# Patient Record
Sex: MALE | Race: WHITE | NOT HISPANIC OR LATINO | URBAN - METROPOLITAN AREA
[De-identification: names, ages, dates, MRNs, and addresses within clinical notes are randomized per-mention and may not be internally consistent; named-entity substitution may affect disease eponyms.]

---

## 2017-08-06 ENCOUNTER — MYC REFILL (OUTPATIENT)
Dept: FAMILY MEDICINE | Facility: CLINIC | Age: 62
End: 2017-08-06

## 2017-08-06 DIAGNOSIS — J45.40 MODERATE PERSISTENT ASTHMA WITHOUT COMPLICATION: ICD-10-CM

## 2017-08-07 NOTE — TELEPHONE ENCOUNTER
Routing refill request to provider for review/approval because:  Patient needs to be seen because it has been more than 1 year since last office visit.  ACT > 6 months old  Kaye Schaefer RN - Triage  [facility]

## 2017-08-07 NOTE — TELEPHONE ENCOUNTER
Flovent diskus       Last Written Prescription Date: 3/13/17  Last Fill Quantity: 1, # refills: 0    Last Office Visit with G, P or [facility] prescribing provider:  3/9/16   Future Office Visit:       Date of Last Asthma Action Plan Letter:   Asthma Action Plan Q1 Year    Topic Date Due     Asthma Action Plan - yearly  03/09/2017      Asthma Control Test:   ACT Total Scores 3/9/2016   ACT TOTAL SCORE -   ASTHMA ER VISITS -   ASTHMA HOSPITALIZATIONS -   ACT TOTAL SCORE (Goal Greater than or Equal to 20) 25   In the past 12 months, how many times did you visit the emergency room for your asthma without being admitted to the hospital? 0   In the past 12 months, how many times were you hospitalized overnight because of your asthma? 0       Date of Last Spirometry Test:   No results found for this or any previous visit.          Elsa Mcgee CMA

## 2017-08-07 NOTE — TELEPHONE ENCOUNTER
Message from [employer]:  Original authorizing provider: MD Rommel Valdez would like a refill of the following medications:  fluticasone (FLOVENT DISKUS) 250 MCG/BLIST AEPB Inhaler [Yuly Sethi MD]    Preferred pharmacy: [facility] [address]    Comment:

## 2017-08-08 ENCOUNTER — MYC REFILL (OUTPATIENT)
Dept: FAMILY MEDICINE | Facility: CLINIC | Age: 62
End: 2017-08-08

## 2017-08-08 DIAGNOSIS — J45.40 MODERATE PERSISTENT ASTHMA WITHOUT COMPLICATION: ICD-10-CM

## 2017-08-08 NOTE — TELEPHONE ENCOUNTER
left voicemail message for patient to contact Main Clinic Number to schedule.  NTBS: physical/med check  Angy GAMINO

## 2017-08-08 NOTE — TELEPHONE ENCOUNTER
Message from [employer]:  Original authorizing provider: MD Rommel Valdez would like a refill of the following medications:  fluticasone (FLOVENT DISKUS) 250 MCG/BLIST AEPB Inhaler [Yuly Sethi MD]    Preferred pharmacy: [facility] [address], [address]    Comment:

## 2018-01-11 ENCOUNTER — OFFICE VISIT (OUTPATIENT)
Dept: FAMILY MEDICINE | Facility: CLINIC | Age: 63
End: 2018-01-11
Payer: COMMERCIAL

## 2018-01-11 VITALS
SYSTOLIC BLOOD PRESSURE: 128 MMHG | DIASTOLIC BLOOD PRESSURE: 79 MMHG | TEMPERATURE: 97.3 F | WEIGHT: 264 LBS | BODY MASS INDEX: 39.1 KG/M2 | HEIGHT: 69 IN | HEART RATE: 92 BPM | OXYGEN SATURATION: 94 %

## 2018-01-11 DIAGNOSIS — Z23 NEED FOR PROPHYLACTIC VACCINATION WITH TETANUS-DIPHTHERIA (TD): ICD-10-CM

## 2018-01-11 DIAGNOSIS — E66.01 OBESITY, CLASS III, BMI 40-49.9 (MORBID OBESITY) (H): ICD-10-CM

## 2018-01-11 DIAGNOSIS — Z23 NEED FOR PROPHYLACTIC VACCINATION AND INOCULATION AGAINST INFLUENZA: ICD-10-CM

## 2018-01-11 DIAGNOSIS — G47.33 OSA (OBSTRUCTIVE SLEEP APNEA): Primary | ICD-10-CM

## 2018-01-11 DIAGNOSIS — R74.01 TRANSAMINASEMIA: ICD-10-CM

## 2018-01-11 DIAGNOSIS — R73.9 HYPERGLYCEMIA: ICD-10-CM

## 2018-01-11 DIAGNOSIS — J45.40 MODERATE PERSISTENT ASTHMA WITHOUT COMPLICATION: ICD-10-CM

## 2018-01-11 DIAGNOSIS — Z12.5 SCREENING FOR PROSTATE CANCER: ICD-10-CM

## 2018-01-11 DIAGNOSIS — Z11.59 NEED FOR HEPATITIS C SCREENING TEST: ICD-10-CM

## 2018-01-11 DIAGNOSIS — E78.5 HYPERLIPIDEMIA LDL GOAL <130: ICD-10-CM

## 2018-01-11 LAB
CREAT UR-MCNC: 236 MG/DL
ERYTHROCYTE [DISTWIDTH] IN BLOOD BY AUTOMATED COUNT: 13.1 % (ref 10–15)
FEF 25/75: NORMAL
FEV-1: NORMAL
FEV1/FVC: NORMAL
FVC: NORMAL
HBA1C MFR BLD: 10.6 % (ref 4.3–6)
HCT VFR BLD AUTO: 44.3 % (ref 40–53)
HCV AB SERPL QL IA: NONREACTIVE
HGB BLD-MCNC: 15.2 G/DL (ref 13.3–17.7)
MCH RBC QN AUTO: 28.8 PG (ref 26.5–33)
MCHC RBC AUTO-ENTMCNC: 34.3 G/DL (ref 31.5–36.5)
MCV RBC AUTO: 84 FL (ref 78–100)
MICROALBUMIN UR-MCNC: 50 MG/L
MICROALBUMIN/CREAT UR: 20.97 MG/G CR (ref 0–17)
PLATELET # BLD AUTO: 217 10E9/L (ref 150–450)
PSA SERPL-ACNC: 0.22 UG/L (ref 0–4)
RBC # BLD AUTO: 5.28 10E12/L (ref 4.4–5.9)
WBC # BLD AUTO: 7.5 10E9/L (ref 4–11)

## 2018-01-11 PROCEDURE — 80061 LIPID PANEL: CPT | Performed by: INTERNAL MEDICINE

## 2018-01-11 PROCEDURE — 83036 HEMOGLOBIN GLYCOSYLATED A1C: CPT | Performed by: INTERNAL MEDICINE

## 2018-01-11 PROCEDURE — 84443 ASSAY THYROID STIM HORMONE: CPT | Performed by: INTERNAL MEDICINE

## 2018-01-11 PROCEDURE — 86803 HEPATITIS C AB TEST: CPT | Performed by: INTERNAL MEDICINE

## 2018-01-11 PROCEDURE — G0103 PSA SCREENING: HCPCS | Performed by: INTERNAL MEDICINE

## 2018-01-11 PROCEDURE — 36415 COLL VENOUS BLD VENIPUNCTURE: CPT | Performed by: INTERNAL MEDICINE

## 2018-01-11 PROCEDURE — 90715 TDAP VACCINE 7 YRS/> IM: CPT | Performed by: INTERNAL MEDICINE

## 2018-01-11 PROCEDURE — 82043 UR ALBUMIN QUANTITATIVE: CPT | Performed by: INTERNAL MEDICINE

## 2018-01-11 PROCEDURE — 85027 COMPLETE CBC AUTOMATED: CPT | Performed by: INTERNAL MEDICINE

## 2018-01-11 PROCEDURE — 99386 PREV VISIT NEW AGE 40-64: CPT | Mod: 25 | Performed by: INTERNAL MEDICINE

## 2018-01-11 PROCEDURE — 90471 IMMUNIZATION ADMIN: CPT | Performed by: INTERNAL MEDICINE

## 2018-01-11 PROCEDURE — 94010 BREATHING CAPACITY TEST: CPT | Performed by: INTERNAL MEDICINE

## 2018-01-11 PROCEDURE — 80053 COMPREHEN METABOLIC PANEL: CPT | Performed by: INTERNAL MEDICINE

## 2018-01-11 NOTE — PATIENT INSTRUCTIONS
Preventive Health Recommendations  Male Ages 50   64    Yearly exam:             See your health care provider every year in order to  o   Review health changes.   o   Discuss preventive care.    o   Review your medicines if your doctor has prescribed any.     Have a cholesterol test every 5 years, or more frequently if you are at risk for high cholesterol/heart disease.     Have a diabetes test (fasting glucose) every three years. If you are at risk for diabetes, you should have this test more often.     Have a colonoscopy at age 50, or have a yearly FIT test (stool test). These exams will check for colon cancer.      Talk with your health care provider about whether or not a prostate cancer screening test (PSA) is right for you.    You should be tested each year for STDs (sexually transmitted diseases), if you re at risk.     Shots: Get a flu shot each year. Get a tetanus shot every 10 years.     Nutrition:    Eat at least 5 servings of fruits and vegetables daily.     Eat whole-grain bread, whole-wheat pasta and brown rice instead of white grains and rice.     Talk to your provider about Calcium and Vitamin D.     Lifestyle    Exercise for at least 150 minutes a week (30 minutes a day, 5 days a week). This will help you control your weight and prevent disease.     Limit alcohol to one drink per day.     No smoking.     Wear sunscreen to prevent skin cancer.     See your dentist every six months for an exam and cleaning.     See your eye doctor every 1 to 2 years.  Assessment/Plan Recommendations:  -Advised patient to use Lubriderm lotion over his whole body after bathing. Recommend he use AmLactin cream on his feet on a daily basis.  -Recommend patient return to follow up on his lab reports, and reevaluate his breathing, and consider possible referral to the dietician.

## 2018-01-11 NOTE — NURSING NOTE
Screening Questionnaire for Adult Immunization    Are you sick today?   No   Do you have allergies to medications, food, a vaccine component or latex?   No   Have you ever had a serious reaction after receiving a vaccination?   No   Do you have a long-term health problem with heart disease, lung disease, asthma, kidney disease, metabolic disease (e.g. diabetes), anemia, or other blood disorder?   No   Do you have cancer, leukemia, HIV/AIDS, or any other immune system problem?   No   In the past 3 months, have you taken medications that affect  your immune system, such as prednisone, other steroids, or anticancer drugs; drugs for the treatment of rheumatoid arthritis, Crohn s disease, or psoriasis; or have you had radiation treatments?   No   Have you had a seizure, or a brain or other nervous system problem?   No   During the past year, have you received a transfusion of blood or blood     products, or been given immune (gamma) globulin or antiviral drug?   No   For women: Are you pregnant or is there a chance you could become        pregnant during the next month?   No   Have you received any vaccinations in the past 4 weeks?   No     Immunization questionnaire answers were all negative.        Per orders of Dr. Tomlinson, injection of Tdap given by Sherley Mariano. Patient instructed to remain in clinic for 15 minutes afterwards, and to report any adverse reaction to me immediately.       Screening performed by Sherley Mariano on 1/11/2018 at 8:23 AM.

## 2018-01-11 NOTE — NURSING NOTE
"Chief Complaint   Patient presents with     Physical       Initial /79 (Cuff Size: Adult Large)  Pulse 92  Temp 97.3  F (36.3  C) (Oral)  Ht 5' 8.5\" (1.74 m)  Wt 264 lb (119.7 kg)  SpO2 94%  BMI 39.56 kg/m2 Estimated body mass index is 39.56 kg/(m^2) as calculated from the following:    Height as of this encounter: 5' 8.5\" (1.74 m).    Weight as of this encounter: 264 lb (119.7 kg).  Medication Reconciliation: complete    "

## 2018-01-11 NOTE — MR AVS SNAPSHOT
After Visit Summary   1/11/2018    Rommel Bryan    MRN: [id number]           Patient Information     Date Of Birth          1955        Visit Information        Provider Department      1/11/2018 7:30 AM Kieran Tomlinson MD [facility]        Today's Diagnoses     LYRIC (obstructive sleep apnea)    -  1    Hyperlipidemia LDL goal <130        Obesity, Class III, BMI 40-49.9 (morbid obesity) (H)        Transaminasemia        Moderate persistent asthma without complication        Hyperglycemia        Screening for prostate cancer        Need for hepatitis C screening test        Need for prophylactic vaccination and inoculation against influenza        Need for prophylactic vaccination with tetanus-diphtheria (TD)          Care Instructions      Preventive Health Recommendations  Male Ages 50 - 64    Yearly exam:             See your health care provider every year in order to  o   Review health changes.   o   Discuss preventive care.    o   Review your medicines if your doctor has prescribed any.     Have a cholesterol test every 5 years, or more frequently if you are at risk for high cholesterol/heart disease.     Have a diabetes test (fasting glucose) every three years. If you are at risk for diabetes, you should have this test more often.     Have a colonoscopy at age 50, or have a yearly FIT test (stool test). These exams will check for colon cancer.      Talk with your health care provider about whether or not a prostate cancer screening test (PSA) is right for you.    You should be tested each year for STDs (sexually transmitted diseases), if you re at risk.     Shots: Get a flu shot each year. Get a tetanus shot every 10 years.     Nutrition:    Eat at least 5 servings of fruits and vegetables daily.     Eat whole-grain bread, whole-wheat pasta and brown rice instead of white grains and rice.     Talk to your provider about Calcium and Vitamin D.     Lifestyle    Exercise  for at least 150 minutes a week (30 minutes a day, 5 days a week). This will help you control your weight and prevent disease.     Limit alcohol to one drink per day.     No smoking.     Wear sunscreen to prevent skin cancer.     See your dentist every six months for an exam and cleaning.     See your eye doctor every 1 to 2 years.  Assessment/Plan Recommendations:  -Advised patient to use Lubriderm lotion over his whole body after bathing. Recommend he use AmLactin cream on his feet on a daily basis.  -Recommend patient return to follow up on his lab reports, and reevaluate his breathing, and consider possible referral to the dietician.          Follow-ups after your visit        Who to contact     If you have questions or need follow up information about today's clinic visit or your schedule please contact [facility] directly at [phone].  Normal or non-critical lab and imaging results will be communicated to you by EnGeneIChart, letter or phone within 4 business days after the clinic has received the results. If you do not hear from us within 7 days, please contact the clinic through EnGeneIChart or phone. If you have a critical or abnormal lab result, we will notify you by phone as soon as possible.  Submit refill requests through [employer] or call your pharmacy and they will forward the refill request to us. Please allow 3 business days for your refill to be completed.          Additional Information About Your Visit        [employer] Information     [employer] gives you secure access to your electronic health record. If you see a primary care provider, you can also send messages to your care team and make appointments. If you have questions, please call your primary care clinic.  If you do not have a primary care provider, please call [phone] and they will assist you.        Care EveryWhere ID     This is your Care EveryWhere ID. This could be used by other organizations to access your [facility]  "records  [id number]        Your Vitals Were     Pulse Temperature Height Pulse Oximetry BMI (Body Mass Index)       92 97.3  F (36.3  C) (Oral) 1.74 m (5' 8.5\") 94% 39.56 kg/m2        Blood Pressure from Last 3 Encounters:   01/11/18 128/79   03/09/16 138/83   02/06/15 165/90    Weight from Last 3 Encounters:   01/11/18 119.7 kg (264 lb)   03/09/16 130.2 kg (287 lb)   02/06/15 127.6 kg (281 lb 6.4 oz)              We Performed the Following     Albumin Random Urine Quantitative with Creat Ratio     C FOOT EXAM  NO CHARGE     CBC with platelets     Comprehensive metabolic panel     Hemoglobin A1c     Hepatitis C Screen Reflex to HCV RNA Quant and Genotype     Lipid panel reflex to direct LDL Fasting     Prostate spec antigen screen     Spirometry, Breathing Capacity: Normal Order, Clinic Performed     TDAP VACCINE (ADACEL)     TSH with free T4 reflex        Primary Care Provider Office Phone # Fax #    Kieran Tomlinson [phone] [address] [address]        Equal Access to Services     [facility]: Hadii aad ku hadasho Soloraine, waaxda luqadaha, qaybta kaalmada nael, haley young . So [facility] [phone].    ATENCIÓN: Si habla español, tiene a conroy disposición servicios gratuitos de asistencia lingüística. Llame al [phone].    We comply with applicable federal civil rights laws and [place] laws. We do not discriminate on the basis of race, color, national origin, age, disability, sex, sexual orientation, or gender identity.            Thank you!     Thank you for choosing [facility]  for your care. Our goal is always to provide you with excellent care. Hearing back from our patients is one way we can continue to improve our services. Please take a few minutes to complete the written survey that you may receive in the mail after your visit with us. Thank you!             Your Updated Medication List - Protect others " around you: Learn how to safely use, store and throw away your medicines at www.disposemymeds.org.          This list is accurate as of: 1/11/18  8:32 AM.  Always use your most recent med list.                   Brand Name Dispense Instructions for use Diagnosis    albuterol 108 (90 BASE) MCG/ACT Inhaler    PROAIR HFA/PROVENTIL HFA/VENTOLIN HFA    1 Inhaler    Inhale 2 puffs into the lungs every 6 hours as needed for shortness of breath / dyspnea    Moderate persistent asthma without complication       aspirin 81 MG tablet      Take 1 tablet by mouth daily.        fluticasone 250 MCG/BLIST Aepb Inhaler    FLOVENT DISKUS    1 Inhaler    Inhale 1 puff into the lungs 2 times daily    Moderate persistent asthma without complication       fluticasone-salmeterol 250-50 MCG/DOSE diskus inhaler    ADVAIR    1 Inhaler    Inhale 1 puff into the lungs 2 times daily    Moderate persistent asthma without complication       montelukast 10 MG tablet    SINGULAIR    30 tablet    Take 1 tablet (10 mg) by mouth At Bedtime    Moderate persistent asthma with acute exacerbation       order for DME     1 each    Equipment being ordered: CPAP mask only    LYRIC (obstructive sleep apnea)       simvastatin 20 MG tablet    ZOCOR    90 tablet    Take 1 tablet (20 mg) by mouth At Bedtime    Hypercholesterolemia

## 2018-01-11 NOTE — PROGRESS NOTES
SUBJECTIVE:   CC: Rommel Bryan is an 62 year old male who presents for preventative health visit.     Healthy Habits:Answers for HPI/ROS submitted by the patient on 1/10/2018   Annual Exam:  Getting at least 3 servings of Calcium per day:: NO  Bi-annual eye exam:: NO  Dental care twice a year:: Yes  Sleep apnea or symptoms of sleep apnea:: Sleep apnea  Diet:: Regular (no restrictions)  Frequency of exercise:: None  Taking medications regularly:: Not Applicable  Medication side effects:: None  Additional concerns today:: No  PHQ-2 Score: 0    Today's PHQ-2 Score: PHQ-2 ( 1999 Pfizer) 1/10/2018 3/9/2016   Q1: Little interest or pleasure in doing things 0 0   Q2: Feeling down, depressed or hopeless 0 0   PHQ-2 Score 0 0   Q1: Little interest or pleasure in doing things Not at all -   Q2: Feeling down, depressed or hopeless Not at all -   PHQ-2 Score 0 -       Patient states that he is currently on 250 MCG, he is interested in going back down to 150 MCG due to availability of his prescription, he is not using ProAir, states that he was prescribed the medication because he wheezes at night, isn't taking Singulair and Zocor, he uses his CPAP at night for his apnea.    He reports that he has nocturia 1-4x. He reports that his nocturia has increased from a year ago where he only had symptoms once a night. Denies any perineal pressure. He reports that his sensitivity in his penis has decreased as well. He states that he can't feel when he urinates. He also has decreased sensitivity in his hands, feet, legs etc.    He is interested in starting a weight loss program. He has lost 13 pounds since his last visit, but he was unaware of this weight loss. He is interested in weight loss due to cardiac risks.    Patient reports occasional lumbar back pain. States that when he goes to bed he wakes up with pain. He sleeps on his side. He reports that warm showers and activities improves the pain.    He denies any headaches,  sinus issues, agnina, palpitations, heartburn, and skin changes.      Abuse: Current or Past(Physical, Sexual or Emotional)- No  Do you feel safe in your environment - Yes  Social History   Substance Use Topics     Smoking status: Never Smoker     Smokeless tobacco: Never Used     Alcohol use No      If you drink alcohol do you typically have >3 drinks per day or >7 drinks per week? No                      Last PSA:   Abbott PSA   Date Value Ref Range Status   05/18/2012 0.3 < OR = 4.0 ng/mL Final     Comment:        This test was performed using the Siemens  chemiluminescent method. Values obtained from  different assay methods cannot be used  interchangeably. PSA levels, regardless of  value, should not be interpreted as absolute  evidence of the presence or absence of disease.        PSA   Date Value Ref Range Status   03/10/2014 0.34 0 - 4 ug/L Final       Reviewed orders with patient. Reviewed health maintenance and updated orders accordingly - Yes  Current Outpatient Prescriptions   Medication Sig Dispense Refill     fluticasone (FLOVENT DISKUS) 250 MCG/BLIST AEPB Inhaler Inhale 1 puff into the lungs 2 times daily 1 Inhaler 0     order for DME Equipment being ordered: CPAP mask only 1 each 0     albuterol (PROAIR HFA, PROVENTIL HFA, VENTOLIN HFA) 108 (90 BASE) MCG/ACT inhaler Inhale 2 puffs into the lungs every 6 hours as needed for shortness of breath / dyspnea 1 Inhaler 1     aspirin 81 MG tablet Take 1 tablet by mouth daily.       montelukast (SINGULAIR) 10 MG tablet Take 1 tablet (10 mg) by mouth At Bedtime 30 tablet 0     fluticasone-salmeterol (ADVAIR) 250-50 MCG/DOSE diskus inhaler Inhale 1 puff into the lungs 2 times daily (Patient not taking: Reported on 1/11/2018) 1 Inhaler 0     simvastatin (ZOCOR) 20 MG tablet Take 1 tablet (20 mg) by mouth At Bedtime (Patient not taking: Reported on 1/11/2018) 90 tablet 3     No Known Allergies    Reviewed and updated as needed this visit by clinical  "staff  Tobacco  Allergies  Meds       Reviewed and updated as needed this visit by Provider        ROS:  C: NEGATIVE for fever, chills, change in weight  I: NEGATIVE for worrisome rashes, moles or lesions  E: NEGATIVE for vision changes or irritation  ENT: NEGATIVE for ear, mouth and throat problems  R: NEGATIVE for significant cough or SOB  CV: NEGATIVE for chest pain, palpitations or peripheral edema  GI: NEGATIVE for nausea, abdominal pain, heartburn, or change in bowel habits   male: negative for dysuria, hematuria, decreased urinary stream, erectile dysfunction, urethral discharge  M: NEGATIVE for significant arthralgias or myalgia  N: NEGATIVE for weakness, dizziness or paresthesias  P: NEGATIVE for changes in mood or affect   POSITIVE for seasonal allergies, vision changes, lumbar back pain, occasional constipation, and occasional hematochezia.    This document serves as a record of the services and decisions personally performed and made by Kieran Tomlinson MD. It was created on his/her behalf by Sinai Baird, a trained medical scribe. The creation of this document is based the provider's statements to the medical scribe.  Scribjama Baird 7:44 AM, January 11, 2018    OBJECTIVE:   Pulse 92  Ht 5' 8.5\" (1.74 m)  Wt 264 lb (119.7 kg)  SpO2 94%  BMI 39.56 kg/m2  EXAM:  GENERAL: healthy, alert and no distress  EYES: Eyes grossly normal to inspection, PERRL and conjunctivae and sclerae normal  HENT: ear canals and TM's normal, nose and mouth without ulcers or lesions  NECK: no adenopathy, no asymmetry, masses, or scars and thyroid normal to palpation, mildly kyphotic, reduced ROM to the left 45 degrees  RESP: lungs clear to auscultation - no rales, rhonchi or wheezes  CV: regular rate and rhythm, normal S1 S2, no S3 or S4, no murmur, click or rub, no peripheral edema and peripheral pulses strong  ABDOMEN: soft, nontender, no hepatosplenomegaly, no masses and bowel sounds normal   (male): " normal male genitalia without lesions or urethral discharge, no hernia, testicles atrophic bilaterally  RECTAL: normal sphincter tone, no rectal masses, prostate enlarged and incompletely examined, smooth, nontender without nodules or masses  MS: no gross musculoskeletal defects noted, no edema, he has moderate varicose veins of both lower extremities  SKIN: no suspicious lesions or rashes, he has multiple seborrheic keratoses, and multiple excoriated dry scaly skin, one area in particular in lower thoracic area, left upper medial thigh he has a cystic benign lesion  Feet: very dry and scaly, filament exam normal  NEURO: Normal strength and tone, mentation intact and speech normal  PSYCH: mentation appears normal, affect normal/bright    ASSESSMENT/PLAN:   1. LYRIC (obstructive sleep apnea)  -Patient will return to follow up on his breathing.    2. Hyperlipidemia LDL goal <130  -Will be reevaluated after labs are resulted.  - Lipid panel reflex to direct LDL Fasting    3. Obesity, Class III, BMI 40-49.9 (morbid obesity) (H)  -Patient is interested in weight loss. We will discuss his options for weight loss and potential referral to dietician when he returns for follow up.    4. Transaminasemia  - Comprehensive metabolic panel    5. Moderate persistent asthma without complication  - Spirometry, Breathing Capacity: Normal Order, Clinic Performed  - CBC with platelets  - Comprehensive metabolic panel  - Hepatitis C Screen Reflex to HCV RNA Quant and Genotype    6. Hyperglycemia  - TSH with free T4 reflex  - Hemoglobin A1c  - Albumin Random Urine Quantitative with Creat Ratio  - C FOOT EXAM  NO CHARGE    7. Screening for prostate cancer  - Prostate spec antigen screen    8. Need for hepatitis C screening test  -Labs pending    9. Need for prophylactic vaccination and inoculation against influenza  -Given today.    10. Need for prophylactic vaccination with tetanus-diphtheria (TD)  - TDAP VACCINE (ADACEL)    -Advised  "patient to use Lubriderm lotion over his whole body after bathing. Recommend he use AmLactin cream on his feet on a daily basis.  -Recommend patient return at his convenience to follow up on his lab reports, and reevaluate his breathing, and consider possible referral to the dietician.    COUNSELING:  Reviewed preventive health counseling, as reflected in patient instructions       Regular exercise       Healthy diet/nutrition       Vision screening       Hearing screening       Prostate cancer screening     reports that he has never smoked. He has never used smokeless tobacco.    Estimated body mass index is 39.56 kg/(m^2) as calculated from the following:    Height as of this encounter: 5' 8.5\" (1.74 m).    Weight as of this encounter: 264 lb (119.7 kg).   Weight management plan: Discussed healthy diet and exercise guidelines and patient will follow up in 3 months in clinic to re-evaluate.    Counseling Resources:  ATP IV Guidelines  Pooled Cohorts Equation Calculator  FRAX Risk Assessment  ICSI Preventive Guidelines  Dietary Guidelines for Americans, 2010  USDA's MyPlate  ASA Prophylaxis  Lung CA Screening    Kieran Tomlinson MD  [facility]    The information in this document, created by the medical scribe for me, accurately reflects the services I personally performed and the decisions made by me. I have reviewed and approved this document for accuracy prior to leaving the patient care area.  Kieran Tomlinson MD  8:26 AM, 01/11/18      "

## 2018-01-12 LAB
ALBUMIN SERPL-MCNC: 3.9 G/DL (ref 3.4–5)
ALP SERPL-CCNC: 73 U/L (ref 40–150)
ALT SERPL W P-5'-P-CCNC: 114 U/L (ref 0–70)
ANION GAP SERPL CALCULATED.3IONS-SCNC: 12 MMOL/L (ref 3–14)
AST SERPL W P-5'-P-CCNC: 97 U/L (ref 0–45)
BILIRUB SERPL-MCNC: 0.9 MG/DL (ref 0.2–1.3)
BUN SERPL-MCNC: 16 MG/DL (ref 7–30)
CALCIUM SERPL-MCNC: 9.1 MG/DL (ref 8.5–10.1)
CHLORIDE SERPL-SCNC: 101 MMOL/L (ref 94–109)
CHOLEST SERPL-MCNC: 283 MG/DL
CO2 SERPL-SCNC: 22 MMOL/L (ref 20–32)
CREAT SERPL-MCNC: 0.55 MG/DL (ref 0.66–1.25)
GFR SERPL CREATININE-BSD FRML MDRD: >90 ML/MIN/1.7M2
GLUCOSE SERPL-MCNC: 209 MG/DL (ref 70–99)
HDLC SERPL-MCNC: 45 MG/DL
LDLC SERPL CALC-MCNC: 211 MG/DL
NONHDLC SERPL-MCNC: 238 MG/DL
POTASSIUM SERPL-SCNC: 4 MMOL/L (ref 3.4–5.3)
PROT SERPL-MCNC: 7.5 G/DL (ref 6.8–8.8)
SODIUM SERPL-SCNC: 135 MMOL/L (ref 133–144)
TRIGL SERPL-MCNC: 136 MG/DL
TSH SERPL DL<=0.005 MIU/L-ACNC: 2.04 MU/L (ref 0.4–4)

## 2018-01-12 ASSESSMENT — ASTHMA QUESTIONNAIRES: ACT_TOTALSCORE: 25

## 2018-01-23 ENCOUNTER — OFFICE VISIT (OUTPATIENT)
Dept: FAMILY MEDICINE | Facility: CLINIC | Age: 63
End: 2018-01-23
Payer: COMMERCIAL

## 2018-01-23 VITALS
BODY MASS INDEX: 39.1 KG/M2 | SYSTOLIC BLOOD PRESSURE: 150 MMHG | HEIGHT: 69 IN | TEMPERATURE: 98.1 F | OXYGEN SATURATION: 97 % | DIASTOLIC BLOOD PRESSURE: 82 MMHG | WEIGHT: 264 LBS | HEART RATE: 84 BPM

## 2018-01-23 DIAGNOSIS — R63.4 WEIGHT LOSS: Primary | ICD-10-CM

## 2018-01-23 DIAGNOSIS — E78.5 HYPERLIPIDEMIA LDL GOAL <130: ICD-10-CM

## 2018-01-23 DIAGNOSIS — J45.40 MODERATE PERSISTENT ASTHMA WITHOUT COMPLICATION: ICD-10-CM

## 2018-01-23 DIAGNOSIS — R73.9 HYPERGLYCEMIA: ICD-10-CM

## 2018-01-23 DIAGNOSIS — R74.01 TRANSAMINASEMIA: ICD-10-CM

## 2018-01-23 DIAGNOSIS — G47.33 OSA (OBSTRUCTIVE SLEEP APNEA): ICD-10-CM

## 2018-01-23 DIAGNOSIS — E11.9 TYPE 2 DIABETES MELLITUS WITHOUT COMPLICATION, WITHOUT LONG-TERM CURRENT USE OF INSULIN (H): ICD-10-CM

## 2018-01-23 PROCEDURE — 99214 OFFICE O/P EST MOD 30 MIN: CPT | Performed by: INTERNAL MEDICINE

## 2018-01-23 RX ORDER — ATORVASTATIN CALCIUM 40 MG/1
40 TABLET, FILM COATED ORAL DAILY
Qty: 30 TABLET | Refills: 1 | Status: SHIPPED | OUTPATIENT
Start: 2018-01-23 | End: 2018-03-21

## 2018-01-23 RX ORDER — GLIPIZIDE 5 MG/1
5 TABLET ORAL
Qty: 60 TABLET | Refills: 1 | Status: SHIPPED | OUTPATIENT
Start: 2018-01-23 | End: 2018-03-21

## 2018-01-23 NOTE — PROGRESS NOTES
SUBJECTIVE:   Rommel Bryan is a 63 year old male who presents to clinic today for the following health issues:      The patient presents to the clinic for review of lab results completed on 1/11/2018. He had an elevated albumin urine of 20.97, an elevated A1C of 10.6, an elevated total cholesterol of 283, an elevated LDL of 211, an elevated non HDL cholesterol of 238, a blood glucose of 209, creatinine 0.55, , and AST 97. He has lost twenty pounds over the past two years. The patient has maternal history of type II diabetes.    The patient was previously taking 20 mg of simvastatin for hyperlipidemia but he has not taken it in the last year.    The patient has been taking Flovent for control of asthma.       Problem list and histories reviewed & adjusted, as indicated.  Additional history: as documented      Current Outpatient Prescriptions   Medication Sig Dispense Refill     fluticasone (FLOVENT DISKUS) 250 MCG/BLIST AEPB Inhaler Inhale 1 puff into the lungs 2 times daily 1 Inhaler 0     order for DME Equipment being ordered: CPAP mask only 1 each 0     albuterol (PROAIR HFA, PROVENTIL HFA, VENTOLIN HFA) 108 (90 BASE) MCG/ACT inhaler Inhale 2 puffs into the lungs every 6 hours as needed for shortness of breath / dyspnea 1 Inhaler 1     montelukast (SINGULAIR) 10 MG tablet Take 1 tablet (10 mg) by mouth At Bedtime 30 tablet 0     fluticasone-salmeterol (ADVAIR) 250-50 MCG/DOSE diskus inhaler Inhale 1 puff into the lungs 2 times daily 1 Inhaler 0     simvastatin (ZOCOR) 20 MG tablet Take 1 tablet (20 mg) by mouth At Bedtime 90 tablet 3     aspirin 81 MG tablet Take 1 tablet by mouth daily.       Recent Labs   Lab Test  01/11/18   0829  03/10/14   0801  01/04/13   0840  05/18/12   1204   A1C  10.6*   --    --    --    LDL  211*  120   --   160*   HDL  45  40*   --   45   TRIG  136  148   --   144   ALT  114*  50  87*  39   CR  0.55*  0.58*  0.74  0.82   GFRESTIMATED  >90  >90  >90  98   GFRESTBLACK  " >90  >90  >90   --    POTASSIUM  4.0  4.2  5.1  4.8   TSH  2.04   --    --    --           Reviewed and updated as needed this visit by clinical staffTobitao  Meds       Reviewed and updated as needed this visit by Provider         ROS:  Constitutional, HEENT, cardiovascular, pulmonary, GI, , musculoskeletal, neuro, skin, endocrine and psych systems are negative, except as otherwise noted.    This document serves as a record of the services and decisions personally performed and made by Kieran Tomlinson MD. It was created on his behalf by Anabella Logan, a trained medical scribe. The creation of this document is based the provider's statements to the medical scribe. 1/23/2018  OBJECTIVE:   /82  Pulse 84  Temp 98.1  F (36.7  C) (Oral)  Ht 1.74 m (5' 8.5\")  Wt 119.7 kg (264 lb)  SpO2 97%  BMI 39.56 kg/m2  Body mass index is 39.56 kg/(m^2).  25 minutes spent with patient, over 50% time counseling, coordinating care and explaining about nature of the patient's conditions.    Diagnostic Test Results:  none   ASSESSMENT/PLAN:   1. Weight loss    2. LYRIC (obstructive sleep apnea)    3. Hyperlipidemia LDL goal <130  - Start taking 40 mg atorvastatin once daily. Discontinue simvastatin prescription.  - atorvastatin (LIPITOR) 40 MG tablet; Take 1 tablet (40 mg) by mouth daily  Dispense: 30 tablet; Refill: 1    4. Transaminasemia    5. Moderate persistent asthma without complication  - Continue to use Flovent inhaler.    6. Hyperglycemia    7. Type 2 diabetes mellitus without complication, without long-term current use of insulin (H)  - Start taking metformin 500 mg once daily. We will begin to slowly increase by 500 mg until we reach 1000 mg twice daily. Potential side effects discussed.   - Start taking glipizide 5 mg once daily.     - Start checking blood sugars with meter twice daily - once before breakfast and once before dinner.   - Begin regular exercise in order to reduce the amount of needed " medications  - Meet with dietician to discuss diabetic diet. Start low fat and low carbohydrate diet (specifically with sweets such as cookies, soda, juice)  - Meet with diabetic educator   - metFORMIN (GLUCOPHAGE) 500 MG tablet; Take 1 tablet (500 mg) by mouth daily (with dinner)  Dispense: 60 tablet; Refill: 3  - glipiZIDE (GLUCOTROL) 5 MG tablet; Take 1 tablet (5 mg) by mouth every morning (before breakfast)  Dispense: 60 tablet; Refill: 1  - blood glucose monitoring (NO BRAND SPECIFIED) test strip; Use to test blood sugars bid times daily or as directed  Dispense: 200 strip; Refill: 3  - blood glucose monitoring (NO BRAND SPECIFIED) meter device kit; Use to test blood sugar bidtimes daily or as directed.  Dispense: 1 kit; Refill: 3      Followup in one week with phone call to review medications and one month in clinic for medication recheck and fasting labs.    Kieran Tomlinson MD  [facility]    The information in this document, created by the medical scribe for me, accurately reflects the services I personally performed and the decisions made by me. I have reviewed and approved this document for accuracy prior to leaving the patient care area.  Kieran Tomlinson MD  7:39 AM, 01/23/18

## 2018-01-23 NOTE — PATIENT INSTRUCTIONS
Diabetes   - Start taking metformin 500 mg once daily. We will begin to slowly increase the dose once your body adjusts to the medication. Weekly we will increase by 500 mg until we reach 1000 mg twice daily. Potential side effects include diarrhea.   - Start taking glipizide 5 mg once daily.     - Start checking blood sugars with meter twice daily - once before breakfast and once before dinner. Record blood sugar levels and bring these values to your next appointment.  - Begin regular exercise in order to reduce the amount of needed medications  - Meet with dietician to discuss diabetic diet. Start low fat and low carbohydrate diet (specifically with sweets such as cookies, soda, juice)  - Meet with diabetic educator     High Cholesterol (Hyperlipidemia)  - Start taking 40 mg atorvastatin once daily. Discontinue simvastatin prescription.    Asthma  - Continue to use Flovent inhaler. If insurance no longer covers the medication, contact me and we can change the medication to a comparable one that is covered by your insurance.       **Followup in one month for medication recheck and fasting lab recheck.

## 2018-01-23 NOTE — NURSING NOTE
"No chief complaint on file.      Initial /82  Pulse 84  Temp 98.1  F (36.7  C) (Oral)  Ht 5' 8.5\" (1.74 m)  Wt 264 lb (119.7 kg)  SpO2 97%  BMI 39.56 kg/m2 Estimated body mass index is 39.56 kg/(m^2) as calculated from the following:    Height as of this encounter: 5' 8.5\" (1.74 m).    Weight as of this encounter: 264 lb (119.7 kg).  Medication Reconciliation: complete   Carolyn Victor, JUAN      "

## 2018-01-23 NOTE — MR AVS SNAPSHOT
After Visit Summary   1/23/2018    Rommel Bryan    MRN: [id number]           Patient Information     Date Of Birth          1955        Visit Information        Provider Department      1/23/2018 7:30 AM Kieran Tomlinson MD [facility]        Today's Diagnoses     Weight loss    -  1    LYRIC (obstructive sleep apnea)        Hyperlipidemia LDL goal <130        Transaminasemia        Moderate persistent asthma without complication        Hyperglycemia        Type 2 diabetes mellitus without complication, without long-term current use of insulin (H)          Care Instructions    Diabetes   - Start taking metformin 500 mg once daily. We will begin to slowly increase the dose once your body adjusts to the medication. Weekly we will increase by 500 mg until we reach 1000 mg twice daily. Potential side effects include diarrhea.   - Start taking glipizide 5 mg once daily.     - Start checking blood sugars with meter twice daily - once before breakfast and once before dinner. Record blood sugar levels and bring these values to your next appointment.  - Begin regular exercise in order to reduce the amount of needed medications  - Meet with dietician to discuss diabetic diet. Start low fat and low carbohydrate diet (specifically with sweets such as cookies, soda, juice)  - Meet with diabetic educator     High Cholesterol (Hyperlipidemia)  - Start taking 40 mg atorvastatin once daily. Discontinue simvastatin prescription.    Asthma  - Continue to use Flovent inhaler. If insurance no longer covers the medication, contact me and we can change the medication to a comparable one that is covered by your insurance.       **Followup in one month for medication recheck and fasting lab recheck.               Follow-ups after your visit        Who to contact     If you have questions or need follow up information about today's clinic visit or your schedule please contact [facility]  "directly at [phone].  Normal or non-critical lab and imaging results will be communicated to you by Chobanihart, letter or phone within 4 business days after the clinic has received the results. If you do not hear from us within 7 days, please contact the clinic through Chobanihart or phone. If you have a critical or abnormal lab result, we will notify you by phone as soon as possible.  Submit refill requests through MENA PRESTIGE or call your pharmacy and they will forward the refill request to us. Please allow 3 business days for your refill to be completed.          Additional Information About Your Visit        Chobanihart Information     MENA PRESTIGE gives you secure access to your electronic health record. If you see a primary care provider, you can also send messages to your care team and make appointments. If you have questions, please call your primary care clinic.  If you do not have a primary care provider, please call [phone] and they will assist you.        Care EveryWhere ID     This is your Care EveryWhere ID. This could be used by other organizations to access your [facility] medical records  [id number]        Your Vitals Were     Pulse Temperature Height Pulse Oximetry BMI (Body Mass Index)       84 98.1  F (36.7  C) (Oral) 5' 8.5\" (1.74 m) 97% 39.56 kg/m2        Blood Pressure from Last 3 Encounters:   01/23/18 150/82   01/11/18 128/79   03/09/16 138/83    Weight from Last 3 Encounters:   01/23/18 264 lb (119.7 kg)   01/11/18 264 lb (119.7 kg)   03/09/16 287 lb (130.2 kg)              Today, you had the following     No orders found for display         Today's Medication Changes          These changes are accurate as of: 1/23/18  8:24 AM.  If you have any questions, ask your nurse or doctor.               Start taking these medicines.        Dose/Directions    atorvastatin 40 MG tablet   Commonly known as:  LIPITOR   Used for:  Hyperlipidemia LDL goal <130   Started by:  Kieran Tomlinson MD        Dose:  40 mg "   Take 1 tablet (40 mg) by mouth daily   Quantity:  30 tablet   Refills:  1       blood glucose monitoring meter device kit   Commonly known as:  no brand specified   Used for:  Type 2 diabetes mellitus without complication, without long-term current use of insulin (H)   Started by:  Kieran Tomlinson MD        Use to test blood sugar bidtimes daily or as directed.   Quantity:  1 kit   Refills:  3       blood glucose monitoring test strip   Commonly known as:  no brand specified   Used for:  Type 2 diabetes mellitus without complication, without long-term current use of insulin (H)   Started by:  Kieran Tomlinson MD        Use to test blood sugars bid times daily or as directed   Quantity:  200 strip   Refills:  3       glipiZIDE 5 MG tablet   Commonly known as:  GLUCOTROL   Used for:  Type 2 diabetes mellitus without complication, without long-term current use of insulin (H)   Started by:  Kieran Tomlinson MD        Dose:  5 mg   Take 1 tablet (5 mg) by mouth every morning (before breakfast)   Quantity:  60 tablet   Refills:  1       metFORMIN 500 MG tablet   Commonly known as:  GLUCOPHAGE   Used for:  Type 2 diabetes mellitus without complication, without long-term current use of insulin (H)   Started by:  Kieran Tolminson MD        Dose:  500 mg   Take 1 tablet (500 mg) by mouth daily (with dinner)   Quantity:  60 tablet   Refills:  3            Where to get your medicines      These medications were sent to [facility] [place], [place] - [address], [address]  [address], [address], [facility] [address]     Phone:  [phone]     atorvastatin 40 MG tablet    blood glucose monitoring meter device kit    blood glucose monitoring test strip    glipiZIDE 5 MG tablet    metFORMIN 500 MG tablet                Primary Care Provider Office Phone # Fax #    Kieran Tomlinson [phone] [address]  [facility] [address]        Equal Access to Services      ANA MCCARTHY : Hadii aad ku luis Farrell, waaxda luqadaha, qaybta kaalmada adesulaiman, haley christina junieclaudy doyle tophernatalie young . So [facility] [phone].    ATENCIÓN: Si habla español, tiene a conroy disposición servicios gratuitos de asistencia lingüística. Llame al [phone].    We comply with applicable federal civil rights laws and [place] laws. We do not discriminate on the basis of race, color, national origin, age, disability, sex, sexual orientation, or gender identity.            Thank you!     Thank you for choosing [facility]  for your care. Our goal is always to provide you with excellent care. Hearing back from our patients is one way we can continue to improve our services. Please take a few minutes to complete the written survey that you may receive in the mail after your visit with us. Thank you!             Your Updated Medication List - Protect others around you: Learn how to safely use, store and throw away your medicines at www.disposemymeds.org.          This list is accurate as of: 1/23/18  8:24 AM.  Always use your most recent med list.                   Brand Name Dispense Instructions for use Diagnosis    albuterol 108 (90 BASE) MCG/ACT Inhaler    PROAIR HFA/PROVENTIL HFA/VENTOLIN HFA    1 Inhaler    Inhale 2 puffs into the lungs every 6 hours as needed for shortness of breath / dyspnea    Moderate persistent asthma without complication       aspirin 81 MG tablet      Take 1 tablet by mouth daily.        atorvastatin 40 MG tablet    LIPITOR    30 tablet    Take 1 tablet (40 mg) by mouth daily    Hyperlipidemia LDL goal <130       blood glucose monitoring meter device kit    no brand specified    1 kit    Use to test blood sugar bidtimes daily or as directed.    Type 2 diabetes mellitus without complication, without long-term current use of insulin (H)       blood glucose monitoring test strip    no brand specified    200 strip    Use to test blood sugars bid times daily or as  directed    Type 2 diabetes mellitus without complication, without long-term current use of insulin (H)       fluticasone 250 MCG/BLIST Aepb Inhaler    FLOVENT DISKUS    1 Inhaler    Inhale 1 puff into the lungs 2 times daily    Moderate persistent asthma without complication       fluticasone-salmeterol 250-50 MCG/DOSE diskus inhaler    ADVAIR    1 Inhaler    Inhale 1 puff into the lungs 2 times daily    Moderate persistent asthma without complication       glipiZIDE 5 MG tablet    GLUCOTROL    60 tablet    Take 1 tablet (5 mg) by mouth every morning (before breakfast)    Type 2 diabetes mellitus without complication, without long-term current use of insulin (H)       metFORMIN 500 MG tablet    GLUCOPHAGE    60 tablet    Take 1 tablet (500 mg) by mouth daily (with dinner)    Type 2 diabetes mellitus without complication, without long-term current use of insulin (H)       montelukast 10 MG tablet    SINGULAIR    30 tablet    Take 1 tablet (10 mg) by mouth At Bedtime    Moderate persistent asthma with acute exacerbation       order for DME     1 each    Equipment being ordered: CPAP mask only    LYRIC (obstructive sleep apnea)       simvastatin 20 MG tablet    ZOCOR    90 tablet    Take 1 tablet (20 mg) by mouth At Bedtime    Hypercholesterolemia

## 2018-01-24 PROBLEM — E11.9 TYPE 2 DIABETES MELLITUS WITHOUT COMPLICATION, WITHOUT LONG-TERM CURRENT USE OF INSULIN (H): Status: ACTIVE | Noted: 2018-01-24

## 2018-02-03 ENCOUNTER — MYC MEDICAL ADVICE (OUTPATIENT)
Dept: FAMILY MEDICINE | Facility: CLINIC | Age: 63
End: 2018-02-03

## 2018-02-05 NOTE — TELEPHONE ENCOUNTER
To PCP:     Please see patient's message below with Blood Glucose readings.    Thank you,     Misa ELKINS RN

## 2018-02-06 NOTE — TELEPHONE ENCOUNTER
Reviewed BS records wh/ are good. Cont same unless having hypoglycemia or BS consistently <85.RTC 2 mo.

## 2018-02-19 ENCOUNTER — MYC REFILL (OUTPATIENT)
Dept: FAMILY MEDICINE | Facility: CLINIC | Age: 63
End: 2018-02-19

## 2018-02-19 DIAGNOSIS — E11.9 TYPE 2 DIABETES MELLITUS WITHOUT COMPLICATION, WITHOUT LONG-TERM CURRENT USE OF INSULIN (H): ICD-10-CM

## 2018-02-19 DIAGNOSIS — E78.5 HYPERLIPIDEMIA LDL GOAL <130: ICD-10-CM

## 2018-02-19 DIAGNOSIS — J45.40 MODERATE PERSISTENT ASTHMA WITHOUT COMPLICATION: ICD-10-CM

## 2018-02-19 RX ORDER — ATORVASTATIN CALCIUM 40 MG/1
40 TABLET, FILM COATED ORAL DAILY
Qty: 30 TABLET | Refills: 1 | Status: CANCELLED | OUTPATIENT
Start: 2018-02-19

## 2018-02-19 RX ORDER — GLIPIZIDE 5 MG/1
5 TABLET ORAL
Qty: 60 TABLET | Refills: 1 | Status: CANCELLED | OUTPATIENT
Start: 2018-02-19

## 2018-02-20 NOTE — TELEPHONE ENCOUNTER
Message from MyChart:  Original authorizing provider: MD Rommel Castillo would like a refill of the following medications:  metFORMIN (GLUCOPHAGE) 500 MG tablet [Kieran Tomlinson MD]  glipiZIDE (GLUCOTROL) 5 MG tablet [Kieran Tomlinson MD]  atorvastatin (LIPITOR) 40 MG tablet [Kieran Tomlinson MD]  fluticasone (FLOVENT DISKUS) 100 MCG/BLIST AEPB [Kieran Tomlinson MD]    Preferred pharmacy: [facility], [place] - [address], [address]    Comment:

## 2018-02-21 ENCOUNTER — OFFICE VISIT (OUTPATIENT)
Dept: NUTRITION | Facility: CLINIC | Age: 63
End: 2018-02-21
Payer: COMMERCIAL

## 2018-02-21 DIAGNOSIS — E11.9 TYPE 2 DIABETES MELLITUS WITHOUT COMPLICATION, WITHOUT LONG-TERM CURRENT USE OF INSULIN (H): Primary | ICD-10-CM

## 2018-02-21 PROCEDURE — G0108 DIAB MANAGE TRN  PER INDIV: HCPCS

## 2018-02-21 NOTE — PROGRESS NOTES
Diabetes Self Management Training: Initial Assessment Visit for Newly Diagnosed Patients (Complete AADE Goals Flowsheet)    Rommel Bryan presents today for education related to Type 2 diabetes.    He is accompanied by spouse    Patient's diabetes management related comments/concerns: heart health information and combining that with diabetic diet    Patient's emotional response to diabetes: expresses readiness to learn    Patient would like this visit to be focused around the following diabetes-related behaviors and goals: Healthy Eating    ASSESSMENT:  Patient Problem List and Family Medical History reviewed for relevant medical history, current medical status, and diabetes risk factors.    Current Diabetes Management per Patient:  Taking diabetes medications?   yes:     Diabetes Medication(s)     Biguanides Sig    metFORMIN (GLUCOPHAGE) 500 MG tablet Take 1 tablet (500 mg) by mouth daily (with dinner)    Sulfonylureas Sig    glipiZIDE (GLUCOTROL) 5 MG tablet Take 1 tablet (5 mg) by mouth every morning (before breakfast)      , Problems taking diabetes medications regularly? No       Past Diabetes Education: Newly diagnosed    Patient glucose self monitoring as follows: two times daily.   BG meter: Accu-Chek Guide meter  BG results:   Date Breakfast  Lunch  Dinner  Bedtime    Before After Before After Before After    2/21 108         2/20 124    107     2/19 129    91     2/18 143    86     2/17 92    108     2/16 137    119     2/15 131    90          BG values are: In goal    Patient's most recent   Lab Results   Component Value Date    A1C 10.6 01/11/2018    is not meeting goal of <7.0    Nutrition:  Patient eats 3 meals per day and has decreased portions, eliminated soda from diet, increased vegetable intake, cutting back on bread    Breakfast - oatmeal with berries   Lunch - in work cafeteria -- small salad, veggie wrap or fruit & cheese  Dinner - fish with steamed broccoli, cottage cheese, fresh  "fruit  Snacks - mixed nuts, cheese at AM snack; cottage cheese or cheese at HS snack    Cultural/Episcopalian diet restrictions: No     Biggest Challenge to Healthy Eating: knowing what to eat    Physical Activity:    Type: walking & riding stationary bike  Duration: 30 minutes  Frequency: 3 days/week  Limitations: recent illness     Diabetes Risk Factors:  age over 45 years, hyperlipidemia, overweight/obesity and inactivity    Diabetes Complications:  Not discussed today.    Vitals:  There were no vitals taken for this visit.  Estimated body mass index is 39.56 kg/(m^2) as calculated from the following:    Height as of 1/23/18: 1.74 m (5' 8.5\").    Weight as of 1/23/18: 119.7 kg (264 lb).   Last 3 BP:   BP Readings from Last 3 Encounters:   01/23/18 150/82   01/11/18 128/79   03/09/16 138/83       History   Smoking Status     Never Smoker   Smokeless Tobacco     Never Used       Labs:  Lab Results   Component Value Date    A1C 10.6 01/11/2018     Lab Results   Component Value Date     01/11/2018     Lab Results   Component Value Date     01/11/2018     HDL Cholesterol   Date Value Ref Range Status   01/11/2018 45 >39 mg/dL Final   ]  GFR Estimate   Date Value Ref Range Status   01/11/2018 >90 >60 mL/min/1.7m2 Final     Comment:     Non  GFR Calc     GFR Estimate If Black   Date Value Ref Range Status   01/11/2018 >90 >60 mL/min/1.7m2 Final     Comment:      GFR Calc     Lab Results   Component Value Date    CR 0.55 01/11/2018     No results found for: MICROALBUMIN    Socio/Economic Considerations:    Support system: spouse/significant other    Health Beliefs and Attitudes:   Patient Activation Measure Survey Score:  JAMMIE Score (Last Two) 2/25/2014   JAMMIE Raw Score 36   Activation Score 47.4   JAMMIE Level 2       Stage of Change: ACTION (Actively working towards change)      Diabetes knowledge and skills assessment:     Patient is knowledgeable in diabetes management concepts " related to: Healthy Eating, Monitoring and Taking Medication  Patient needs further education on the following diabetes management concepts: Healthy Eating, Being Active, Monitoring, Taking Medication, Problem Solving, Reducing Risks and Healthy Coping  Barriers to Learning Assessment: No Barriers identified  Based on learning assessment above, most appropriate setting for further diabetes education would be: Group class or Individual setting.    INTERVENTION:   Education provided today on:  AADE Self-Care Behaviors:  Healthy Eating: carbohydrate counting, consistency in amount, composition, and timing of food intake, weight reduction, heart healthy diet, portion control and label reading. Rommel has made many positive changes to his diet already. Focused on keeping those up along with his weight loss. Encouraged him to still have some carbs with meals/snacks but just to be consistent and to have a moderate amount (45-60 grams for him to help continue his weight loss) vs no or very little carbs. Discussed how to balance diabetic diet guidelines with heart health as he is concerned about his cholesterol too.   Being Active: relationship to blood glucose. He has been exercising 3 days per week usually so encouraged at least this.   Monitoring: log and interpret results and individual blood glucose targets  Problem Solving: high blood glucose - causes, signs/symptoms, treatment and prevention    Opportunities for ongoing education and support in diabetes-self management were discussed.    Pt verbalized understanding of concepts discussed and recommendations provided today.       Education Materials Provided:  [facility] Understanding Diabetes Booklet,  Heart health guidelines and Seasoning your food without salt    PLAN:  See Patient Instructions for co-developed, patient-stated behavior change goals.  Meal Plan Recommendation: eat 3 meals a day, have small snacks between meals, if needed, use portion control and Aim for  3-4 carb servings at meals and 0-1 carb servings at snacks  Exercise / activity plan: continue at least 3 days per week.  Check blood sugars BID  Keep a blood glucose record for next visit - uses phone bola.  AVS printed and provided to patient today.    FOLLOW-UP:  Education topics to cover at the next diabetes education visit(s): complication prevention, sick days, review BG's  Chart routed to referring provider.    Ongoing plan for education and support: Written resources (magazines, books, etc.), Follow-up visit with diabetes educator in 1 month and Follow-up with primary care provider    ROSA Mcmanus CDE    Time Spent: 65 minutes  Encounter Type: Individual    Any diabetes medication dose changes were made via the CDE Protocol and Collaborative Practice Agreement with the patient's referring provider. A copy of this encounter was shared with the provider.

## 2018-02-21 NOTE — MR AVS SNAPSHOT
After Visit Summary   2/21/2018    Rmomel Bryan    MRN: [id number]           Patient Information     Date Of Birth          1955        Visit Information        Provider Department      2/21/2018 2:30 PM CS NUTRITION RESOURCE [facility]        Today's Diagnoses     Type 2 diabetes mellitus without complication, without long-term current use of insulin (H)    -  1      Care Instructions    Heart Healthy & Diabetic Friendly Recipe Sites:   [email or web address]  [email or web address]  American Diabetes Association  American Heart Association          Follow-ups after your visit        Your next 10 appointments already scheduled     Feb 27, 2018  7:30 AM CST   New Patient with Kieran Tomlinson MD   [facility]    [address] [facility] [id number] [phone]            Mar 23, 2018  2:30 PM CDT   Diabetic Education with EC REGISTERED DIETICIAN   [facility] [place] [place]    [address] [address]   [phone]              Who to contact     If you have questions or need follow up information about today's clinic visit or your schedule please contact [facility] [facility] [employer] directly at [phone].  Normal or non-critical lab and imaging results will be communicated to you by [email or web address], letter or phone within 4 business days after the clinic has received the results. If you do not hear from us within 7 days, please contact the clinic through [email or web address] or phone. If you have a critical or abnormal lab result, we will notify you by phone as soon as possible.  Submit refill requests through Bomberbot or call your pharmacy and they will forward the refill request to us. Please allow 3 business days for your refill to be completed.          Additional Information About Your Visit        [email or web address] Information     Bomberbot gives you secure access to your electronic health record. If you see a  primary care provider, you can also send messages to your care team and make appointments. If you have questions, please call your primary care clinic.  If you do not have a primary care provider, please call [phone] and they will assist you.        Care EveryWhere ID     This is your Care EveryWhere ID. This could be used by other organizations to access your [place] medical records  [id number]         Blood Pressure from Last 3 Encounters:   01/23/18 150/82   01/11/18 128/79   03/09/16 138/83    Weight from Last 3 Encounters:   01/23/18 119.7 kg (264 lb)   01/11/18 119.7 kg (264 lb)   03/09/16 130.2 kg (287 lb)              Today, you had the following     No orders found for display       Primary Care Provider Office Phone # Fax #    Kieran Tomlinson [phone] [address]  [facility] [address]        Equal Access to Services     [facility]: Hadii aad ku hadasho Soomaali, waaxda luqadaha, qaybta kaalmada adeegyada, waxay idiin hayaan yanira young . So [facility] [phone].    ATENCIÓN: Si habla español, tiene a conroy disposición servicios gratuitos de asistencia lingüística. Llame al [phone].    We comply with applicable federal civil rights laws and [place] laws. We do not discriminate on the basis of race, color, national origin, age, disability, sex, sexual orientation, or gender identity.            Thank you!     Thank you for choosing [facility]  for your care. Our goal is always to provide you with excellent care. Hearing back from our patients is one way we can continue to improve our services. Please take a few minutes to complete the written survey that you may receive in the mail after your visit with us. Thank you!             Your Updated Medication List - Protect others around you: Learn how to safely use, store and throw away your medicines at www.disposemymeds.org.          This list is accurate as of 2/21/18  3:41 PM.  Always  use your most recent med list.                   Brand Name Dispense Instructions for use Diagnosis    albuterol 108 (90 BASE) MCG/ACT Inhaler    PROAIR HFA/PROVENTIL HFA/VENTOLIN HFA    1 Inhaler    Inhale 2 puffs into the lungs every 6 hours as needed for shortness of breath / dyspnea    Moderate persistent asthma without complication       aspirin 81 MG tablet      Take 1 tablet by mouth daily.        atorvastatin 40 MG tablet    LIPITOR    30 tablet    Take 1 tablet (40 mg) by mouth daily    Hyperlipidemia LDL goal <130       blood glucose monitoring meter device kit    no brand specified    1 kit    Use to test blood sugar bidtimes daily or as directed.    Type 2 diabetes mellitus without complication, without long-term current use of insulin (H)       blood glucose monitoring test strip    no brand specified    200 strip    Use to test blood sugars bid times daily or as directed    Type 2 diabetes mellitus without complication, without long-term current use of insulin (H)       * fluticasone 250 MCG/BLIST Aepb Inhaler    FLOVENT DISKUS    1 Inhaler    Inhale 1 puff into the lungs 2 times daily    Moderate persistent asthma without complication       * fluticasone 100 MCG/BLIST Aepb    FLOVENT DISKUS    3 Inhaler    Inhale 1 puff into the lungs 2 times daily    Moderate persistent asthma without complication       fluticasone-salmeterol 250-50 MCG/DOSE diskus inhaler    ADVAIR    1 Inhaler    Inhale 1 puff into the lungs 2 times daily    Moderate persistent asthma without complication       glipiZIDE 5 MG tablet    GLUCOTROL    60 tablet    Take 1 tablet (5 mg) by mouth every morning (before breakfast)    Type 2 diabetes mellitus without complication, without long-term current use of insulin (H)       metFORMIN 500 MG tablet    GLUCOPHAGE    60 tablet    Take 1 tablet (500 mg) by mouth daily (with dinner)    Type 2 diabetes mellitus without complication, without long-term current use of insulin (H)        montelukast 10 MG tablet    SINGULAIR    30 tablet    Take 1 tablet (10 mg) by mouth At Bedtime    Moderate persistent asthma with acute exacerbation       order for DME     1 each    Equipment being ordered: CPAP mask only    LYRIC (obstructive sleep apnea)       simvastatin 20 MG tablet    ZOCOR    90 tablet    Take 1 tablet (20 mg) by mouth At Bedtime    Hypercholesterolemia       * Notice:  This list has 2 medication(s) that are the same as other medications prescribed for you. Read the directions carefully, and ask your doctor or other care provider to review them with you.

## 2018-02-21 NOTE — PATIENT INSTRUCTIONS
Heart Healthy & Diabetic Friendly Recipe Sites:   [email or web address]  [email or web address]  American Diabetes Association  American Heart Association

## 2018-02-27 ENCOUNTER — OFFICE VISIT (OUTPATIENT)
Dept: FAMILY MEDICINE | Facility: CLINIC | Age: 63
End: 2018-02-27
Payer: COMMERCIAL

## 2018-02-27 VITALS
HEART RATE: 75 BPM | TEMPERATURE: 98.3 F | SYSTOLIC BLOOD PRESSURE: 130 MMHG | BODY MASS INDEX: 37.47 KG/M2 | OXYGEN SATURATION: 96 % | DIASTOLIC BLOOD PRESSURE: 70 MMHG | HEIGHT: 69 IN | WEIGHT: 253 LBS

## 2018-02-27 DIAGNOSIS — E66.01 OBESITY, CLASS III, BMI 40-49.9 (MORBID OBESITY) (H): ICD-10-CM

## 2018-02-27 DIAGNOSIS — G47.33 OSA (OBSTRUCTIVE SLEEP APNEA): ICD-10-CM

## 2018-02-27 DIAGNOSIS — Z01.818 PRE-OP EXAM: Primary | ICD-10-CM

## 2018-02-27 DIAGNOSIS — J45.40 MODERATE PERSISTENT ASTHMA WITHOUT COMPLICATION: ICD-10-CM

## 2018-02-27 DIAGNOSIS — R74.01 TRANSAMINASEMIA: ICD-10-CM

## 2018-02-27 DIAGNOSIS — Z01.818 PREOP GENERAL PHYSICAL EXAM: ICD-10-CM

## 2018-02-27 DIAGNOSIS — E11.9 TYPE 2 DIABETES MELLITUS WITHOUT COMPLICATION, WITHOUT LONG-TERM CURRENT USE OF INSULIN (H): ICD-10-CM

## 2018-02-27 DIAGNOSIS — E78.5 HYPERLIPIDEMIA LDL GOAL <130: ICD-10-CM

## 2018-02-27 LAB
ANION GAP SERPL CALCULATED.3IONS-SCNC: 8 MMOL/L (ref 3–14)
AST SERPL W P-5'-P-CCNC: 25 U/L (ref 0–45)
BUN SERPL-MCNC: 16 MG/DL (ref 7–30)
CALCIUM SERPL-MCNC: 9 MG/DL (ref 8.5–10.1)
CHLORIDE SERPL-SCNC: 105 MMOL/L (ref 94–109)
CHOLEST SERPL-MCNC: 115 MG/DL
CO2 SERPL-SCNC: 24 MMOL/L (ref 20–32)
CREAT SERPL-MCNC: 0.51 MG/DL (ref 0.66–1.25)
ERYTHROCYTE [DISTWIDTH] IN BLOOD BY AUTOMATED COUNT: 13.3 % (ref 10–15)
GFR SERPL CREATININE-BSD FRML MDRD: >90 ML/MIN/1.7M2
GLUCOSE SERPL-MCNC: 77 MG/DL (ref 70–99)
HBA1C MFR BLD: 7.4 % (ref 4.3–6)
HCT VFR BLD AUTO: 42.6 % (ref 40–53)
HDLC SERPL-MCNC: 35 MG/DL
HGB BLD-MCNC: 14.3 G/DL (ref 13.3–17.7)
LDLC SERPL CALC-MCNC: 67 MG/DL
MCH RBC QN AUTO: 28.3 PG (ref 26.5–33)
MCHC RBC AUTO-ENTMCNC: 33.6 G/DL (ref 31.5–36.5)
MCV RBC AUTO: 84 FL (ref 78–100)
NONHDLC SERPL-MCNC: 80 MG/DL
PLATELET # BLD AUTO: 223 10E9/L (ref 150–450)
POTASSIUM SERPL-SCNC: 3.8 MMOL/L (ref 3.4–5.3)
RBC # BLD AUTO: 5.05 10E12/L (ref 4.4–5.9)
SODIUM SERPL-SCNC: 137 MMOL/L (ref 133–144)
TRIGL SERPL-MCNC: 64 MG/DL
WBC # BLD AUTO: 8 10E9/L (ref 4–11)

## 2018-02-27 PROCEDURE — 80061 LIPID PANEL: CPT | Performed by: INTERNAL MEDICINE

## 2018-02-27 PROCEDURE — 36415 COLL VENOUS BLD VENIPUNCTURE: CPT | Performed by: INTERNAL MEDICINE

## 2018-02-27 PROCEDURE — 80048 BASIC METABOLIC PNL TOTAL CA: CPT | Performed by: INTERNAL MEDICINE

## 2018-02-27 PROCEDURE — 99215 OFFICE O/P EST HI 40 MIN: CPT | Performed by: INTERNAL MEDICINE

## 2018-02-27 PROCEDURE — 84450 TRANSFERASE (AST) (SGOT): CPT | Performed by: INTERNAL MEDICINE

## 2018-02-27 PROCEDURE — 83036 HEMOGLOBIN GLYCOSYLATED A1C: CPT | Performed by: INTERNAL MEDICINE

## 2018-02-27 PROCEDURE — 85027 COMPLETE CBC AUTOMATED: CPT | Performed by: INTERNAL MEDICINE

## 2018-02-27 NOTE — PROGRESS NOTES
[address] [address]  [phone]  Dept: [phone]    PRE-OP EVALUATION:  Today's date: 2018    Rommel Bryan (: 1955) presents for pre-operative evaluation assessment as requested by Dr. Logan.  He requires evaluation and anesthesia risk assessment prior to undergoing surgery/procedure for treatment of cataract surgery .    Proposed Surgery/ Procedure:   Date of Surgery/ Procedure: 3/12 and 3/26/18  Time of Surgery/ Procedure: 7am  Hospital/Surgical Facility:  surgical   [phone]  Primary Physician: Kieran Tomlinson  Type of Anesthesia Anticipated: to be determined    Patient has a Health Care Directive or Living Will:  NO    1. NO - Do you have a history of heart attack, stroke, stent, bypass or surgery on an artery in the head, neck, heart or legs?  2. NO - Do you ever have any pain or discomfort in your chest?  3. NO - Do you have a history of  Heart Failure?  4. NO - Are you troubled by shortness of breath when: walking on the level, up a slight hill or at night?  5. NO - Do you currently have a cold, bronchitis or other respiratory infection?  6. NO - Do you have a cough, shortness of breath or wheezing?  7. NO - Do you sometimes get pains in the calves of your legs when you walk?  8. NO - Do you or anyone in your family have previous history of blood clots?  9. NO - Do you or does anyone in your family have a serious bleeding problem such as prolonged bleeding following surgeries or cuts?  10. NO - Have you ever had problems with anemia or been told to take iron pills?  11. NO - Have you had any abnormal blood loss such as black, tarry or bloody stools, or abnormal vaginal bleeding?  12. NO - Have you ever had a blood transfusion?  13. NO - Have you or any of your relatives ever had problems with anesthesia?  14. NO - Do you have sleep apnea, excessive snoring or daytime drowsiness?  15. NO - Do you have any prosthetic heart  valves?  16. NO - Do you have prosthetic joints?  17. NO - Is there any chance that you may be pregnant?      HPI:     HPI related to upcoming procedure: The patient was getting his annual diabetic eye screening and was told that he has bilateral cataracts that they wanted to treat proactively due to his medical history.      See problem list for active medical problems.  Problems all longstanding and stable, except as noted/documented.  See ROS for pertinent symptoms related to these conditions.                                                                                                    .  DIABETES - Patient has a longstanding history of DiabetesType Type II . Patient is being treated with diet, oral agents, exercise and SMBG and denies significant side effects. Control has been good. Complicating factors include but are not limited to: high cholesterol and obesity. The patient has been self monitoring his blood glucose levels and gets readings of 120s in the morning.                                                                                                            .  HYPERLIPIDEMIA - Patient has a long history of significant Hyperlipidemia requiring medication for treatment with recent good control. Patient reports no problems or side effects with the medication.                                                                                                                                                       .  ASTHMA - Patient has a longstanding history of moderate-severe Asthma . Patient has been doing well overall noting SOB and continues on medication regimen consisting of albuterol without adverse reactions or side effects.                                                                                                                                               .  SLEEP PROBLEM - Patient has a longstanding history of snoring, excessive daytime somnolence, fatigue and periodic leg movement of  moderate severity. Patient has tried OTC medications with limited success.                                                                                                              He denies vision changes, neck problems, back problems, headaches, sinus pressure, chest pain, chest discomfort, heart palpitations, stomach problems, indigestion, heartburn, hematochezia, diarrhea, constipation, urination problems, nocturia, skin changes, rashes, bone pain, muscle pain, joint pain, and weight changes.    The patient currently exercises 2x a week for thirty minutes on an exercise bike.                               MEDICAL HISTORY:     Patient Active Problem List    Diagnosis Date Noted     Type 2 diabetes mellitus without complication, without long-term current use of insulin (H) 01/24/2018     Priority: Medium     Hyperglycemia 01/11/2018     Priority: Medium     Moderate persistent asthma without complication 11/09/2015     Priority: Medium     Advanced directives, counseling/discussion 01/04/2013     Priority: Medium     Discussed advance care planning with patient; however, patient declined at this time. 1/4/2013 Alexandra ESPANA MA           Hyperlipidemia LDL goal <130 01/04/2013     Priority: Medium     Family history of coronary artery disease 01/04/2013     Priority: Medium     Obesity, Class III, BMI 40-49.9 (morbid obesity) (H) 01/04/2013     Priority: Medium     Transaminasemia 01/04/2013     Priority: Medium     LYRIC (obstructive sleep apnea)      Priority: Medium      Past Medical History:   Diagnosis Date     Asthma      Family history of coronary artery disease 1/4/2013     HTN (hypertension)      Hypercholesterolemia      Hyperlipidemia LDL goal <130 1/4/2013     Moderate persistent asthma 1/4/2013     Obesity, Class III, BMI 40-49.9 (morbid obesity) (H) 1/4/2013     LYRIC (obstructive sleep apnea)      Transaminasemia 1/4/2013     Past Surgical History:   Procedure Laterality Date     COLONOSCOPY  3/11/14      COLONOSCOPY  3/11/2014    Procedure: COLONOSCOPY;  colonoscopy;  Surgeon: Alirio Mao MD;  Location:  GI     REPAIR NERVE PRIMARY PERIPHERAL  1/2013     Current Outpatient Prescriptions   Medication Sig Dispense Refill     metFORMIN (GLUCOPHAGE) 500 MG tablet Take 1 tablet (500 mg) by mouth daily (with dinner) 60 tablet 3     glipiZIDE (GLUCOTROL) 5 MG tablet Take 1 tablet (5 mg) by mouth every morning (before breakfast) 60 tablet 1     atorvastatin (LIPITOR) 40 MG tablet Take 1 tablet (40 mg) by mouth daily 30 tablet 1     blood glucose monitoring (NO BRAND SPECIFIED) test strip Use to test blood sugars bid times daily or as directed 200 strip 3     blood glucose monitoring (NO BRAND SPECIFIED) meter device kit Use to test blood sugar bidtimes daily or as directed. 1 kit 3     fluticasone (FLOVENT DISKUS) 250 MCG/BLIST AEPB Inhaler Inhale 1 puff into the lungs 2 times daily 1 Inhaler 0     order for DME Equipment being ordered: CPAP mask only 1 each 0     albuterol (PROAIR HFA, PROVENTIL HFA, VENTOLIN HFA) 108 (90 BASE) MCG/ACT inhaler Inhale 2 puffs into the lungs every 6 hours as needed for shortness of breath / dyspnea 1 Inhaler 1     simvastatin (ZOCOR) 20 MG tablet Take 1 tablet (20 mg) by mouth At Bedtime 90 tablet 3     aspirin 81 MG tablet Take 1 tablet by mouth daily.       [DISCONTINUED] fluticasone (FLOVENT DISKUS) 100 MCG/BLIST AEPB Inhale 1 puff into the lungs 2 times daily 3 Inhaler 3     OTC products: None, except as noted above    No Known Allergies   Latex Allergy: NO    Social History   Substance Use Topics     Smoking status: Never Smoker     Smokeless tobacco: Never Used     Alcohol use No      Comment: maybe 3 glasses a year      History   Drug Use No       REVIEW OF SYSTEMS:   Constitutional, neuro, ENT, endocrine, pulmonary, cardiac, gastrointestinal, genitourinary, musculoskeletal, integument and psychiatric systems are negative, except as otherwise noted.    This document  "serves as a record of the services and decisions personally performed and made by Kieran Tomlinson MD. It was created on his behalf by Anabella Logan, a trained medical scribe. The creation of this document is based the provider's statements to the medical scribe. 2/27/2018  EXAM:   /70  Pulse 75  Temp 98.3  F (36.8  C) (Oral)  Ht 1.74 m (5' 8.5\")  Wt 114.8 kg (253 lb)  SpO2 96%  BMI 37.91 kg/m2    GENERAL APPEARANCE: healthy, alert and no distress     EYES: EOMI,  PERRL     HENT: ear canals and TM's normal and nose and mouth without ulcers or lesions     NECK: no adenopathy, no asymmetry, masses, or scars and thyroid normal to palpation     RESP: lungs clear to auscultation - no rales, rhonchi or wheezes     CV: regular rates and rhythm, normal S1 S2, no S3 or S4 and no murmur, click or rub     ABDOMEN:  soft, nontender, no HSM or masses and bowel sounds normal. Liver was palpable 1 cm below the chondral margin, no masses     MS: extremities normal- no gross deformities noted, no evidence of inflammation in joints, FROM in all extremities. 1-2+ pretibial edema bilaterally, left greater the right  Feet were clean and dry without abnormalities      SKIN: no suspicious lesions or rashes     NEURO: Normal strength and tone, sensory exam grossly normal, mentation intact and speech normal     PSYCH: mentation appears normal. and affect normal/bright     LYMPHATICS: No cervical adenopathy    DIAGNOSTICS:   CBC,BMP,HgbA1c    IMPRESSION:   Reason for surgery/procedure: Bilateral cataract surgery.    The proposed surgical procedure is considered LOW risk.    REVISED CARDIAC RISK INDEX  The patient has the following serious cardiovascular risks for perioperative complications such as (MI, PE, VFib and 3  AV Block):  No serious cardiac risks  INTERPRETATION: 0 risks: Class I (very low risk - 0.4% complication rate)    The patient has the following additional risks for perioperative complications:  No identified " additional risks      ICD-10-CM    1. Pre-op exam Z01.818 AST     Basic metabolic panel     CBC with platelets     Hemoglobin A1c   2. Type 2 diabetes mellitus without complication, without long-term current use of insulin (H) E11.9    3. Obesity, Class III, BMI 40-49.9 (morbid obesity) (H) E66.01    4. Hyperlipidemia LDL goal <130 E78.5 Lipid panel reflex to direct LDL Fasting   5. Moderate persistent asthma without complication J45.40    6. Transaminasemia R74.0    7. LYRIC (obstructive sleep apnea) G47.33    8. Preop general physical exam Z01.818        RECOMMENDATIONS:     --Patient is to take all scheduled medications on the day of surgery EXCEPT for modifications listed below.  Hold glipizide and aspirin the morning of surgery but take after surgery before meal.    APPROVAL GIVEN to proceed with proposed procedure, without further diagnostic evaluation     The information in this document, created by a scribe for me, accurately reflects the services I personally performed and the decisions made by me. I have reviewed and approved this document for accuracy.    Signed Electronically by: Kieran Tomlinson MD    Copy of this evaluation report is provided to requesting physician.    [facility] Preop Guidelines

## 2018-02-27 NOTE — MR AVS SNAPSHOT
After Visit Summary   2/27/2018    Rommel Bryan    MRN: [id number]           Patient Information     Date Of Birth          1955        Visit Information        Provider Department      2/27/2018 7:30 AM Kieran Tomlinson MD [facility]        Today's Diagnoses     Pre-op exam    -  1    Type 2 diabetes mellitus without complication, without long-term current use of insulin (H)        Obesity, Class III, BMI 40-49.9 (morbid obesity) (H)        Hyperlipidemia LDL goal <130        Moderate persistent asthma without complication        Transaminasemia        LYRIC (obstructive sleep apnea)        Preop general physical exam          Care Instructions      Before Your Surgery      Hold glipizide and aspirin the morning of surgery but take after surgery before meal.    Call your surgeon if there is any change in your health. This includes signs of a cold or flu (such as a sore throat, runny nose, cough, rash or fever).    Do not smoke, drink alcohol or take over the counter medicine (unless your surgeon or primary care doctor tells you to) for the 24 hours before and after surgery.    If you take prescribed drugs: Follow your doctor s orders about which medicines to take and which to stop until after surgery.    Eating and drinking prior to surgery: follow the instructions from your surgeon    Take a shower or bath the night before surgery. Use the soap your surgeon gave you to gently clean your skin. If you do not have soap from your surgeon, use your regular soap. Do not shave or scrub the surgery site.  Wear clean pajamas and have clean sheets on your bed.           Follow-ups after your visit        Your next 10 appointments already scheduled     Mar 23, 2018  2:30 PM CDT   Diabetic Education with EC REGISTERED DIETICIAN   [facility] ([facility]    [address] [place] [address]   [phone]              Who to contact      "If you have questions or need follow up information about today's clinic visit or your schedule please contact [facility] directly at [phone].  Normal or non-critical lab and imaging results will be communicated to you by MyChart, letter or phone within 4 business days after the clinic has received the results. If you do not hear from us within 7 days, please contact the clinic through Crunch[employer] or phone. If you have a critical or abnormal lab result, we will notify you by phone as soon as possible.  Submit refill requests through [employer] or call your pharmacy and they will forward the refill request to us. Please allow 3 business days for your refill to be completed.          Additional Information About Your Visit        [employer] Information     [employer] gives you secure access to your electronic health record. If you see a primary care provider, you can also send messages to your care team and make appointments. If you have questions, please call your primary care clinic.  If you do not have a primary care provider, please call [phone] and they will assist you.        Care EveryWhere ID     This is your Care EveryWhere ID. This could be used by other organizations to access your [facility] medical records  [id number]        Your Vitals Were     Pulse Temperature Height Pulse Oximetry BMI (Body Mass Index)       75 98.3  F (36.8  C) (Oral) 5' 8.5\" (1.74 m) 96% 37.91 kg/m2        Blood Pressure from Last 3 Encounters:   02/27/18 130/70   01/23/18 150/82   01/11/18 128/79    Weight from Last 3 Encounters:   02/27/18 253 lb (114.8 kg)   01/23/18 264 lb (119.7 kg)   01/11/18 264 lb (119.7 kg)              We Performed the Following     AST     Basic metabolic panel     CBC with platelets     Hemoglobin A1c     Lipid panel reflex to direct LDL Fasting          Today's Medication Changes          These changes are accurate as of 2/27/18 11:59 PM.  If you have any questions, ask your nurse or doctor.    "            These medicines have changed or have updated prescriptions.        Dose/Directions    fluticasone 250 MCG/BLIST Aepb Inhaler   Commonly known as:  FLOVENT DISKUS   This may have changed:  Another medication with the same name was removed. Continue taking this medication, and follow the directions you see here.   Used for:  Moderate persistent asthma without complication   Changed by:  Kirean Tomlinson MD        Dose:  1 puff   Inhale 1 puff into the lungs 2 times daily   Quantity:  1 Inhaler   Refills:  0         Stop taking these medicines if you haven't already. Please contact your care team if you have questions.     fluticasone-salmeterol 250-50 MCG/DOSE diskus inhaler   Commonly known as:  ADVAIR   Stopped by:  Kieran Tomlinson MD           montelukast 10 MG tablet   Commonly known as:  SINGULAIR   Stopped by:  Kieran Tomlinson MD                    Primary Care Provider Office Phone # Fax #    Kieran Tomlinson [phone] [address] [address]        Equal Access to Services     [facility]: Hadii aad ku hadasho Soomaali, waaxda luqadaha, qaybta kaalmada adeegyada, waxay idiin hayaan yanira young . So [facility] [phone].    ATENCIÓN: Si habla español, tiene a conroy disposición servicios gratuitos de asistencia lingüística. Llame al [phone].    We comply with applicable federal civil rights laws and [place] laws. We do not discriminate on the basis of race, color, national origin, age, disability, sex, sexual orientation, or gender identity.            Thank you!     Thank you for choosing [facility]  for your care. Our goal is always to provide you with excellent care. Hearing back from our patients is one way we can continue to improve our services. Please take a few minutes to complete the written survey that you may receive in the mail after your visit with us. Thank you!             Your Updated Medication List - Protect  others around you: Learn how to safely use, store and throw away your medicines at www.disposemymeds.org.          This list is accurate as of 2/27/18 11:59 PM.  Always use your most recent med list.                   Brand Name Dispense Instructions for use Diagnosis    albuterol 108 (90 BASE) MCG/ACT Inhaler    PROAIR HFA/PROVENTIL HFA/VENTOLIN HFA    1 Inhaler    Inhale 2 puffs into the lungs every 6 hours as needed for shortness of breath / dyspnea    Moderate persistent asthma without complication       aspirin 81 MG tablet      Take 1 tablet by mouth daily.        atorvastatin 40 MG tablet    LIPITOR    30 tablet    Take 1 tablet (40 mg) by mouth daily    Hyperlipidemia LDL goal <130       blood glucose monitoring meter device kit    no brand specified    1 kit    Use to test blood sugar bidtimes daily or as directed.    Type 2 diabetes mellitus without complication, without long-term current use of insulin (H)       blood glucose monitoring test strip    no brand specified    200 strip    Use to test blood sugars bid times daily or as directed    Type 2 diabetes mellitus without complication, without long-term current use of insulin (H)       fluticasone 250 MCG/BLIST Aepb Inhaler    FLOVENT DISKUS    1 Inhaler    Inhale 1 puff into the lungs 2 times daily    Moderate persistent asthma without complication       glipiZIDE 5 MG tablet    GLUCOTROL    60 tablet    Take 1 tablet (5 mg) by mouth every morning (before breakfast)    Type 2 diabetes mellitus without complication, without long-term current use of insulin (H)       metFORMIN 500 MG tablet    GLUCOPHAGE    60 tablet    Take 1 tablet (500 mg) by mouth daily (with dinner)    Type 2 diabetes mellitus without complication, without long-term current use of insulin (H)       order for DME     1 each    Equipment being ordered: CPAP mask only    LYRIC (obstructive sleep apnea)       simvastatin 20 MG tablet    ZOCOR    90 tablet    Take 1 tablet (20 mg) by  mouth At Bedtime    Hypercholesterolemia

## 2018-02-27 NOTE — LETTER
[facility]  [address] [facility]  [address]  [place]  [address]  Tel: [phone]    March 6, 2018    Rommel Bryan  [facility] [address]        Dear  IrvinAlan,    Enclosed are your lab reports from your recent office exam.    The basic metabolic panel shoulders that your minerals and electrolytes in kidney function tests are all normal.    I am happy to see that your lipid panel is remarkably improved. Her total cholesterol was 115 anything less than 200 is normal however the details are related to the triglycerides, HDL or good cholesterol and the LDL or bad cholesterol. The triglycerides were 64, better than 136 from before, anything less than 150 is normal anything less than 100 is better. Your HDL or good cholesterol was 35, anything greater than 40 is normal. The HDL is intimately related to inherited tendencies and regular aerobic activity. The most important value is the LDL or bad cholesterol which was 67, much better than to 11 from before. Our goal is to keep it under 100 and anything less than 70 is better.    Your liver function test was normal, no sign of any effect from your statin.    Your hemoglobin A1c is remarkably better than a month ago at 7.4 down from 10.6. Our goal is to keep it under seven.    Your CBC shows us that there is no sign of low blood or anemia and all of your other blood cell counts were normal as well.    I am glad to see the inner lab reports and as you continue with the changes that you are making I am sure that we will be achieving our goal soon.I hope the surgery goes well and I will anticipate senior back in the office in approximately three months. You are having any questions or problems before then please let me know.    If you have any further questions or problems, please contact our office.      Sincerely,    Kieran Tomlinson MD/ Carolyn Victor, JUAN  Results for orders placed or performed in visit on 02/27/18   AST    Result Value Ref Range    AST 25 0 - 45 U/L   Basic metabolic panel   Result Value Ref Range    Sodium 137 133 - 144 mmol/L    Potassium 3.8 3.4 - 5.3 mmol/L    Chloride 105 94 - 109 mmol/L    Carbon Dioxide 24 20 - 32 mmol/L    Anion Gap 8 3 - 14 mmol/L    Glucose 77 70 - 99 mg/dL    Urea Nitrogen 16 7 - 30 mg/dL    Creatinine 0.51 (L) 0.66 - 1.25 mg/dL    GFR Estimate >90 >60 mL/min/1.7m2    GFR Estimate If Black >90 >60 mL/min/1.7m2    Calcium 9.0 8.5 - 10.1 mg/dL   CBC with platelets   Result Value Ref Range    WBC 8.0 4.0 - 11.0 10e9/L    RBC Count 5.05 4.4 - 5.9 10e12/L    Hemoglobin 14.3 13.3 - 17.7 g/dL    Hematocrit 42.6 40.0 - 53.0 %    MCV 84 78 - 100 fl    MCH 28.3 26.5 - 33.0 pg    MCHC 33.6 31.5 - 36.5 g/dL    RDW 13.3 10.0 - 15.0 %    Platelet Count 223 150 - 450 10e9/L   Hemoglobin A1c   Result Value Ref Range    Hemoglobin A1C 7.4 (H) 4.3 - 6.0 %   Lipid panel reflex to direct LDL Fasting   Result Value Ref Range    Cholesterol 115 <200 mg/dL    Triglycerides 64 <150 mg/dL    HDL Cholesterol 35 (L) >39 mg/dL    LDL Cholesterol Calculated 67 <100 mg/dL    Non HDL Cholesterol 80 <130 mg/dL               Enclosure: Lab Results

## 2018-02-27 NOTE — PATIENT INSTRUCTIONS
Before Your Surgery      Hold glipizide and aspirin the morning of surgery but take after surgery before meal.    Call your surgeon if there is any change in your health. This includes signs of a cold or flu (such as a sore throat, runny nose, cough, rash or fever).    Do not smoke, drink alcohol or take over the counter medicine (unless your surgeon or primary care doctor tells you to) for the 24 hours before and after surgery.    If you take prescribed drugs: Follow your doctor s orders about which medicines to take and which to stop until after surgery.    Eating and drinking prior to surgery: follow the instructions from your surgeon    Take a shower or bath the night before surgery. Use the soap your surgeon gave you to gently clean your skin. If you do not have soap from your surgeon, use your regular soap. Do not shave or scrub the surgery site.  Wear clean pajamas and have clean sheets on your bed.

## 2018-02-27 NOTE — NURSING NOTE
"Chief Complaint   Patient presents with     Pre-Op Exam       Initial /68  Pulse 75  Temp 98.3  F (36.8  C) (Oral)  Ht 5' 8.5\" (1.74 m)  Wt 253 lb (114.8 kg)  SpO2 96%  BMI 37.91 kg/m2 Estimated body mass index is 37.91 kg/(m^2) as calculated from the following:    Height as of this encounter: 5' 8.5\" (1.74 m).    Weight as of this encounter: 253 lb (114.8 kg).  Medication Reconciliation: complete   Carolyn Victor CMA      "

## 2018-03-19 ENCOUNTER — TELEPHONE (OUTPATIENT)
Dept: FAMILY MEDICINE | Facility: CLINIC | Age: 63
End: 2018-03-19

## 2018-03-19 DIAGNOSIS — J45.40 MODERATE PERSISTENT ASTHMA WITHOUT COMPLICATION: ICD-10-CM

## 2018-03-19 NOTE — TELEPHONE ENCOUNTER
Spoke with patient:     To PCP: can we reorder his Flovent Inhaler to 100mcg/blist? Pt states the wrong dose was discontinued at his last OV. He takes 100mcg/blist.       Thank you, Misa ELKINS RN      Advised that he contact [facility] to remove Eye Exam reminder.     Simvastatin D/c'd

## 2018-03-19 NOTE — TELEPHONE ENCOUNTER
Reason for Call:  Other prescription    Detailed comments: Patient called stating that the medications listed on his mychart are listed incorrectly, he said fluticasone (FLOVENT DISKUS) 250 MCG/BLIST AEPB Inhaler is listed but he is actually only taking the 100 mcg. Patient would like simvastatin (ZOCOR) 20 MG tablet removed as he is not taking another statin. Patient also said he would like the reminder for his eye exam to be removed. As he has already his eye exam.     Phone Number Patient can be reached at: Home number on file [phone]    Best Time: anytime     Can we leave a detailed message on this number? YES    Call taken on 3/19/2018 at 11:38 AM by Radha Dsouza

## 2018-03-21 ENCOUNTER — MYC REFILL (OUTPATIENT)
Dept: FAMILY MEDICINE | Facility: CLINIC | Age: 63
End: 2018-03-21

## 2018-03-21 DIAGNOSIS — E78.5 HYPERLIPIDEMIA LDL GOAL <130: ICD-10-CM

## 2018-03-21 DIAGNOSIS — E11.9 TYPE 2 DIABETES MELLITUS WITHOUT COMPLICATION, WITHOUT LONG-TERM CURRENT USE OF INSULIN (H): ICD-10-CM

## 2018-03-21 NOTE — TELEPHONE ENCOUNTER
Message from Briannet:  Original authorizing provider: MD Rommel Castillo would like a refill of the following medications:  metFORMIN (GLUCOPHAGE) 500 MG tablet [Kieran Tomlinson MD]  glipiZIDE (GLUCOTROL) 5 MG tablet [Kieran Tomlinson MD]  atorvastatin (LIPITOR) 40 MG tablet [Kieran Tomlinson MD]  blood glucose monitoring (NO BRAND SPECIFIED) test strip [Kieran Tomlinson MD]    Preferred pharmacy: [facility] [address], [address]    Comment:

## 2018-03-22 DIAGNOSIS — E78.5 HYPERLIPIDEMIA LDL GOAL <130: ICD-10-CM

## 2018-03-22 RX ORDER — GLIPIZIDE 5 MG/1
5 TABLET ORAL
Qty: 60 TABLET | Refills: 1 | Status: SHIPPED | OUTPATIENT
Start: 2018-03-22 | End: 2018-09-28

## 2018-03-22 RX ORDER — ATORVASTATIN CALCIUM 40 MG/1
40 TABLET, FILM COATED ORAL DAILY
Qty: 30 TABLET | Refills: 1 | Status: SHIPPED | OUTPATIENT
Start: 2018-03-22 | End: 2018-05-23

## 2018-03-23 ENCOUNTER — ALLIED HEALTH/NURSE VISIT (OUTPATIENT)
Dept: NUTRITION | Facility: CLINIC | Age: 63
End: 2018-03-23
Payer: COMMERCIAL

## 2018-03-23 DIAGNOSIS — E11.9 TYPE 2 DIABETES MELLITUS WITHOUT COMPLICATION, WITHOUT LONG-TERM CURRENT USE OF INSULIN (H): Primary | ICD-10-CM

## 2018-03-23 PROCEDURE — G0108 DIAB MANAGE TRN  PER INDIV: HCPCS

## 2018-03-23 RX ORDER — ATORVASTATIN CALCIUM 40 MG/1
TABLET, FILM COATED ORAL
OUTPATIENT
Start: 2018-03-23

## 2018-03-23 NOTE — MR AVS SNAPSHOT
After Visit Summary   3/23/2018    Rommel Bryan    MRN: [id number]           Patient Information     Date Of Birth          1955        Visit Information        Provider Department      3/23/2018 2:30 PM EC REGISTERED DIETICIAN [facility] [place]        Today's Diagnoses     Type 2 diabetes mellitus without complication, without long-term current use of insulin (H)    -  1       Follow-ups after your visit        Who to contact     If you have questions or need follow up information about today's clinic visit or your schedule please contact [facility] directly at [phone].  Normal or non-critical lab and imaging results will be communicated to you by [employer], letter or phone within 4 business days after the clinic has received the results. If you do not hear from us within 7 days, please contact the clinic through [employer] or phone. If you have a critical or abnormal lab result, we will notify you by phone as soon as possible.  Submit refill requests through [employer] or call your pharmacy and they will forward the refill request to us. Please allow 3 business days for your refill to be completed.          Additional Information About Your Visit        MyChart Information     [employer] gives you secure access to your electronic health record. If you see a primary care provider, you can also send messages to your care team and make appointments. If you have questions, please call your primary care clinic.  If you do not have a primary care provider, please call [phone] and they will assist you.        Care EveryWhere ID     This is your Care EveryWhere ID. This could be used by other organizations to access your [facility] medical records  [id number]         Blood Pressure from Last 3 Encounters:   02/27/18 130/70   01/23/18 150/82   01/11/18 128/79    Weight from Last 3 Encounters:   02/27/18 114.8 kg (253 lb)   01/23/18 119.7 kg (264 lb)   01/11/18  119.7 kg (264 lb)              We Performed the Following     DIABETES EDUCATION - Individual  []        Primary Care Provider Office Phone # Fax #    Kieran Tomlinson [phone] [address] [place] [address]        Equal Access to Services     [facility] : Hadii aad ku hadasho Soomaali, waaxda luqadaha, qaybta kaalmada adeegyada, waxay idiin hayaan adeeg khtravon la'aan . So [facility] [phone].    ATENCIÓN: Si habla español, tiene a conroy disposición servicios gratuitos de asistencia lingüística. [facility] [phone].    We comply with applicable federal civil rights laws and [place] laws. We do not discriminate on the basis of race, color, national origin, age, disability, sex, sexual orientation, or gender identity.            Thank you!     Thank you for choosing [facility]  for your care. Our goal is always to provide you with excellent care. Hearing back from our patients is one way we can continue to improve our services. Please take a few minutes to complete the written survey that you may receive in the mail after your visit with us. Thank you!             Your Updated Medication List - Protect others around you: Learn how to safely use, store and throw away your medicines at www.disposemymeds.org.          This list is accurate as of 3/23/18  3:41 PM.  Always use your most recent med list.                   Brand Name Dispense Instructions for use Diagnosis    albuterol 108 (90 BASE) MCG/ACT Inhaler    PROAIR HFA/PROVENTIL HFA/VENTOLIN HFA    1 Inhaler    Inhale 2 puffs into the lungs every 6 hours as needed for shortness of breath / dyspnea    Moderate persistent asthma without complication       aspirin 81 MG tablet      Take 1 tablet by mouth daily.        atorvastatin 40 MG tablet    LIPITOR    30 tablet    Take 1 tablet (40 mg) by mouth daily    Hyperlipidemia LDL goal <130       blood glucose monitoring meter device kit    no brand  specified    1 kit    Use to test blood sugar bidtimes daily or as directed.    Type 2 diabetes mellitus without complication, without long-term current use of insulin (H)       blood glucose monitoring test strip    no brand specified    200 strip    Use to test blood sugars bid times daily or as directed    Type 2 diabetes mellitus without complication, without long-term current use of insulin (H)       * fluticasone 250 MCG/BLIST Aepb Inhaler    FLOVENT DISKUS    1 Inhaler    Inhale 1 puff into the lungs 2 times daily    Moderate persistent asthma without complication       * fluticasone 100 MCG/BLIST Aepb    FLOVENT DISKUS    3 Inhaler    Inhale 1 puff into the lungs 2 times daily    Moderate persistent asthma without complication       glipiZIDE 5 MG tablet    GLUCOTROL    60 tablet    Take 1 tablet (5 mg) by mouth every morning (before breakfast)    Type 2 diabetes mellitus without complication, without long-term current use of insulin (H)       metFORMIN 500 MG tablet    GLUCOPHAGE    60 tablet    Take 1 tablet (500 mg) by mouth daily (with dinner)    Type 2 diabetes mellitus without complication, without long-term current use of insulin (H)       order for DME     1 each    Equipment being ordered: CPAP mask only    LYRIC (obstructive sleep apnea)       * Notice:  This list has 2 medication(s) that are the same as other medications prescribed for you. Read the directions carefully, and ask your doctor or other care provider to review them with you.

## 2018-03-23 NOTE — PROGRESS NOTES
Diabetes Self Management Training: Follow-up Visit    Rommel Bryan presents today for education related to Type 2 diabetes.    He is accompanied by self    Patient's diabetes management related comments/concerns: a few BGs higher than he would like but missed a couple pills some days    Patient would like this visit to be focused around the following diabetes-related behaviors and goals: Healthy Eating    ASSESSMENT:  Patient Problem List reviewed for relevant medical history and current medical status.    Current Diabetes Management per Patient:  Taking diabetes medications?   yes:     Diabetes Medication(s)     Biguanides Sig    metFORMIN (GLUCOPHAGE) 500 MG tablet Take 1 tablet (500 mg) by mouth daily (with dinner)    Sulfonylureas Sig    glipiZIDE (GLUCOTROL) 5 MG tablet Take 1 tablet (5 mg) by mouth every morning (before breakfast)      **Has forgotten some pills sometimes.     Patient glucose self monitoring as follows: two times daily.   BG meter: Accu-Chek Guide meter  BG results:   Date Breakfast  Lunch  Dinner  Bedtime    Before After Before After Before After    3/23 122         3/22 128    91     3/21 121    99     3/20 96    84     3/19 114    97     3/18 128    138     3/17 124    111          BG values are: In goal     Patient's most recent   Lab Results   Component Value Date    A1C 7.4 02/27/2018    is not meeting goal of <7.0   -- improved from 10.6%!    Nutrition:  Patient has continued to have smaller portions and reduced carbs. Continues to lose weight.     Breakfast - eggs and abad as a treat OR normally has small bowl of cereal with fruit OR plain oatmeal with fruit OR fruit with cottage cheese  Lunch - salad and fruit   Dinner - chicken with egg bake (spinach, eggs, hamburger, cheese) and blueberries OR fish (salmon or cod) and salad and fruit  Snacks - mixed nuts, cheese at AM snack; cottage cheese or cheese at HS snack    Beverages: water    Cultural/Mandaeism diet restrictions:  "No     Biggest Challenge to Healthy Eating: none    Physical Activity:    Had eye surgery so has not been riding the bike lately. Is walking at work though.     Diabetes Complications:  Acute Complications: At risk for hypoglycemia? yes  Frequency of hypoglycemia: none  Chronic Complication Prevention: Eyes: exam within in the last year? Yes  Nerve/Circulation: foot exam within the last year Yes  Heart Health: BP to goal Yes, LDL to goal Yes, Daily Aspirin Yes  Dental Health: brushing/flossing regularly Yes, dental exam within last year Yes  Immunizations (flu/pneumonia) up to date? Yes    Vitals:  There were no vitals taken for this visit.  Estimated body mass index is 37.91 kg/(m^2) as calculated from the following:    Height as of 2/27/18: 1.74 m (5' 8.5\").    Weight as of 2/27/18: 114.8 kg (253 lb).  --> 244# today.     Last 3 BP:   BP Readings from Last 3 Encounters:   02/27/18 130/70   01/23/18 150/82   01/11/18 128/79       History   Smoking Status     Never Smoker   Smokeless Tobacco     Never Used       Labs:  Lab Results   Component Value Date    A1C 7.4 02/27/2018     Lab Results   Component Value Date    GLC 77 02/27/2018     Lab Results   Component Value Date    LDL 67 02/27/2018     HDL Cholesterol   Date Value Ref Range Status   02/27/2018 35 (L) >39 mg/dL Final   ]  GFR Estimate   Date Value Ref Range Status   02/27/2018 >90 >60 mL/min/1.7m2 Final     Comment:     Non  GFR Calc     GFR Estimate If Black   Date Value Ref Range Status   02/27/2018 >90 >60 mL/min/1.7m2 Final     Comment:      GFR Calc     Lab Results   Component Value Date    CR 0.51 02/27/2018     No results found for: MICROALBUMIN    Health Beliefs and Attitudes:   Patient Activation Measure Survey Score:  JAMMIE Score (Last Two) 2/25/2014   JAMMIE Raw Score 36   Activation Score 47.4   JAMMIE Level 2       Stage of Change: ACTION (Actively working towards change)    Progress toward meeting diabetes-related " behavioral goals:    GOALS % Met Goal   Healthy Eating 75   Physical Activity 50   Monitoring 100   Medication Taking 75   Problem Solving 75   Healthy Coping 100   Risk Reduction 75         Diabetes knowledge and skills assessment:     Patient is knowledgeable in diabetes management concepts related to: Healthy Eating, Being Active, Monitoring, Taking Medication and Healthy Coping    Patient needs further education on the following diabetes management concepts: Healthy Eating, Problem Solving, Reducing Risks and Healthy Coping    Barriers to Learning Assessment: No Barriers identified    Based on learning assessment above, most appropriate setting for further diabetes education would be: Group class or Individual setting.    INTERVENTION:    Education provided today on:  AADE Self-Care Behaviors:  Healthy Eating: consistency in amount, composition, and timing of food intake, weight reduction and heart healthy diet. Praised him on keeping up with his diet changes and weight loss. Encouraged him to continue.   Monitoring: log and interpret results and individual blood glucose targets.   Problem Solving: high blood glucose - causes, signs/symptoms, treatment and prevention and sick day arrangements  Reducing Risks: major complications of diabetes, prevention, early diagnostic measures and treatment of complications, foot care, appropriate dental care, annual eye exam, smoking cessation, aspirin therapy, A1C - goals, relating to blood glucose levels, how often to check, lipids levels and goals and blood pressure and goals  Healthy Coping: benefits of making appropriate lifestyle changes    Opportunities for ongoing education and support in diabetes-self management were discussed.    Pt verbalized understanding of concepts discussed and recommendations provided today.       Education Materials Provided:  Goals for Your Diabetes Care and Foot Care Tips    PLAN:  Meal Plan Recommendation: eat 3 meals a day, have small  snacks between meals, if needed and use portion control  Exercise / activity plan: Continue to walk daily until able to ride bike again.   Check blood sugars fasting  Keep a blood glucose record for next visit.  Keep up the great work with weight loss!    FOLLOW-UP:  Follow-up as needed.   Follow-up yearly for diabetes education review.    Ongoing plan for education and support: Written resources (magazines, books, etc.), Follow-up visit with diabetes educator in 6 months-1 year and Follow-up with primary care provider    ROSA Mcmanus CDE    Time Spent: 60 minutes  Encounter Type: Individual    Any diabetes medication dose changes were made via the CDE Protocol and Collaborative Practice Agreement with the patient's referring provider. A copy of this encounter was shared with the provider.

## 2018-05-23 DIAGNOSIS — E78.5 HYPERLIPIDEMIA LDL GOAL <130: ICD-10-CM

## 2018-05-23 RX ORDER — ATORVASTATIN CALCIUM 40 MG/1
TABLET, FILM COATED ORAL
Qty: 90 TABLET | Refills: 3 | Status: SHIPPED | OUTPATIENT
Start: 2018-05-23 | End: 2019-04-16

## 2018-05-23 NOTE — TELEPHONE ENCOUNTER
"Requested Prescriptions   Pending Prescriptions Disp Refills     atorvastatin (LIPITOR) 40 MG tablet [Pharmacy Med Name: ATORVASTATIN CALCIUM 40MG TABS] 30 tablet 1     Sig: TAKE ONE TABLET BY MOUTH ONCE DAILY    Statins Protocol Passed    5/23/2018  6:11 AM       Passed - LDL on file in past 12 months    Recent Labs   Lab Test  02/27/18   0816   LDL  67            Passed - No abnormal creatine kinase in past 12 months    No lab results found.            Passed - Recent (12 mo) or future (30 days) visit within the authorizing provider's specialty    Patient had office visit in the last 12 months or has a visit in the next 30 days with authorizing provider or within the authorizing provider's specialty.  See \"Patient Info\" tab in inbasket, or \"Choose Columns\" in Meds & Orders section of the refill encounter.           Passed - Patient is age 18 or older        Prescription approved per [facility].  Marybel Umaña RN      "

## 2018-09-17 ENCOUNTER — TRANSFERRED RECORDS (OUTPATIENT)
Dept: HEALTH INFORMATION MANAGEMENT | Facility: CLINIC | Age: 63
End: 2018-09-17

## 2018-09-28 DIAGNOSIS — E11.9 TYPE 2 DIABETES MELLITUS WITHOUT COMPLICATION, WITHOUT LONG-TERM CURRENT USE OF INSULIN (H): ICD-10-CM

## 2018-09-28 NOTE — TELEPHONE ENCOUNTER
"Last Written Prescription Date:  3/22/18  Last Fill Quantity: 60 tablet,  # refills: 1   Last office visit: 2/27/2018 with prescribing provider:  Davi   Future Office Visit:   Next 5 appointments (look out 90 days)     Oct 08, 2018  7:30 AM CDT   Office Visit with Kieran Tomlinson MD   [facility] ([facility])    [address] [facility] [address] [phone]                 Requested Prescriptions   Pending Prescriptions Disp Refills     glipiZIDE (GLUCOTROL) 5 MG tablet [Pharmacy Med Name: GLIPIZIDE 5MG TABS] 60 tablet 1     Sig: TAKE ONE TABLET BY MOUTH EVERY MORNING BEFORE BREAKFAST    Sulfonylurea Agents Failed    9/28/2018  6:01 AM       Failed - Blood pressure less than 140/90 in past 6 months    BP Readings from Last 3 Encounters:   02/27/18 130/70   01/23/18 150/82   01/11/18 128/79                Failed - Patient has documented A1c within the specified period of time.    If HgbA1C is 8 or greater, it needs to be on file within the past 3 months.  If less than 8, must be on file within the past 6 months.     Recent Labs   Lab Test  02/27/18   0816   A1C  7.4*            Failed - Patient has a recent creatinine (normal) within the past 12 mos.    Recent Labs   Lab Test  02/27/18   0816   CR  0.51*            Passed - Patient has documented LDL within the past 12 mos.    Recent Labs   Lab Test  02/27/18   0816   LDL  67            Passed - Patient has had a Microalbumin in the past 12 mos.    Recent Labs   Lab Test  01/11/18   0829   MICROL  50   UMALCR  20.97*            Passed - Patient is age 18 or older       Passed - Recent (6 mo) or future (30 days) visit within the authorizing provider's specialty    Patient had office visit in the last 6 months or has a visit in the next 30 days with authorizing provider or within the authorizing provider's specialty.  See \"Patient Info\" tab in inbasket, or \"Choose Columns\" in Meds & Orders section of the refill encounter.        "

## 2018-09-28 NOTE — TELEPHONE ENCOUNTER
Routing refill request to provider for review/approval because:  Labs out of range:  See below  A break in medication    Evelyn NGUYEN RN,BSN

## 2018-10-01 RX ORDER — GLIPIZIDE 5 MG/1
TABLET ORAL
Qty: 30 TABLET | Refills: 0 | Status: SHIPPED | OUTPATIENT
Start: 2018-10-01 | End: 2018-10-08

## 2018-10-05 NOTE — PROGRESS NOTES
SUBJECTIVE:   Rommel Bryan is a 63 year old male who presents to clinic today for the following health issues:  Patient is managing his blood sugars well checking twice daily with his blood sugar is usually less than 125.  He has a rare low usually related to taking his medications and missing a meal.    He continues to lose weight and is still looking for a regular activity    He is up-to-date on his diabetes follow-up and is seen the ophthalmologist for cataract surgery in the spring and a routine visit 2 weeks ago.  He has noted possibly new or increased floaters that was not well discussed at his recent visit.    Diabetes follow up        Problem list and histories reviewed & adjusted, as indicated.  Additional history: as documented    Current Outpatient Prescriptions   Medication Sig Dispense Refill     albuterol (PROAIR HFA, PROVENTIL HFA, VENTOLIN HFA) 108 (90 BASE) MCG/ACT inhaler Inhale 2 puffs into the lungs every 6 hours as needed for shortness of breath / dyspnea 1 Inhaler 1     aspirin 81 MG tablet Take 1 tablet by mouth daily.       atorvastatin (LIPITOR) 40 MG tablet TAKE ONE TABLET BY MOUTH ONCE DAILY 90 tablet 3     blood glucose monitoring (NO BRAND SPECIFIED) meter device kit Use to test blood sugar bidtimes daily or as directed. 1 kit 3     blood glucose monitoring (NO BRAND SPECIFIED) test strip Use to test blood sugars bid times daily or as directed 200 strip 3     fluticasone (FLOVENT DISKUS) 100 MCG/BLIST AEPB Inhale 1 puff into the lungs 2 times daily 3 Inhaler 1     fluticasone (FLOVENT DISKUS) 250 MCG/BLIST AEPB Inhaler Inhale 1 puff into the lungs 2 times daily 1 Inhaler 0     glipiZIDE (GLUCOTROL) 5 MG tablet TAKE ONE TABLET BY MOUTH EVERY MORNING BEFORE BREAKFAST 90 tablet 3     lisinopril (PRINIVIL/ZESTRIL) 5 MG tablet Take 1 tablet (5 mg) by mouth daily 30 tablet 1     metFORMIN (GLUCOPHAGE) 500 MG tablet Take 1 tablet (500 mg) by mouth daily (with dinner) 60 tablet 3      "order for DME Equipment being ordered: CPAP mask only 1 each 0     [DISCONTINUED] glipiZIDE (GLUCOTROL) 5 MG tablet TAKE ONE TABLET BY MOUTH EVERY MORNING BEFORE BREAKFAST 30 tablet 0       Reviewed and updated as needed this visit by clinical staff       Reviewed and updated as needed this visit by Provider         ROS:  Constitutional, HEENT, cardiovascular, pulmonary, gi and gu systems are negative, except as otherwise noted.    OBJECTIVE:     /74 (BP Location: Left arm, Patient Position: Chair, Cuff Size: Thigh)  Pulse 69  Temp 98.3  F (36.8  C) (Oral)  Ht 5' 8.5\" (1.74 m)  Wt 246 lb (111.6 kg)  SpO2 95%  BMI 36.86 kg/m2  Body mass index is 36.86 kg/(m^2).  GENERAL: healthy, alert and no distress  NECK: no adenopathy, no asymmetry, masses, or scars and thyroid normal to palpation  RESP: lungs clear to auscultation - no rales, rhonchi or wheezes  CV: regular rate and rhythm, normal S1 S2, no S3 or S4, no murmur, click or rub, no peripheral edema and peripheral pulses strong  ABDOMEN: soft, nontender, no hepatosplenomegaly, no masses and bowel sounds normal  MS: no gross musculoskeletal defects noted, no edema        ASSESSMENT/PLAN:             1. Type 2 diabetes mellitus without complication, without long-term current use of insulin (H)  -Recommended adding lisinopril to his routine for diabetic renal protection.  - Hemoglobin A1c  - Albumin Random Urine Quantitative with Creat Ratio  - C FOOT EXAM  NO CHARGE  - Comprehensive metabolic panel  - lisinopril (PRINIVIL/ZESTRIL) 5 MG tablet; Take 1 tablet (5 mg) by mouth daily  Dispense: 30 tablet; Refill: 1  - glipiZIDE (GLUCOTROL) 5 MG tablet; TAKE ONE TABLET BY MOUTH EVERY MORNING BEFORE BREAKFAST  Dispense: 90 tablet; Refill: 3  - FLU VAC, QUADRIVALENT (RIV4) RECOMBINANT DNA, IM    2. Moderate persistent asthma without complication  Breathing has been well controlled he oftentimes has some complication associated with allergies which is most common in " the spring.    - Asthma Action Plan (AAP)    3. Hyperlipidemia LDL goal <130    - Lipid panel reflex to direct LDL Fasting    4. Obesity, Class III, BMI 40-49.9 (morbid obesity) (H)  Ongoing weight reduction  5. LYRIC (obstructive sleep apnea)      6. Transaminasemia    - Comprehensive metabolic panel    Return to clinic in 2-4 weeks for follow-up of his lisinopril with BMP and blood pressure check.  Follow-up on pending laboratory reports and ophthalmology visit.    Kieran Tomlinson MD  [facility]

## 2018-10-08 ENCOUNTER — OFFICE VISIT (OUTPATIENT)
Dept: FAMILY MEDICINE | Facility: CLINIC | Age: 63
End: 2018-10-08
Payer: COMMERCIAL

## 2018-10-08 VITALS
DIASTOLIC BLOOD PRESSURE: 74 MMHG | HEIGHT: 69 IN | BODY MASS INDEX: 36.43 KG/M2 | SYSTOLIC BLOOD PRESSURE: 126 MMHG | TEMPERATURE: 98.3 F | OXYGEN SATURATION: 95 % | HEART RATE: 69 BPM | WEIGHT: 246 LBS

## 2018-10-08 DIAGNOSIS — E78.5 HYPERLIPIDEMIA LDL GOAL <130: ICD-10-CM

## 2018-10-08 DIAGNOSIS — R74.01 TRANSAMINASEMIA: ICD-10-CM

## 2018-10-08 DIAGNOSIS — Z23 FLU VACCINE NEED: ICD-10-CM

## 2018-10-08 DIAGNOSIS — G47.33 OSA (OBSTRUCTIVE SLEEP APNEA): ICD-10-CM

## 2018-10-08 DIAGNOSIS — J45.40 MODERATE PERSISTENT ASTHMA WITHOUT COMPLICATION: ICD-10-CM

## 2018-10-08 DIAGNOSIS — E66.01 OBESITY, CLASS III, BMI 40-49.9 (MORBID OBESITY) (H): ICD-10-CM

## 2018-10-08 DIAGNOSIS — Z23 NEED FOR PROPHYLACTIC VACCINATION AND INOCULATION AGAINST INFLUENZA: ICD-10-CM

## 2018-10-08 DIAGNOSIS — E11.9 TYPE 2 DIABETES MELLITUS WITHOUT COMPLICATION, WITHOUT LONG-TERM CURRENT USE OF INSULIN (H): Primary | ICD-10-CM

## 2018-10-08 LAB
ALBUMIN SERPL-MCNC: 3.8 G/DL (ref 3.4–5)
ALP SERPL-CCNC: 67 U/L (ref 40–150)
ALT SERPL W P-5'-P-CCNC: 33 U/L (ref 0–70)
ANION GAP SERPL CALCULATED.3IONS-SCNC: 8 MMOL/L (ref 3–14)
AST SERPL W P-5'-P-CCNC: 22 U/L (ref 0–45)
BILIRUB SERPL-MCNC: 0.6 MG/DL (ref 0.2–1.3)
BUN SERPL-MCNC: 16 MG/DL (ref 7–30)
CALCIUM SERPL-MCNC: 9.3 MG/DL (ref 8.5–10.1)
CHLORIDE SERPL-SCNC: 106 MMOL/L (ref 94–109)
CHOLEST SERPL-MCNC: 132 MG/DL
CO2 SERPL-SCNC: 26 MMOL/L (ref 20–32)
CREAT SERPL-MCNC: 0.66 MG/DL (ref 0.66–1.25)
CREAT UR-MCNC: 94 MG/DL
GFR SERPL CREATININE-BSD FRML MDRD: >90 ML/MIN/1.7M2
GLUCOSE SERPL-MCNC: 76 MG/DL (ref 70–99)
HBA1C MFR BLD: 5.7 % (ref 0–5.6)
HDLC SERPL-MCNC: 45 MG/DL
LDLC SERPL CALC-MCNC: 68 MG/DL
MICROALBUMIN UR-MCNC: 5 MG/L
MICROALBUMIN/CREAT UR: 5.62 MG/G CR (ref 0–17)
NONHDLC SERPL-MCNC: 87 MG/DL
POTASSIUM SERPL-SCNC: 4 MMOL/L (ref 3.4–5.3)
PROT SERPL-MCNC: 7.7 G/DL (ref 6.8–8.8)
SODIUM SERPL-SCNC: 140 MMOL/L (ref 133–144)
TRIGL SERPL-MCNC: 94 MG/DL

## 2018-10-08 PROCEDURE — 90471 IMMUNIZATION ADMIN: CPT | Performed by: INTERNAL MEDICINE

## 2018-10-08 PROCEDURE — 99214 OFFICE O/P EST MOD 30 MIN: CPT | Mod: 25 | Performed by: INTERNAL MEDICINE

## 2018-10-08 PROCEDURE — 90686 IIV4 VACC NO PRSV 0.5 ML IM: CPT | Performed by: INTERNAL MEDICINE

## 2018-10-08 PROCEDURE — 36415 COLL VENOUS BLD VENIPUNCTURE: CPT | Performed by: INTERNAL MEDICINE

## 2018-10-08 PROCEDURE — 99207 C FOOT EXAM  NO CHARGE: CPT | Performed by: INTERNAL MEDICINE

## 2018-10-08 PROCEDURE — 83036 HEMOGLOBIN GLYCOSYLATED A1C: CPT | Performed by: INTERNAL MEDICINE

## 2018-10-08 PROCEDURE — 82043 UR ALBUMIN QUANTITATIVE: CPT | Performed by: INTERNAL MEDICINE

## 2018-10-08 PROCEDURE — 80061 LIPID PANEL: CPT | Performed by: INTERNAL MEDICINE

## 2018-10-08 PROCEDURE — 80053 COMPREHEN METABOLIC PANEL: CPT | Performed by: INTERNAL MEDICINE

## 2018-10-08 RX ORDER — LISINOPRIL 5 MG/1
5 TABLET ORAL DAILY
Qty: 30 TABLET | Refills: 1 | Status: SHIPPED | OUTPATIENT
Start: 2018-10-08 | End: 2018-12-07

## 2018-10-08 RX ORDER — GLIPIZIDE 5 MG/1
TABLET ORAL
Qty: 90 TABLET | Refills: 3 | Status: SHIPPED | OUTPATIENT
Start: 2018-10-08 | End: 2019-04-16

## 2018-10-08 NOTE — MR AVS SNAPSHOT
After Visit Summary   10/8/2018    Rommel Bryan    MRN: [id number]           Patient Information     Date Of Birth          1955        Visit Information        Provider Department      10/8/2018 7:30 AM Kieran Tomlinson MD [facility]        Today's Diagnoses     Type 2 diabetes mellitus without complication, without long-term current use of insulin (H)    -  1    Moderate persistent asthma without complication        Hyperlipidemia LDL goal <130        Obesity, Class III, BMI 40-49.9 (morbid obesity) (H)        LYRIC (obstructive sleep apnea)        Transaminasemia        Flu vaccine need        Need for prophylactic vaccination and inoculation against influenza           Follow-ups after your visit        Follow-up notes from your care team     Return in about 2 weeks (around 10/22/2018).      Who to contact     If you have questions or need follow up information about today's clinic visit or your schedule please contact [facility] directly at [phone].  Normal or non-critical lab and imaging results will be communicated to you by Glassbeamhart, letter or phone within 4 business days after the clinic has received the results. If you do not hear from us within 7 days, please contact the clinic through Glassbeamhart or phone. If you have a critical or abnormal lab result, we will notify you by phone as soon as possible.  Submit refill requests through [employer] or call your pharmacy and they will forward the refill request to us. Please allow 3 business days for your refill to be completed.          Additional Information About Your Visit        MyChart Information     [employer] gives you secure access to your electronic health record. If you see a primary care provider, you can also send messages to your care team and make appointments. If you have questions, please call your primary care clinic.  If you do not have a primary care provider, please call [phone] and they  "will assist you.        Care EveryWhere ID     This is your Care EveryWhere ID. This could be used by other organizations to access your [facility] medical records  [id number]        Your Vitals Were     Pulse Temperature Height Pulse Oximetry BMI (Body Mass Index)       69 98.3  F (36.8  C) (Oral) 5' 8.5\" (1.74 m) 95% 36.86 kg/m2        Blood Pressure from Last 3 Encounters:   10/08/18 126/74   02/27/18 130/70   01/23/18 150/82    Weight from Last 3 Encounters:   10/08/18 246 lb (111.6 kg)   02/27/18 253 lb (114.8 kg)   01/23/18 264 lb (119.7 kg)              We Performed the Following     Albumin Random Urine Quantitative with Creat Ratio     Asthma Action Plan (AAP)     C FOOT EXAM  NO CHARGE     Comprehensive metabolic panel     FLU VAC PRESRV FREE QUAD SPLIT VIR, IM (3+ YRS)     Hemoglobin A1c     Lipid panel reflex to direct LDL Fasting     Vaccine Administration, Initial [34262]          Today's Medication Changes          These changes are accurate as of 10/8/18  8:31 AM.  If you have any questions, ask your nurse or doctor.               Start taking these medicines.        Dose/Directions    lisinopril 5 MG tablet   Commonly known as:  PRINIVIL/ZESTRIL   Used for:  Type 2 diabetes mellitus without complication, without long-term current use of insulin (H)   Started by:  Kieran Tomlinson MD        Dose:  5 mg   Take 1 tablet (5 mg) by mouth daily   Quantity:  30 tablet   Refills:  1            Where to get your medicines      These medications were sent to [facility] [place], [place] - [address], [address]  [address], [address], [facility] [address]     Phone:  [phone]     glipiZIDE 5 MG tablet    lisinopril 5 MG tablet                Primary Care Provider Office Phone # Fax #    Kieran Tomlinson [phone] [address]  [facility] [address]        Equal Access to Services     ANA MCCARTHY AH: zeeshan White, " cristian shayyhaseeb yuryhaley hilario marianoin hayaan adeeg kharash la'aan ah. So [facility] [phone].    ATENCIÓN: Si epifanio allan, tiene a conroy disposición servicios gratuitos de asistencia lingüística. Brett al [phone].    We comply with applicable federal civil rights laws and [place] laws. We do not discriminate on the basis of race, color, national origin, age, disability, sex, sexual orientation, or gender identity.            Thank you!     Thank you for choosing [facility]  for your care. Our goal is always to provide you with excellent care. Hearing back from our patients is one way we can continue to improve our services. Please take a few minutes to complete the written survey that you may receive in the mail after your visit with us. Thank you!             Your Updated Medication List - Protect others around you: Learn how to safely use, store and throw away your medicines at www.disposemymeds.org.          This list is accurate as of 10/8/18  8:31 AM.  Always use your most recent med list.                   Brand Name Dispense Instructions for use Diagnosis    albuterol 108 (90 Base) MCG/ACT inhaler    PROAIR HFA/PROVENTIL HFA/VENTOLIN HFA    1 Inhaler    Inhale 2 puffs into the lungs every 6 hours as needed for shortness of breath / dyspnea    Moderate persistent asthma without complication       aspirin 81 MG tablet      Take 1 tablet by mouth daily.        atorvastatin 40 MG tablet    LIPITOR    90 tablet    TAKE ONE TABLET BY MOUTH ONCE DAILY    Hyperlipidemia LDL goal <130       blood glucose monitoring meter device kit    no brand specified    1 kit    Use to test blood sugar bidtimes daily or as directed.    Type 2 diabetes mellitus without complication, without long-term current use of insulin (H)       blood glucose monitoring test strip    no brand specified    200 strip    Use to test blood sugars bid times daily or as directed    Type 2 diabetes mellitus without complication, without  long-term current use of insulin (H)       * fluticasone 250 MCG/BLIST Aepb Inhaler    FLOVENT DISKUS    1 Inhaler    Inhale 1 puff into the lungs 2 times daily    Moderate persistent asthma without complication       * fluticasone 100 MCG/BLIST Aepb    FLOVENT DISKUS    3 Inhaler    Inhale 1 puff into the lungs 2 times daily    Moderate persistent asthma without complication       glipiZIDE 5 MG tablet    GLUCOTROL    90 tablet    TAKE ONE TABLET BY MOUTH EVERY MORNING BEFORE BREAKFAST    Type 2 diabetes mellitus without complication, without long-term current use of insulin (H)       lisinopril 5 MG tablet    PRINIVIL/ZESTRIL    30 tablet    Take 1 tablet (5 mg) by mouth daily    Type 2 diabetes mellitus without complication, without long-term current use of insulin (H)       metFORMIN 500 MG tablet    GLUCOPHAGE    60 tablet    Take 1 tablet (500 mg) by mouth daily (with dinner)    Type 2 diabetes mellitus without complication, without long-term current use of insulin (H)       order for DME     1 each    Equipment being ordered: CPAP mask only    LYRIC (obstructive sleep apnea)       * Notice:  This list has 2 medication(s) that are the same as other medications prescribed for you. Read the directions carefully, and ask your doctor or other care provider to review them with you.

## 2018-10-08 NOTE — LETTER
My Asthma Action Plan  Name: Rommel Bryan   YOB: 1955  Date: 10/8/2018   My doctor: Kieran Tomlinson MD   My clinic: [facility]        My Control Medicine:  My Rescue Medicine:                GREEN ZONE   Good Control    I feel good    No cough or wheeze    Can work, sleep and play without asthma symptoms       Take your asthma control medicine every day.     1. If exercise triggers your asthma, take your rescue medication    15 minutes before exercise or sports, and    During exercise if you have asthma symptoms  2. Spacer to use with inhaler: If you have a spacer, make sure to use it with your inhaler             YELLOW ZONE Getting Worse  I have ANY of these:    I do not feel good    Cough or wheeze    Chest feels tight    Wake up at night   1. Keep taking your Green Zone medications  2. Start taking your rescue medicine:    every 20 minutes for up to 1 hour. Then every 4 hours for 24-48 hours.  3. If you stay in the Yellow Zone for more than 12-24 hours, contact your doctor.  4. If you do not return to the Green Zone in 12-24 hours or you get worse, start taking your oral steroid medicine if prescribed by your provider.           RED ZONE Medical Alert - Get Help  I have ANY of these:    I feel awful    Medicine is not helping    Breathing getting harder    Trouble walking or talking    Nose opens wide to breathe       1. Take your rescue medicine NOW  2. If your provider has prescribed an oral steroid medicine, start taking it NOW  3. Call your doctor NOW  4. If you are still in the Red Zone after 20 minutes and you have not reached your doctor:    Take your rescue medicine again and    Call 911 or go to the emergency room right away    See your regular doctor within 2 weeks of an Emergency Room or Urgent Care visit for follow-up treatment.          Annual Reminders:  Meet with Asthma Educator,  Flu Shot in the Fall, consider Pneumonia Vaccination for patients with asthma  (aged 19 and older).    Pharmacy: [facility], [place] - [address], [address]                      Asthma Triggers  How To Control Things That Make Your Asthma Worse    Triggers are things that make your asthma worse.  Look at the list below to help you find your triggers and what you can do about them.  You can help prevent asthma flare-ups by staying away from your triggers.      Trigger                                                          What you can do   Cigarette Smoke  Tobacco smoke can make asthma worse. Do not allow smoking in your home, car or around you.  Be sure no one smokes at a child s day care or school.  If you smoke, ask your health care provider for ways to help you quit.  Ask family members to quit too.  Ask your health care provider for a referral to Quit Plan to help you quit smoking, or call 3-740-157-PLAN.     Colds, Flu, Bronchitis  These are common triggers of asthma. Wash your hands often.  Don t touch your eyes, nose or mouth.  Get a flu shot every year.     Dust Mites  These are tiny bugs that live in cloth or carpet. They are too small to see. Wash sheets and blankets in hot water every week.   Encase pillows and mattress in dust mite proof covers.  Avoid having carpet if you can. If you have carpet, vacuum weekly.   Use a dust mask and HEPA vacuum.   Pollen and Outdoor Mold  Some people are allergic to trees, grass, or weed pollen, or molds. Try to keep your windows closed.  Limit time out doors when pollen count is high.   Ask you health care provider about taking medicine during allergy season.     Animal Dander  Some people are allergic to skin flakes, urine or saliva from pets with fur or feathers. Keep pets with fur or feathers out of your home.    If you can t keep the pet outdoors, then keep the pet out of your bedroom.  Keep the bedroom door closed.  Keep pets off cloth furniture and away from stuffed toys.     Mice, Rats, and  Cockroaches  Some people are allergic to the waste from these pests.   Cover food and garbage.  Clean up spills and food crumbs.  Store grease in the refrigerator.   Keep food out of the bedroom.   Indoor Mold  This can be a trigger if your home has high moisture. Fix leaking faucets, pipes, or other sources of water.   Clean moldy surfaces.  Dehumidify basement if it is damp and smelly.   Smoke, Strong Odors, and Sprays  These can reduce air quality. Stay away from strong odors and sprays, such as perfume, powder, hair spray, paints, smoke incense, paint, cleaning products, candles and new carpet.   Exercise or Sports  Some people with asthma have this trigger. Be active!  Ask your doctor about taking medicine before sports or exercise to prevent symptoms.    Warm up for 5-10 minutes before and after sports or exercise.     Other Triggers of Asthma  Cold air:  Cover your nose and mouth with a scarf.  Sometimes laughing or crying can be a trigger.  Some medicines and food can trigger asthma.

## 2018-10-08 NOTE — PROGRESS NOTES
Injectable Influenza Immunization Documentation    1.  Is the person to be vaccinated sick today?   No    2. Does the person to be vaccinated have an allergy to a component   of the vaccine?   No  Egg Allergy Algorithm Link    3. Has the person to be vaccinated ever had a serious reaction   to influenza vaccine in the past?   No    4. Has the person to be vaccinated ever had Guillain-Barré syndrome?   No    Form completed by Carolyn Victor CMA  Prior to injection verified patient identity using patient's name and date of birth.  Due to injection administration, patient instructed to remain in clinic for 15 minutes  afterwards, and to report any adverse reaction to me immediately.

## 2018-10-09 ASSESSMENT — ASTHMA QUESTIONNAIRES: ACT_TOTALSCORE: 24

## 2018-10-14 ENCOUNTER — MYC REFILL (OUTPATIENT)
Dept: FAMILY MEDICINE | Facility: CLINIC | Age: 63
End: 2018-10-14

## 2018-10-14 DIAGNOSIS — E11.9 TYPE 2 DIABETES MELLITUS WITHOUT COMPLICATION, WITHOUT LONG-TERM CURRENT USE OF INSULIN (H): ICD-10-CM

## 2018-10-15 NOTE — TELEPHONE ENCOUNTER
Message from MyChart:  Original authorizing provider: MD Rommel Castillo would like a refill of the following medications:  metFORMIN (GLUCOPHAGE) 500 MG tablet [Kieran Tomlinson MD]    Preferred pharmacy: [facility], [place] - [address], [address]    Comment:

## 2018-10-15 NOTE — TELEPHONE ENCOUNTER
"Pending Prescriptions:                       Disp   Refills    metFORMIN (GLUCOPHAGE) 500 MG tablet      60 tab*3            Sig: Take 1 tablet (500 mg) by mouth daily (with           dinner)      Last Written Prescription Date:  01/23/2018  Last Fill Quantity: 60,  # refills: 3   Last office visit: 10/8/2018 with prescribing provider:     Future Office Visit:    Requested Prescriptions   Pending Prescriptions Disp Refills     metFORMIN (GLUCOPHAGE) 500 MG tablet 60 tablet 3     Sig: Take 1 tablet (500 mg) by mouth daily (with dinner)    Biguanide Agents Passed    10/15/2018  9:51 AM       Passed - Blood pressure less than 140/90 in past 6 months    BP Readings from Last 3 Encounters:   10/08/18 126/74   02/27/18 130/70   01/23/18 150/82                Passed - Patient has documented LDL within the past 12 mos.    Recent Labs   Lab Test  10/08/18   0807   LDL  68            Passed - Patient has had a Microalbumin in the past 15 mos.    Recent Labs   Lab Test  10/08/18   0758   MICROL  5   UMALCR  5.62            Passed - Patient is age 10 or older       Passed - Patient has documented A1c within the specified period of time.    If HgbA1C is 8 or greater, it needs to be on file within the past 3 months.  If less than 8, must be on file within the past 6 months.     Recent Labs   Lab Test  10/08/18   0807   A1C  5.7*            Passed - Patient's CR is NOT>1.4 OR Patient's EGFR is NOT<45 within past 12 mos.    Recent Labs   Lab Test  10/08/18   0807   GFRESTIMATED  >90   GFRESTBLACK  >90       Recent Labs   Lab Test  10/08/18   0807   CR  0.66            Passed - Patient does NOT have a diagnosis of CHF.       Passed - Recent (6 mo) or future (30 days) visit within the authorizing provider's specialty    Patient had office visit in the last 6 months or has a visit in the next 30 days with authorizing provider or within the authorizing provider's specialty.  See \"Patient Info\" tab in inbasket, or \"Choose " "Columns\" in Meds & Orders section of the refill encounter.              "

## 2018-11-04 ENCOUNTER — MYC REFILL (OUTPATIENT)
Dept: FAMILY MEDICINE | Facility: CLINIC | Age: 63
End: 2018-11-04

## 2018-11-04 DIAGNOSIS — E11.9 TYPE 2 DIABETES MELLITUS WITHOUT COMPLICATION, WITHOUT LONG-TERM CURRENT USE OF INSULIN (H): ICD-10-CM

## 2018-11-05 RX ORDER — GLIPIZIDE 5 MG/1
TABLET ORAL
Start: 2018-11-05

## 2018-11-05 RX ORDER — LISINOPRIL 5 MG/1
5 TABLET ORAL DAILY
Qty: 30 TABLET | Refills: 1
Start: 2018-11-05

## 2018-11-05 NOTE — TELEPHONE ENCOUNTER
"lisinopril (PRINIVIL/ZESTRIL) 5 MG tablet  Last Written Prescription Date:  10/8/2018  Last Fill Quantity: 30,  # refills: 1   Last office visit: 10/8/2018 with prescribing provider:  Kieran Tomlinson MD   Future Office Visit:  NA      Requested Prescriptions   Pending Prescriptions Disp Refills     lisinopril (PRINIVIL/ZESTRIL) 5 MG tablet 30 tablet 1     Sig: Take 1 tablet (5 mg) by mouth daily    ACE Inhibitors (Including Combos) Protocol Passed    11/5/2018 10:13 AM       Passed - Blood pressure under 140/90 in past 12 months    BP Readings from Last 3 Encounters:   10/08/18 126/74   02/27/18 130/70   01/23/18 150/82                Passed - Recent (12 mo) or future (30 days) visit within the authorizing provider's specialty    Patient had office visit in the last 12 months or has a visit in the next 30 days with authorizing provider or within the authorizing provider's specialty.  See \"Patient Info\" tab in inbasket, or \"Choose Columns\" in Meds & Orders section of the refill encounter.             Passed - Patient is age 18 or older       Passed - Normal serum creatinine on file in past 12 months    Recent Labs   Lab Test  10/08/18   0807   CR  0.66            Passed - Normal serum potassium on file in past 12 months    Recent Labs   Lab Test  10/08/18   0807   POTASSIUM  4.0               "

## 2018-11-05 NOTE — TELEPHONE ENCOUNTER
Too soon to fill, should have 1 refill remaining.     Schedule OV to discuss further refills. (New Med from last OV, PCP sent #[phone], R-1)    Misa ELKINS RN

## 2018-11-05 NOTE — TELEPHONE ENCOUNTER
"GLIPIZIDE 5MG TABS  Last Written Prescription Date:  10/8/2018  Last Fill Quantity: 90,  # refills: 3   Last office visit: 10/8/2018 with prescribing provider:  Kieran Tomlinson MD   Future Office Visit:  NA    Requested Prescriptions   Pending Prescriptions Disp Refills     glipiZIDE (GLUCOTROL) 5 MG tablet [Pharmacy Med Name: GLIPIZIDE 5MG TABS] 60 tablet 1     Sig: TAKE ONE TABLET BY MOUTH EVERY MORNING BEFORE BREAKFAST    Sulfonylurea Agents Passed    11/4/2018  5:17 AM       Passed - Blood pressure less than 140/90 in past 6 months    BP Readings from Last 3 Encounters:   10/08/18 126/74   02/27/18 130/70   01/23/18 150/82                Passed - Patient has documented LDL within the past 12 mos.    Recent Labs   Lab Test  10/08/18   0807   LDL  68            Passed - Patient has had a Microalbumin in the past 12 mos.    Recent Labs   Lab Test  10/08/18   0758   MICROL  5   UMALCR  5.62            Passed - Patient has documented A1c within the specified period of time.    If HgbA1C is 8 or greater, it needs to be on file within the past 3 months.  If less than 8, must be on file within the past 6 months.     Recent Labs   Lab Test  10/08/18   0807   A1C  5.7*            Passed - Patient is age 18 or older       Passed - Patient has a recent creatinine (normal) within the past 12 mos.    Recent Labs   Lab Test  10/08/18   0807   CR  0.66            Passed - Recent (6 mo) or future (30 days) visit within the authorizing provider's specialty    Patient had office visit in the last 6 months or has a visit in the next 30 days with authorizing provider or within the authorizing provider's specialty.  See \"Patient Info\" tab in inbasket, or \"Choose Columns\" in Meds & Orders section of the refill encounter.              "

## 2018-11-05 NOTE — TELEPHONE ENCOUNTER
Message from MyChart:  Original authorizing provider: MD Rommel Castillo would like a refill of the following medications:  lisinopril (PRINIVIL/ZESTRIL) 5 MG tablet [Kieran Tomlinson MD]    Preferred pharmacy: [facility], [place] - [address], [address]    Comment:

## 2018-11-30 ENCOUNTER — MYC REFILL (OUTPATIENT)
Dept: FAMILY MEDICINE | Facility: CLINIC | Age: 63
End: 2018-11-30

## 2018-11-30 DIAGNOSIS — G47.33 OSA (OBSTRUCTIVE SLEEP APNEA): ICD-10-CM

## 2018-11-30 NOTE — TELEPHONE ENCOUNTER
Message from [employer]:  Original authorizing provider: MD Rommel Valdez would like a refill of the following medications:  order for DME [Yuly Sethi MD]    Preferred pharmacy: [facility]. [address] [address] tel [phone]    Comment:  new a new cpap mask

## 2018-11-30 NOTE — TELEPHONE ENCOUNTER
Requested Prescriptions   Pending Prescriptions Disp Refills     order for DME 1 each 0     Sig: Equipment being ordered: CPAP mask only    There is no refill protocol information for this order        Last Written Prescription Date:  3/9/16  Last Fill Quantity: 1 mask,  # refills: 0   Last office visit: 10/8/2018 with prescribing provider:  Dr. Tomlinson   Future Office Visit:

## 2018-12-07 DIAGNOSIS — E11.9 TYPE 2 DIABETES MELLITUS WITHOUT COMPLICATION, WITHOUT LONG-TERM CURRENT USE OF INSULIN (H): ICD-10-CM

## 2018-12-07 NOTE — TELEPHONE ENCOUNTER
"lisinopril (PRINIVIL/ZESTRIL) 5 MG tablet  Last Written Prescription Date:  10/8/18  Last Fill Quantity: 30,  # refills: 1   Last office visit: 10/8/2018 with prescribing provider:  dorene   Future Office Visit:          Requested Prescriptions   Pending Prescriptions Disp Refills     lisinopril (PRINIVIL/ZESTRIL) 5 MG tablet [Pharmacy Med Name: LISINOPRIL 5MG TABS] 30 tablet 1     Sig: TAKE ONE TABLET BY MOUTH EVERY DAY    ACE Inhibitors (Including Combos) Protocol Passed    12/7/2018 11:59 AM       Passed - Blood pressure under 140/90 in past 12 months    BP Readings from Last 3 Encounters:   10/08/18 126/74   02/27/18 130/70   01/23/18 150/82                Passed - Recent (12 mo) or future (30 days) visit within the authorizing provider's specialty    Patient had office visit in the last 12 months or has a visit in the next 30 days with authorizing provider or within the authorizing provider's specialty.  See \"Patient Info\" tab in inbasket, or \"Choose Columns\" in Meds & Orders section of the refill encounter.             Passed - Patient is age 18 or older       Passed - Normal serum creatinine on file in past 12 months    Recent Labs   Lab Test  10/08/18   0807   CR  0.66            Passed - Normal serum potassium on file in past 12 months    Recent Labs   Lab Test  10/08/18   0807   POTASSIUM  4.0               "

## 2018-12-10 RX ORDER — LISINOPRIL 5 MG/1
TABLET ORAL
Qty: 30 TABLET | Refills: 1 | Status: SHIPPED | OUTPATIENT
Start: 2018-12-10 | End: 2019-02-11

## 2018-12-10 NOTE — TELEPHONE ENCOUNTER
Message from progress note 10/8/18:    Return to clinic in 2-4 weeks for follow-up of his lisinopril with BMP and blood pressure check.  Follow-up on pending laboratory reports and ophthalmology visit.

## 2018-12-14 NOTE — TELEPHONE ENCOUNTER
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 Will keep an eye out for them today in the incoming faxes.

## 2018-12-14 NOTE — TELEPHONE ENCOUNTER
Pt called back and states that [facility] faxed over paper work that needs to be completed before he can get Cpap machine     Pt would like an update on this   Ph. [phone] VM is okay

## 2018-12-19 ENCOUNTER — MYC REFILL (OUTPATIENT)
Dept: FAMILY MEDICINE | Facility: CLINIC | Age: 63
End: 2018-12-19

## 2018-12-19 DIAGNOSIS — G47.33 OSA (OBSTRUCTIVE SLEEP APNEA): ICD-10-CM

## 2018-12-19 NOTE — TELEPHONE ENCOUNTER
Talked with Harriet from [facility] she is going to fax back the form that needs to be filled out for pt CPAP supplies.  Dr Tomlinson please fill out and fax back. I will put form on your desk.

## 2018-12-19 NOTE — TELEPHONE ENCOUNTER
Pt calling to inform us that Dr. Tomlinson needs to specify the type of mask.   home medical told Rommel he is looking for the full face mask (CPT code: a7030) and head gear (CPT code: ).  Please contact  medical supply with this update.  Rommel would  Like to have this taken care of before the end of the year.

## 2018-12-19 NOTE — TELEPHONE ENCOUNTER
Tried calling [facility] to verify exactly what they need from us faxed over, no answer. Left message asking representative to call back     PCP - repended a new DME order with the codes as listed below to re-print and fax.     Iraida ROBERTS RN

## 2018-12-21 NOTE — TELEPHONE ENCOUNTER
Routing refill request to provider for review/approval because:  RN unable to fill.    HARPREET ArnoldN, RN  [employer] [facility]

## 2019-02-11 ENCOUNTER — MYC REFILL (OUTPATIENT)
Dept: FAMILY MEDICINE | Facility: CLINIC | Age: 64
End: 2019-02-11

## 2019-02-11 DIAGNOSIS — E11.9 TYPE 2 DIABETES MELLITUS WITHOUT COMPLICATION, WITHOUT LONG-TERM CURRENT USE OF INSULIN (H): ICD-10-CM

## 2019-02-12 NOTE — TELEPHONE ENCOUNTER
"lisinopril (PRINIVIL/ZESTRIL) 5 MG tablet 30 tablet 1 12/10/2018         Last Written Prescription Date:  12/10/2018  Last Fill Quantity: 30,  # refills: 1   Last office visit: 10/8/2018 with prescribing provider:     Future Office Visit:  Unknown    Requested Prescriptions   Pending Prescriptions Disp Refills     lisinopril (PRINIVIL/ZESTRIL) 5 MG tablet 30 tablet 1     Sig: Take 1 tablet (5 mg) by mouth daily    ACE Inhibitors (Including Combos) Protocol Passed - 2/11/2019  1:48 PM       Passed - Blood pressure under 140/90 in past 12 months    BP Readings from Last 3 Encounters:   10/08/18 126/74   02/27/18 130/70   01/23/18 150/82                Passed - Recent (12 mo) or future (30 days) visit within the authorizing provider's specialty    Patient had office visit in the last 12 months or has a visit in the next 30 days with authorizing provider or within the authorizing provider's specialty.  See \"Patient Info\" tab in inbasket, or \"Choose Columns\" in Meds & Orders section of the refill encounter.             Passed - Medication is active on med list       Passed - Patient is age 18 or older       Passed - Normal serum creatinine on file in past 12 months    Recent Labs   Lab Test 10/08/18  0807   CR 0.66            Passed - Normal serum potassium on file in past 12 months    Recent Labs   Lab Test 10/08/18  0807   POTASSIUM 4.0               "

## 2019-02-12 NOTE — TELEPHONE ENCOUNTER
"Pending Prescriptions:                       Disp   Refills    lisinopril (PRINIVIL/ZESTRIL) 5 MG tablet*30 tab*0            Sig: TAKE ONE TABLET BY MOUTH ONCE DAILY    Last Written Prescription Date:  12/10/18  Last Fill Quantity: 30,  # refills: 1   Last office visit: 10/8/2018 with prescribing provider:     Future Office Visit:    Requested Prescriptions   Pending Prescriptions Disp Refills     lisinopril (PRINIVIL/ZESTRIL) 5 MG tablet [Pharmacy Med Name: LISINOPRIL 5MG TABS] 30 tablet 0     Sig: TAKE ONE TABLET BY MOUTH ONCE DAILY    ACE Inhibitors (Including Combos) Protocol Passed - 2/11/2019 12:03 PM       Passed - Blood pressure under 140/90 in past 12 months    BP Readings from Last 3 Encounters:   10/08/18 126/74   02/27/18 130/70   01/23/18 150/82                Passed - Recent (12 mo) or future (30 days) visit within the authorizing provider's specialty    Patient had office visit in the last 12 months or has a visit in the next 30 days with authorizing provider or within the authorizing provider's specialty.  See \"Patient Info\" tab in inbasket, or \"Choose Columns\" in Meds & Orders section of the refill encounter.             Passed - Medication is active on med list       Passed - Patient is age 18 or older       Passed - Normal serum creatinine on file in past 12 months    Recent Labs   Lab Test 10/08/18  0807   CR 0.66            Passed - Normal serum potassium on file in past 12 months    Recent Labs   Lab Test 10/08/18  0807   POTASSIUM 4.0               "

## 2019-02-13 RX ORDER — LISINOPRIL 5 MG/1
TABLET ORAL
Qty: 30 TABLET | Refills: 0 | OUTPATIENT
Start: 2019-02-13

## 2019-02-13 NOTE — TELEPHONE ENCOUNTER
Routing refill request to provider for review/approval because:  Per last OV notes 10/8/18, pt to follow up in 2 weeks to recheck BP and BMP - MyChart message sent to patient asking him to schedule. 30 day Rx pended    Iraida ROBERTS RN

## 2019-02-13 NOTE — TELEPHONE ENCOUNTER
Duplicate request   [employer] request for this med already routed to PCP to review    Iraida ROBERTS RN

## 2019-02-18 RX ORDER — LISINOPRIL 5 MG/1
5 TABLET ORAL DAILY
Qty: 30 TABLET | Refills: 0 | Status: SHIPPED | OUTPATIENT
Start: 2019-02-18 | End: 2019-04-16

## 2019-03-19 DIAGNOSIS — E11.9 TYPE 2 DIABETES MELLITUS WITHOUT COMPLICATION, WITHOUT LONG-TERM CURRENT USE OF INSULIN (H): ICD-10-CM

## 2019-03-19 PROCEDURE — 36415 COLL VENOUS BLD VENIPUNCTURE: CPT | Performed by: INTERNAL MEDICINE

## 2019-03-19 PROCEDURE — 80048 BASIC METABOLIC PNL TOTAL CA: CPT | Performed by: INTERNAL MEDICINE

## 2019-03-20 LAB
ANION GAP SERPL CALCULATED.3IONS-SCNC: 8 MMOL/L (ref 3–14)
BUN SERPL-MCNC: 23 MG/DL (ref 7–30)
CALCIUM SERPL-MCNC: 9.5 MG/DL (ref 8.5–10.1)
CHLORIDE SERPL-SCNC: 106 MMOL/L (ref 94–109)
CO2 SERPL-SCNC: 25 MMOL/L (ref 20–32)
CREAT SERPL-MCNC: 0.62 MG/DL (ref 0.66–1.25)
GFR SERPL CREATININE-BSD FRML MDRD: >90 ML/MIN/{1.73_M2}
GLUCOSE SERPL-MCNC: 78 MG/DL (ref 70–99)
POTASSIUM SERPL-SCNC: 4.4 MMOL/L (ref 3.4–5.3)
SODIUM SERPL-SCNC: 139 MMOL/L (ref 133–144)

## 2019-04-01 ENCOUNTER — OFFICE VISIT (OUTPATIENT)
Dept: FAMILY MEDICINE | Facility: CLINIC | Age: 64
End: 2019-04-01
Payer: COMMERCIAL

## 2019-04-01 VITALS
DIASTOLIC BLOOD PRESSURE: 77 MMHG | WEIGHT: 264 LBS | HEIGHT: 69 IN | HEART RATE: 68 BPM | SYSTOLIC BLOOD PRESSURE: 131 MMHG | BODY MASS INDEX: 39.1 KG/M2 | OXYGEN SATURATION: 97 % | TEMPERATURE: 97.6 F

## 2019-04-01 DIAGNOSIS — D48.9 NEOPLASM OF UNCERTAIN BEHAVIOR: Primary | ICD-10-CM

## 2019-04-01 PROCEDURE — 99213 OFFICE O/P EST LOW 20 MIN: CPT | Performed by: NURSE PRACTITIONER

## 2019-04-01 ASSESSMENT — MIFFLIN-ST. JEOR: SCORE: 1969.94

## 2019-04-01 NOTE — PROGRESS NOTES
SUBJECTIVE:   Rommel Bryan is a 64 year old male who presents to clinic today for the following health issues:      Bump on Right Ear      Duration: 1 month ago felt a bump on the border of the right pinna which he has picked at a few times and gets fluid and blood from the lesion ; can't describe odor or texture of the fluid     Description (location/character/radiation): top of right ear    Intensity:  moderate    Accompanying signs and symptoms: painful, inflammed, open, had some liquid come out of it    History (similar episodes/previous evaluation): None    Precipitating or alleviating factors: None    Therapies tried and outcome: neosporin       Problem list and histories reviewed & adjusted, as indicated.  Additional history: as documented    Patient Active Problem List   Diagnosis     LYRIC (obstructive sleep apnea)     Advanced directives, counseling/discussion     Hyperlipidemia LDL goal <130     Family history of coronary artery disease     Obesity, Class III, BMI 40-49.9 (morbid obesity) (H)     Transaminasemia     Moderate persistent asthma without complication     Hyperglycemia     Type 2 diabetes mellitus without complication, without long-term current use of insulin (H)     Past Surgical History:   Procedure Laterality Date     COLONOSCOPY  3/11/14     COLONOSCOPY  3/11/2014    Procedure: COLONOSCOPY;  colonoscopy;  Surgeon: Alirio Mao MD;  Location: [facility]     REPAIR NERVE PRIMARY PERIPHERAL  2013       Social History     Tobacco Use     Smoking status: Never Smoker     Smokeless tobacco: Never Used   Substance Use Topics     Alcohol use: No     Comment: maybe 3 glasses a year      Family History   Problem Relation Age of Onset     Neurologic Disorder Mother         alzheimer      Diabetes Mother      Hypertension Mother      Cardiovascular Father         chf      Prostate Cancer Maternal Uncle      Asthma No family hx of      Cancer - colorectal No family hx  of      Anesthesia Reaction No family hx of      Blood Disease No family hx of      Eye Disorder No family hx of          Current Outpatient Medications   Medication Sig Dispense Refill     albuterol (PROAIR HFA, PROVENTIL HFA, VENTOLIN HFA) 108 (90 BASE) MCG/ACT inhaler Inhale 2 puffs into the lungs every 6 hours as needed for shortness of breath / dyspnea 1 Inhaler 1     aspirin 81 MG tablet Take 1 tablet by mouth daily.       atorvastatin (LIPITOR) 40 MG tablet TAKE ONE TABLET BY MOUTH ONCE DAILY 90 tablet 3     blood glucose monitoring (NO BRAND SPECIFIED) meter device kit Use to test blood sugar bidtimes daily or as directed. 1 kit 3     blood glucose monitoring (NO BRAND SPECIFIED) test strip Use to test blood sugars bid times daily or as directed 200 strip 3     fluticasone (FLOVENT DISKUS) 100 MCG/BLIST AEPB Inhale 1 puff into the lungs 2 times daily 3 Inhaler 1     fluticasone (FLOVENT DISKUS) 250 MCG/BLIST AEPB Inhaler Inhale 1 puff into the lungs 2 times daily 1 Inhaler 0     glipiZIDE (GLUCOTROL) 5 MG tablet TAKE ONE TABLET BY MOUTH EVERY MORNING BEFORE BREAKFAST 90 tablet 3     lisinopril (PRINIVIL/ZESTRIL) 5 MG tablet Take 1 tablet (5 mg) by mouth daily 30 tablet 0     metFORMIN (GLUCOPHAGE) 500 MG tablet Take 1 tablet (500 mg) by mouth daily (with dinner) 60 tablet 3     order for DME Equipment being ordered: CPAP mask only 1 each 0     order for DME Equipment being ordered: CPAP mask   full face mask (CPT code: a7030) and head gear (CPT code: ). 1 each 0     No Known Allergies    Reviewed and updated as needed this visit by clinical staff  Tobacco  Allergies  Meds  Problems  Med Hx  Surg Hx  Fam Hx       Reviewed and updated as needed this visit by Provider         ROS:  Constitutional, HEENT, cardiovascular, pulmonary, gi and gu systems are negative, except as otherwise noted.    OBJECTIVE:     /77 (BP Location: Left arm, Cuff Size: Adult Large)   Pulse 68   Temp 97.6  F (36.4  " C) (Oral)   Ht 1.74 m (5' 8.5\")   Wt 119.7 kg (264 lb)   SpO2 97%   BMI 39.56 kg/m    Body mass index is 39.56 kg/m .  GENERAL: healthy, alert and no distress  EYES: Eyes grossly normal to inspection, PERRL and conjunctivae and sclerae normal  SKIN:on the border of the right pinna is a 10mm raised circular lesion with a pearly edged border and central scabbing, no purulence or erythema, mildly tender  Diagnostic Test Results:  none     ASSESSMENT/PLAN:         ICD-10-CM    1. 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

## 2019-04-03 ASSESSMENT — ASTHMA QUESTIONNAIRES: ACT_TOTALSCORE: 22

## 2019-04-09 ENCOUNTER — OFFICE VISIT (OUTPATIENT)
Dept: FAMILY MEDICINE | Facility: CLINIC | Age: 64
End: 2019-04-09
Payer: COMMERCIAL

## 2019-04-09 VITALS — SYSTOLIC BLOOD PRESSURE: 128 MMHG | DIASTOLIC BLOOD PRESSURE: 80 MMHG

## 2019-04-09 DIAGNOSIS — Z80.8 FAMILY HISTORY OF SKIN CANCER: ICD-10-CM

## 2019-04-09 DIAGNOSIS — D48.5 NEOPLASM OF UNCERTAIN BEHAVIOR OF SKIN: Primary | ICD-10-CM

## 2019-04-09 PROCEDURE — 11312 SHAVE SKIN LESION 1.1-2.0 CM: CPT | Performed by: FAMILY MEDICINE

## 2019-04-09 PROCEDURE — 88305 TISSUE EXAM BY PATHOLOGIST: CPT | Mod: TC | Performed by: FAMILY MEDICINE

## 2019-04-09 PROCEDURE — 99213 OFFICE O/P EST LOW 20 MIN: CPT | Mod: 25 | Performed by: FAMILY MEDICINE

## 2019-04-09 NOTE — PROGRESS NOTES
"[facility] - PRIMARY CARE SKIN    CC: Lesion(s)  SUBJECTIVE:   Rommel Bryan is a(n) 64 year old male who presents to clinic today because of a bump on the right ear.    Issue One: Lump on right ear. He has tried to manipulate this lump, thinking that it was a blackhead.  Onset: a couple months.  Enlarging: YES.  Pain or tenderness: YES.    Personal Medical History  Skin cancer: NO  Eczema Psoriasis Autoimmune   NO ?YES on knee when younger NO     Family Medical History  Skin cancer: YES - \"melanoma\" in brother on nose and behind ear  Eczema Psoriasis Autoimmune   NO NO NO     Refer to electronic medical record (EMR) for past medical history and medications.    INTEGUMENTARY/SKIN: POSITIVE for lumps or bumps  ROS: 14 point review of systems was negative except the symptoms listed above in the HPI.    This document serves as a record of the services and decisions personally performed and made by Margie Anna MD and was created by Dino Reaves, a trained medical scribe, based on personal observations and provider statements to the medical scribe.  April 9, 2019 2:12 PM   Dino Reaves    OBJECTIVE:   GENERAL: healthy, alert and no distress.  SKIN: Guallpa Skin Type - II.  Ear examined. The dermatoscope was used to help evaluate pigmented lesions.  Skin Pertinent Findings:  Right ear, posterior helix: 13x9 mm raised lesion with central erosion and rolled borders. ? Nodular basal cell carcinoma ? Other      ASSESSMENT:     Encounter Diagnoses   Name Primary?     Neoplasm of uncertain behavior of skin Yes     Family history of skin cancer      MDM: .  Discussed possible Mohs procedure if pathology indicates skin cancer.    PLAN:   Patient Instructions   FUTURE APPOINTMENTS  Follow up per pathology report.  Follow up for a full-body skin cancer screening.    WOUND CARE INSTRUCTIONS  1. Wash hands before every dressing change.  2. After 24 hours, change dressing daily.  3. Wash the wound area with a " "mild soap, then rinse.  4. Gently pat dry with a sterile gauze or Q-tip.  5. Using a Q-tip, apply Vaseline or Aquaphor only over entire wound. Do NOT use Neosporin - as many people react to neomycin.  6. Finally, cover with a bandage or sterile non-stick gauze with micropore paper tape.  7. Repeat once daily until wound has healed.      Soap, water and shampoo will not hurt this area.    Do not go swimming or take baths, but showering is encouraged.    Limit use of the area where the procedure was done for a few days to allow for optimal healing.    If you experience bleeding:  Wash hands and hold firm pressure on the area for 10 minutes without checking to see if the bleeding has stopped. \"Checking\" pulls off the protective wound clot and restarts the bleeding all over again. Re-apply pressure for 10 minutes if necessary to stop bleeding.  Use additional sterile gauze and tape to maintain pressure once bleeding has stopped.  If bleeding continues, then call back to clinic at [phone].    Signs of Infection:  Infection can occur in any area where skin has been disrupted.  If you notice persistent redness, swelling, colored drainage, increasing pain, fever or other signs of infection, please call us at: [phone] and ask to have me or my colleague paged. We will call you back to discuss.    Pathology Results:  You will be notified, generally via letter or MyChart, in approximately 10 days. If there is anything we need to discuss or further treatment needed, I will call you to discuss it.    PATIENT INFORMATION : WOUNDS  During the healing process you will notice a number of changes. All wounds develop a small halo of redness surrounding the wound.  This means healing is occurring. Severe itching with extensive redness usually indicates sensitivity to the ointment or bandage tape used to dress the wound.  You should call our office if this develops.      Swelling  and/or discoloration around your surgical " site is common, particularly when performed around the eye.    All wounds normally drain.  The larger the wound the more drainage there will be.  After 7-10 days, you will notice the wound beginning to shrink and new skin will begin to grow.  The wound is healed when you can see skin has formed over the entire area.  A healed wound has a healthy, shiny look to the surface and is red to dark pink in color to normalize.  Wounds may take approximately 4-6 weeks to heal.  Larger wounds may take 6-8 weeks. After the wound is healed you may discontinue dressing changes.    You may experience a sensation of tightness as your wound heals. This is normal and will gradually subside.    Your healed wound may be sensitive to temperature changes. This sensitivity improves with time, but if you re having a lot of discomfort, try to avoid temperature extremes.    Patients frequently experience itching after their wound appears to have healed because of the continue healing under the skin.  Plain Vaseline will help relieve the itching.        TT: 20 minutes.  CT: 15 minutes.    The information in this document, created by the medical scribe for me, accurately reflects the services I personally performed and the decisions made by me. I have reviewed and approved this document for accuracy prior to leaving the patient care area.  April 9, 2019 2:12 PM  Margie Anna MD  [facility] [place]

## 2019-04-09 NOTE — PATIENT INSTRUCTIONS
"FUTURE APPOINTMENTS  Follow up per pathology report.  Follow up for a full-body skin cancer screening.    WOUND CARE INSTRUCTIONS  1. Wash hands before every dressing change.  2. After 24 hours, change dressing daily.  3. Wash the wound area with a mild soap, then rinse.  4. Gently pat dry with a sterile gauze or Q-tip.  5. Using a Q-tip, apply Vaseline or Aquaphor only over entire wound. Do NOT use Neosporin - as many people react to neomycin.  6. Finally, cover with a bandage or sterile non-stick gauze with micropore paper tape.  7. Repeat once daily until wound has healed.      Soap, water and shampoo will not hurt this area.    Do not go swimming or take baths, but showering is encouraged.    Limit use of the area where the procedure was done for a few days to allow for optimal healing.    If you experience bleeding:  Wash hands and hold firm pressure on the area for 10 minutes without checking to see if the bleeding has stopped. \"Checking\" pulls off the protective wound clot and restarts the bleeding all over again. Re-apply pressure for 10 minutes if necessary to stop bleeding.  Use additional sterile gauze and tape to maintain pressure once bleeding has stopped.  If bleeding continues, then call back to clinic at [phone].    Signs of Infection:  Infection can occur in any area where skin has been disrupted.  If you notice persistent redness, swelling, colored drainage, increasing pain, fever or other signs of infection, please call us at: [phone] and ask to have me or my colleague paged. We will call you back to discuss.    Pathology Results:  You will be notified, generally via letter or MyChart, in approximately 10 days. If there is anything we need to discuss or further treatment needed, I will call you to discuss it.    PATIENT INFORMATION : WOUNDS  During the healing process you will notice a number of changes. All wounds develop a small halo of redness surrounding the wound.  This means " healing is occurring. Severe itching with extensive redness usually indicates sensitivity to the ointment or bandage tape used to dress the wound.  You should call our office if this develops.      Swelling  and/or discoloration around your surgical site is common, particularly when performed around the eye.    All wounds normally drain.  The larger the wound the more drainage there will be.  After 7-10 days, you will notice the wound beginning to shrink and new skin will begin to grow.  The wound is healed when you can see skin has formed over the entire area.  A healed wound has a healthy, shiny look to the surface and is red to dark pink in color to normalize.  Wounds may take approximately 4-6 weeks to heal.  Larger wounds may take 6-8 weeks. After the wound is healed you may discontinue dressing changes.    You may experience a sensation of tightness as your wound heals. This is normal and will gradually subside.    Your healed wound may be sensitive to temperature changes. This sensitivity improves with time, but if you re having a lot of discomfort, try to avoid temperature extremes.    Patients frequently experience itching after their wound appears to have healed because of the continue healing under the skin.  Plain Vaseline will help relieve the itching.

## 2019-04-09 NOTE — LETTER
"    4/9/2019         RE: Rommel Bryan  [address] [address]        Dear Colleague,    Thank you for [place] [facility] [place] see a copy of my visit note below.    [facility] - PRIMARY CARE SKIN    CC: Lesion(s)  SUBJECTIVE:   Rommel Bryan is a(n) 64 year old male who presents to clinic today because of a bump on the right ear.    Issue One: Lump on right ear. He has tried to manipulate this lump, thinking that it was a blackhead.  Onset: a couple months.  Enlarging: YES.  Pain or tenderness: YES.    Personal Medical History  Skin cancer: NO  Eczema Psoriasis Autoimmune   NO ?YES on knee when younger NO     Family Medical History  Skin cancer: YES - \"melanoma\" in brother on nose and behind ear  Eczema Psoriasis Autoimmune   NO NO NO     Refer to electronic medical record (EMR) for past medical history and medications.    INTEGUMENTARY/SKIN: POSITIVE for lumps or bumps  ROS: 14 point review of systems was negative except the symptoms listed above in the HPI.    This document serves as a record of the services and decisions personally performed and made by Margie Anna MD and was created by Dino Reaves, a trained medical scribe, based on personal observations and provider statements to the medical scribe.  April 9, 2019 2:12 PM   Dino Reaves    OBJECTIVE:   GENERAL: healthy, alert and no distress.  SKIN: Guallpa Skin Type - II.  Ear examined. The dermatoscope was used to help evaluate pigmented lesions.  Skin Pertinent Findings:  Right ear, posterior helix: 13x9 mm raised lesion with central erosion and rolled borders. ? Nodular basal cell carcinoma ? Other      ASSESSMENT:     Encounter Diagnoses   Name Primary?     Neoplasm of uncertain behavior of skin Yes     Family history of skin cancer      MDM: .  Discussed possible Mohs procedure if pathology indicates skin cancer.    PLAN:   Patient " "Instructions   FUTURE APPOINTMENTS  Follow up per pathology report.  Follow up for a full-body skin cancer screening.    WOUND CARE INSTRUCTIONS  1. Wash hands before every dressing change.  2. After 24 hours, change dressing daily.  3. Wash the wound area with a mild soap, then rinse.  4. Gently pat dry with a sterile gauze or Q-tip.  5. Using a Q-tip, apply Vaseline or Aquaphor only over entire wound. Do NOT use Neosporin - as many people react to neomycin.  6. Finally, cover with a bandage or sterile non-stick gauze with micropore paper tape.  7. Repeat once daily until wound has healed.      Soap, water and shampoo will not hurt this area.    Do not go swimming or take baths, but showering is encouraged.    Limit use of the area where the procedure was done for a few days to allow for optimal healing.    If you experience bleeding:  Wash hands and hold firm pressure on the area for 10 minutes without checking to see if the bleeding has stopped. \"Checking\" pulls off the protective wound clot and restarts the bleeding all over again. Re-apply pressure for 10 minutes if necessary to stop bleeding.  Use additional sterile gauze and tape to maintain pressure once bleeding has stopped.  If bleeding continues, then call back to clinic at [phone].    Signs of Infection:  Infection can occur in any area where skin has been disrupted.  If you notice persistent redness, swelling, colored drainage, increasing pain, fever or other signs of infection, please call us at: [phone] and ask to have me or my colleague paged. We will call you back to discuss.    Pathology Results:  You will be notified, generally via letter or MyChart, in approximately 10 days. If there is anything we need to discuss or further treatment needed, I will call you to discuss it.    PATIENT INFORMATION : WOUNDS  During the healing process you will notice a number of changes. All wounds develop a small halo of redness surrounding the " wound.  This means healing is occurring. Severe itching with extensive redness usually indicates sensitivity to the ointment or bandage tape used to dress the wound.  You should call our office if this develops.      Swelling  and/or discoloration around your surgical site is common, particularly when performed around the eye.    All wounds normally drain.  The larger the wound the more drainage there will be.  After 7-10 days, you will notice the wound beginning to shrink and new skin will begin to grow.  The wound is healed when you can see skin has formed over the entire area.  A healed wound has a healthy, shiny look to the surface and is red to dark pink in color to normalize.  Wounds may take approximately 4-6 weeks to heal.  Larger wounds may take 6-8 weeks. After the wound is healed you may discontinue dressing changes.    You may experience a sensation of tightness as your wound heals. This is normal and will gradually subside.    Your healed wound may be sensitive to temperature changes. This sensitivity improves with time, but if you re having a lot of discomfort, try to avoid temperature extremes.    Patients frequently experience itching after their wound appears to have healed because of the continue healing under the skin.  Plain Vaseline will help relieve the itching.        TT: 20 minutes.  CT: 15 minutes.    The information in this document, created by the medical scribe for me, accurately reflects the services I personally performed and the decisions made by me. I have reviewed and approved this document for accuracy prior to leaving the patient care area.  April 9, 2019 2:12 PM  Margie Anna MD  [facility] [place]    Again, thank you for allowing me to participate in the care of your patient.        Sincerely,        Margie Anna MD

## 2019-04-09 NOTE — PROCEDURES
Name : Shave Excision  Indication : Excision of tissue for pathology evaluation.  Location(s) : Right ear, posterior helix: 13x9 mm raised lesion with central erosion and rolled borders. ? Nodular basal cell carcinoma ? Other.  Completed by : Beverly Anna MD  Photo Taken : yes.  Anesthesia : Patient was anesthetized by infiltrating the area surrounding the lesion with 1% lidocaine.   epinephrine [id number] : Yes.  Note : Discussed the risk of pain, infection, scarring, hypo- or hyperpigmentation and recurrence or need for re-treatment. The benefits of treatment and alternative treatments were also discussed.    During this procedure, the universal protocol was utilized. The patient's identity was confirmed by no less than two patient identifiers, correct procedure was verified, correct site was verified and marked as applicable and a final pause was completed.    Sterile technique was used throughout the procedure. The skin was cleaned and prepped with surgical cleanser. Once adequate anesthesia was obtained, the lesion was removed with a deep scallop shave procedure. The specimen was sent to pathology.    Direct pressure and aluminum chloride and monopolar cautery was applied for hemostasis. No bleeding was present upon the completion of the procedure. The wound was coated with antibacterial ointment. A dry sterile dressing was applied. Patient tolerated the procedure well and left in satisfactory condition.    Primary provider and referring provider will be informed regarding the tissue report when it returns.

## 2019-04-11 ENCOUNTER — OFFICE VISIT (OUTPATIENT)
Dept: FAMILY MEDICINE | Facility: CLINIC | Age: 64
End: 2019-04-11
Payer: COMMERCIAL

## 2019-04-11 VITALS — SYSTOLIC BLOOD PRESSURE: 116 MMHG | DIASTOLIC BLOOD PRESSURE: 70 MMHG

## 2019-04-11 DIAGNOSIS — L73.8 SEBACEOUS HYPERPLASIA OF FACE: ICD-10-CM

## 2019-04-11 DIAGNOSIS — L91.8 ACROCHORDON: ICD-10-CM

## 2019-04-11 DIAGNOSIS — L57.0 AK (ACTINIC KERATOSIS): Primary | ICD-10-CM

## 2019-04-11 DIAGNOSIS — R58 ECCHYMOSIS: ICD-10-CM

## 2019-04-11 DIAGNOSIS — D18.01 CAPILLARY HEMANGIOMA OF SKIN: ICD-10-CM

## 2019-04-11 DIAGNOSIS — L81.4 SOLAR LENTIGO: ICD-10-CM

## 2019-04-11 DIAGNOSIS — L84 CORN OR CALLUS: ICD-10-CM

## 2019-04-11 PROCEDURE — 17000 DESTRUCT PREMALG LESION: CPT | Performed by: FAMILY MEDICINE

## 2019-04-11 PROCEDURE — 99213 OFFICE O/P EST LOW 20 MIN: CPT | Mod: 25 | Performed by: FAMILY MEDICINE

## 2019-04-11 PROCEDURE — 17003 DESTRUCT PREMALG LES 2-14: CPT | Performed by: FAMILY MEDICINE

## 2019-04-11 NOTE — PROGRESS NOTES
"[facility] - PRIMARY CARE SKIN    CC: skin cancer screening (full-body)  SUBJECTIVE:   Rommel Bryan is a(n) 64 year old male who presents to clinic today for a full-body skin exam.    Pathology report is still pending for a lump on the right ear which was shaved 4/9/19.    Personal Medical History  Skin cancer: NO  Eczema Psoriasis Autoimmune   NO ?YES on knee when younger NO       Family Medical History  Skin cancer: YES - \"melanoma\" in brother on nose and behind ear  Eczema Psoriasis Autoimmune   NO NO NO     Refer to electronic medical record (EMR) for past medical history and medications.    ROS: 14 point review of systems was negative except the symptoms listed above in the HPI.    This document serves as a record of the services and decisions personally performed and made by Margie Anna MD and was created by Dino Reaves, a trained medical scribe, based on personal observations and provider statements to the medical scribe.  April 11, 2019 1:12 PM   Dino Reaves    OBJECTIVE:   GENERAL: healthy, alert and no distress.  SKIN: [place] Skin Type - III.  This patient was examined from the top of the head to the bottom of the feet  including scalp, face, neck, trunk, buttocks, both arms, both legs, both hands, both feet, and all fingers and toes. The dermatoscope was used to help evaluate pigmented lesions.  Skin Pertinent Findings:  Face: brown, macule(s) most consistent with benign solar lentigo. pedunculated, benign-appearing skin tag(s)/acrochorda on eyelids. raised, flesh-colored, lesion with central depression consistent with sebaceous hyperplasia.    Anterior trunk: Scattered slightly raised, red lesion(s) consistent with capillary hemangioma.    Underneath right breast : Ecchymosis     Left medial foot, arch: corn    Back: slightly raised, red lesion(s) consistent with capillary hemangioma. brown, macule(s) most consistent with benign solar lentigo.    Significant Findings:  Scalp, " face: 3-5 mm in size, erythematous, scaly, non-indurated lesion(s) most consistent with actinic keratoses.  Name: Liquid Nitrogen Cry-Ac Cryotherapy.  Indication: Pre-malignant lesion.  Location(s): scalp - x3, left temple - x3, left forehead - x2, mid-forehead - x2; right lateral forehead - x3; right lateral eyebrow - x1.  Completed by: Beverly Anna MD.  Note : Discussed natural history of lesion and treatment options. Prior to treatment, we discussed inflammation, tenderness post-procedure, the healing process, and the risks of pain, infection, scarring, blistering, and hypo-/hyperpigmentation after healing. Explained that these lesions may grow back and may need additional treatment or re-treatment. The patient expressed a desire to proceed with cryotherapy.    The lesion(s) was treated with liquid nitrogen Cry-Ac, five second freeze repeated twice with a pause to allow for the area to thaw.    Patient tolerated the procedure well and left in good condition. If this lesion should persist or recur, then it needs to be re-evaluated.  Total number of lesions treated: 14.     ASSESSMENT:     Encounter Diagnoses   Name Primary?     AK (actinic keratosis) Yes     Solar lentigo      Acrochordon      Sebaceous hyperplasia of face      Capillary hemangioma of skin      Ecchymosis      Corn or callus          PLAN:   Patient Instructions   FUTURE APPOINTMENTS  Follow up in 3 months for a head and neck skin cancer screening.  Follow up in 1 year(s) for a full-body skin cancer screening.  Follow up per pathology report from the 4/9/19 procedure.    ACTINIC KERATOSES POST-TREATMENT CARE INSTRUCTIONS  Actinic keratoses are benign, scaly or gritty lesions that appear in sun-exposed areas and may progress to skin cancers. For this reason, it is important to treat them before they become cancerous. Liquid nitrogen is the most commonly used and most effective treatment for actinic keratoses; it is mildly uncomfortable when  applied to the skin, but the discomfort rapidly subsides.    Post-Treatment:  You may experience burning and/or stinging immediately following the procedure. The discomfort from the procedure may persist over the next 12-24 hours. The area treated will look pinker and slightly swollen before the healing process begins. You may also notice redness, swelling, tenderness, weeping and crusts or scabs. Healing time is approximately 10-14 days.    Blister - You may or may notice blistering from the freezing. If you develop an uncomfortable blister from today's treatment, you may gently puncture this with a needle that has been cleaned with alcohol. However, do not remove the protective skin layer of the blister.    Scab - After a few days, you may notice scaliness or scab formation. Do not pick at the scabs because this may cause slower healing and a permanent scar.    The skin may appear temporarily darker at the treatment site, but this usually fades over a period of months, provided that the area is protected from the sun.    Care of the areas treated:    Wash the area with a mild cleanser.    Gently pat dry.    Do not rub.     Keep protected from the sun during the healing process and for a full year following treatment as the skin continues to remodel during this time.    Apply Vaseline or Aquaphor ointment sparingly to the site for the first 7 days after treatment.    Do not use Neosporin, as many people eventually develop a medication allergy, that can easily be confused with an infection, to Neomycin.    Return if:  There should not be any residual scaling. If there is any concern that the lesion has persisted after 4-6 weeks, make an appointment for a re-check. Healing time does vary depending on your individual healing process and the area of the body treated. Most patients will be healed in one month.    Signs of Infection:  Thankfully this is rare. However if you notice persistent colored drainage, increasing  "pain, fever or other signs of infection, please call us at: [phone]    SUN PROTECTION INSTRUCTIONS  Sun damage can lead to skin cancer and premature aging of the skin.      The best way to protect from sun damage to your skin is to avoid the sun during peak hours (10 am - 2 pm) even on overcast days.    Never use tanning beds. Tanning beds are associated with much higher risks of skin cancer.    All tanning damages the skin. Aim for ivory skin year round and you will have less trouble with your skin in years to come. There is no merit in getting \"a base tan\" before a warm weather vacation, as any tanning indicates your body's response to sun damage.    Stop smoking. Smokers have higher rates of skin cancer and also have premature skin wrinkling.    Use UPF sun-protective clothing, which while more expensive initially provides longer lasting coverage without having to worry about remembering to re-apply.  1. Wear a wide-brimmed hat and sunglasses.   2. Wear sun-protective clothing.  [employer] and other [employer] make sun protective clothing that are stylish, comfortable and cool.   [employer] and other [employer] make UV arm sleeves suitable for golfing, gardening and other activities.    Sunscreen instructions:  1. Use sunscreens with Zinc Oxide, Titanium Dioxide or Avobenzone to protect from UVA rays.  2. Use SPF 30-50+ to protect from UVB rays.  3. Re-apply every 2 hours even if water resistant.  4. Apply on your face every day even when cloudy and even in the winter. UVA \"aging rays\" penetrate window glass and are just as strong in the winter as in the summer.    FYI  You should use about 3 tablespoons of sunscreen to protect your whole body. Thus a typical eight ounce bottle of sunscreen should last 4 applications. Remember, that the SPF rating is compromised if you don t apply enough. Most people only apply 1/2 - 1/3 of the amount they need. Also don t forget areas such as your ears, feet, " upper back and harder to reach places. Keep in mind that these amounts should be increased for larger body sizes.    Sunscreens with titanium dioxide and/or zinc oxide in the active ingredients are physical blockers as opposed to chemical blockers. Chemical-free sunscreens should not irritate the skin.    Spray-on sunscreens may be used for touch-up application only, not as a base layer. Also, use with caution around small children due to inhalation risk.    SPF means sun protection factor, which is just the degree to which the sunscreen can protect against UVB rays. There is no rating system for UVA rays. SPF is calculated as the time skin will burn when sunscreen is applied vs. skin without sunscreen.    Water resistant sunscreens should be re-applied every 1-2 hours.    Product Recommendations:    Consider use of sunscreen sticks with Zinc Oxide and Titanium Dioxide active ingredients such as Neutrogena Pure&Free Baby Sunscreen Stick.    Good examples include: Blue Lizard, EltaMD, Solbar    Good daily moisturizers with SPF: Vanicream, CeraVe.    For sensitive skin, consider : SkinMedica Essential Defense Mineral Shield Broad Spectrum SPF 35    Men: consider use of Neutrogena Triple Protect Facial Lotion    Avoid retinyl palmitate products.  Avoid combination products that include both sunscreen and insect repellant, as sunscreen should be applied every 2 hours, but insect repellant should not be applied as frequently.    For more information:  https://www.skincancer.org/prevention/sun-protection/sunscreen/sunscreens-safe-and-effective    SKIN CANCER SELF-EXAM INSTRUCTIONS  Check every month in the mirror or with a household member. Be aware of any changes, especially bleeding or tenderness. Also, make sure to check your nails for color changes after removal of nail polish.    For melanoma, check for:  A - Asymmetry. One half unlike the other half.  B - Border. Irregular, scalloped, ragged, notched, blurred or  poorly defined borders.  C - Color. Color variations from one area to another, with shades of tan, brown and/or black present. Sometimes white, red or blue.  D - Diameter. Greater than 6 mm (about the size of a pencil eraser). Any new growth of a mole should be concerning and be evaluated.  E - Evolving. A mole or skin lesion that looks different from the rest or is changing in size, shape or color.    For basal cell carcinoma and squamous cell carcinoma, check for:    Sores, shiny bumps, nodules, scaly lesions, or wart-like growths that are itchy, tender, crusting, scabbing, eroding, oozing or bleeding.    Open sores/wounds or reddish/irritated areas that do not heal within 2-3 weeks.    Scar-like areas that are white, yellow or waxy in color.    CORN OR CALLUS INSTRUCTIONS    Wash the affected area (and optionally, soak in warm water for 5 minutes). Dry thoroughly.    A pumice stone, PedEgg or clean washcloth can be used to gently remove the softened layers of the corn.    You may also consider use of OTC (over-the-counter) Kerasal Intensive Foot Repair    Apply a moisturizer regularly such as with a healing balm. Wear a sock after application of moisturizer.      The patient was counseled about sunscreens and sun avoidance. The patient was counseled to check the skin regularly and report any lesion that is new, changing, itching, scabbing, bleeding or otherwise bothersome. The patient was discharged ambulatory and in stable condition.    TT: 25 minutes.  CT: 15 minutes.    The information in this document, created by the medical scribe for me, accurately reflects the services I personally performed and the decisions made by me. I have reviewed and approved this document for accuracy prior to leaving the patient care area.  April 11, 2019 1:12 PM  Margie Anna MD  [facility] [place]     5

## 2019-04-11 NOTE — LETTER
"    4/11/2019         RE: Rommel Bryan  [address] [id number]        Dear Colleague,    Thank you for referring your patient, Rommel Bryan, to the [facility] [place]. Please see a copy of my visit note below.    [facility] - PRIMARY CARE SKIN    CC: skin cancer screening (full-body)  SUBJECTIVE:   Rommel Bryan is a(n) 64 year old male who presents to clinic today for a full-body skin exam.    Pathology report is still pending for a lump on the right ear which was shaved 4/9/19.    Personal Medical History  Skin cancer: NO  Eczema Psoriasis Autoimmune   NO ?YES on knee when younger NO       Family Medical History  Skin cancer: YES - \"melanoma\" in brother on nose and behind ear  Eczema Psoriasis Autoimmune   NO NO NO     Refer to electronic medical record (EMR) for past medical history and medications.    ROS: 14 point review of systems was negative except the symptoms listed above in the HPI.    This document serves as a record of the services and decisions personally performed and made by Margie Anna MD and was created by Dino Reaves, a trained medical scribe, based on personal observations and provider statements to the medical scribe.  April 11, 2019 1:12 PM   Dino Reaves    OBJECTIVE:   GENERAL: healthy, alert and no distress.  SKIN: [place] Skin Type - III.  This patient was examined from the top of the head to the bottom of the feet  including scalp, face, neck, trunk, buttocks, both arms, both legs, both hands, both feet, and all fingers and toes. The dermatoscope was used to help evaluate pigmented lesions.  Skin Pertinent Findings:  Face: brown, macule(s) most consistent with benign solar lentigo. pedunculated, benign-appearing skin tag(s)/acrochorda on eyelids. raised, flesh-colored, lesion with central depression consistent with sebaceous hyperplasia.    Anterior trunk: Scattered slightly raised, red lesion(s) consistent with " capillary hemangioma.    Underneath right breast : Ecchymosis     Left medial foot, arch: corn    Back: slightly raised, red lesion(s) consistent with capillary hemangioma. brown, macule(s) most consistent with benign solar lentigo.    Significant Findings:  Scalp, face: 3-5 mm in size, erythematous, scaly, non-indurated lesion(s) most consistent with actinic keratoses.  Name: Liquid Nitrogen Cry-Ac Cryotherapy.  Indication: Pre-malignant lesion.  Location(s): scalp - x3, left temple - x3, left forehead - x2, mid-forehead - x2; right lateral forehead - x3; right lateral eyebrow - x1.  Completed by: Beverly Anna MD.  Note : Discussed natural history of lesion and treatment options. Prior to treatment, we discussed inflammation, tenderness post-procedure, the healing process, and the risks of pain, infection, scarring, blistering, and hypo-/hyperpigmentation after healing. Explained that these lesions may grow back and may need additional treatment or re-treatment. The patient expressed a desire to proceed with cryotherapy.    The lesion(s) was treated with liquid nitrogen Cry-Ac, five second freeze repeated twice with a pause to allow for the area to thaw.    Patient tolerated the procedure well and left in good condition. If this lesion should persist or recur, then it needs to be re-evaluated.  Total number of lesions treated: 14.     ASSESSMENT:     Encounter Diagnoses   Name Primary?     AK (actinic keratosis) Yes     Solar lentigo      Acrochordon      Sebaceous hyperplasia of face      Capillary hemangioma of skin      Ecchymosis      Corn or callus          PLAN:   Patient Instructions   FUTURE APPOINTMENTS  Follow up in 3 months for a head and neck skin cancer screening.  Follow up in 1 year(s) for a full-body skin cancer screening.  Follow up per pathology report from the 4/9/19 procedure.    ACTINIC KERATOSES POST-TREATMENT CARE INSTRUCTIONS  Actinic keratoses are benign, scaly or gritty lesions that  appear in sun-exposed areas and may progress to skin cancers. For this reason, it is important to treat them before they become cancerous. Liquid nitrogen is the most commonly used and most effective treatment for actinic keratoses; it is mildly uncomfortable when applied to the skin, but the discomfort rapidly subsides.    Post-Treatment:  You may experience burning and/or stinging immediately following the procedure. The discomfort from the procedure may persist over the next 12-24 hours. The area treated will look pinker and slightly swollen before the healing process begins. You may also notice redness, swelling, tenderness, weeping and crusts or scabs. Healing time is approximately 10-14 days.    Blister - You may or may notice blistering from the freezing. If you develop an uncomfortable blister from today's treatment, you may gently puncture this with a needle that has been cleaned with alcohol. However, do not remove the protective skin layer of the blister.    Scab - After a few days, you may notice scaliness or scab formation. Do not pick at the scabs because this may cause slower healing and a permanent scar.    The skin may appear temporarily darker at the treatment site, but this usually fades over a period of months, provided that the area is protected from the sun.    Care of the areas treated:    Wash the area with a mild cleanser.    Gently pat dry.    Do not rub.     Keep protected from the sun during the healing process and for a full year following treatment as the skin continues to remodel during this time.    Apply Vaseline or Aquaphor ointment sparingly to the site for the first 7 days after treatment.    Do not use Neosporin, as many people eventually develop a medication allergy, that can easily be confused with an infection, to Neomycin.    Return if:  There should not be any residual scaling. If there is any concern that the lesion has persisted after 4-6 weeks, make an appointment for a  "re-check. Healing time does vary depending on your individual healing process and the area of the body treated. Most patients will be healed in one month.    Signs of Infection:  Thankfully this is rare. However if you notice persistent colored drainage, increasing pain, fever or other signs of infection, please call us at: [phone]    SUN PROTECTION INSTRUCTIONS  Sun damage can lead to skin cancer and premature aging of the skin.      The best way to protect from sun damage to your skin is to avoid the sun during peak hours (10 am - 2 pm) even on overcast days.    Never use tanning beds. Tanning beds are associated with much higher risks of skin cancer.    All tanning damages the skin. Aim for ivory skin year round and you will have less trouble with your skin in years to come. There is no merit in getting \"a base tan\" before a warm weather vacation, as any tanning indicates your body's response to sun damage.    Stop smoking. Smokers have higher rates of skin cancer and also have premature skin wrinkling.    Use UPF sun-protective clothing, which while more expensive initially provides longer lasting coverage without having to worry about remembering to re-apply.  1. Wear a wide-brimmed hat and sunglasses.   2. Wear sun-protective clothing.  [employer] and other [employer] make sun protective clothing that are stylish, comfortable and cool.   [employer] and other [employer] make UV arm sleeves suitable for golfing, gardening and other activities.    Sunscreen instructions:  1. Use sunscreens with Zinc Oxide, Titanium Dioxide or Avobenzone to protect from UVA rays.  2. Use SPF 30-50+ to protect from UVB rays.  3. Re-apply every 2 hours even if water resistant.  4. Apply on your face every day even when cloudy and even in the winter. UVA \"aging rays\" penetrate window glass and are just as strong in the winter as in the summer.    FYI  You should use about 3 tablespoons of sunscreen to protect your " whole body. Thus a typical eight ounce bottle of sunscreen should last 4 applications. Remember, that the SPF rating is compromised if you don t apply enough. Most people only apply 1/2 - 1/3 of the amount they need. Also don t forget areas such as your ears, feet, upper back and harder to reach places. Keep in mind that these amounts should be increased for larger body sizes.    Sunscreens with titanium dioxide and/or zinc oxide in the active ingredients are physical blockers as opposed to chemical blockers. Chemical-free sunscreens should not irritate the skin.    Spray-on sunscreens may be used for touch-up application only, not as a base layer. Also, use with caution around small children due to inhalation risk.    SPF means sun protection factor, which is just the degree to which the sunscreen can protect against UVB rays. There is no rating system for UVA rays. SPF is calculated as the time skin will burn when sunscreen is applied vs. skin without sunscreen.    Water resistant sunscreens should be re-applied every 1-2 hours.    Product Recommendations:    Consider use of sunscreen sticks with Zinc Oxide and Titanium Dioxide active ingredients such as Neutrogena Pure&Free Baby Sunscreen Stick.    Good examples include: Blue Lizard, EltaMD, Solbar    Good daily moisturizers with SPF: Vanicream, CeraVe.    For sensitive skin, consider : SkinMedica Essential Defense Mineral Shield Broad Spectrum SPF 35    Men: consider use of Neutrogena Triple Protect Facial Lotion    Avoid retinyl palmitate products.  Avoid combination products that include both sunscreen and insect repellant, as sunscreen should be applied every 2 hours, but insect repellant should not be applied as frequently.    For more information:  https://www.skincancer.org/prevention/sun-protection/sunscreen/sunscreens-safe-and-effective    SKIN CANCER SELF-EXAM INSTRUCTIONS  Check every month in the mirror or with a household member. Be aware of any  changes, especially bleeding or tenderness. Also, make sure to check your nails for color changes after removal of nail polish.    For melanoma, check for:  A - Asymmetry. One half unlike the other half.  B - Border. Irregular, scalloped, ragged, notched, blurred or poorly defined borders.  C - Color. Color variations from one area to another, with shades of tan, brown and/or black present. Sometimes white, red or blue.  D - Diameter. Greater than 6 mm (about the size of a pencil eraser). Any new growth of a mole should be concerning and be evaluated.  E - Evolving. A mole or skin lesion that looks different from the rest or is changing in size, shape or color.    For basal cell carcinoma and squamous cell carcinoma, check for:    Sores, shiny bumps, nodules, scaly lesions, or wart-like growths that are itchy, tender, crusting, scabbing, eroding, oozing or bleeding.    Open sores/wounds or reddish/irritated areas that do not heal within 2-3 weeks.    Scar-like areas that are white, yellow or waxy in color.    CORN OR CALLUS INSTRUCTIONS    Wash the affected area (and optionally, soak in warm water for 5 minutes). Dry thoroughly.    A pumice stone, PedEgg or clean washcloth can be used to gently remove the softened layers of the corn.    You may also consider use of OTC (over-the-counter) Kerasal Intensive Foot Repair    Apply a moisturizer regularly such as with a healing balm. Wear a sock after application of moisturizer.      The patient was counseled about sunscreens and sun avoidance. The patient was counseled to check the skin regularly and report any lesion that is new, changing, itching, scabbing, bleeding or otherwise bothersome. The patient was discharged ambulatory and in stable condition.    TT: 25 minutes.  CT: 15 minutes.    The information in this document, created by the medical scribe for me, accurately reflects the services I personally performed and the decisions made by me. I have reviewed and  approved this document for accuracy prior to leaving the patient care area.  April 11, 2019 1:12 PM  Margie Anna MD  [facility] [place]      Again, thank you for allowing me to participate in the care of your patient.        Sincerely,        Margie Anna MD

## 2019-04-11 NOTE — PATIENT INSTRUCTIONS
FUTURE APPOINTMENTS    Follow up in 3 months for a head and neck skin cancer screening.    Follow up in 1 year(s) for a full-body skin cancer screening.    Follow up per pathology report from the 4/9/19 procedure.    ACTINIC KERATOSES POST-TREATMENT CARE INSTRUCTIONS  Actinic keratoses are benign, scaly or gritty lesions that appear in sun-exposed areas and may progress to skin cancers. For this reason, it is important to treat them before they become cancerous. Liquid nitrogen is the most commonly used and most effective treatment for actinic keratoses; it is mildly uncomfortable when applied to the skin, but the discomfort rapidly subsides.    Post-Treatment:  You may experience burning and/or stinging immediately following the procedure. The discomfort from the procedure may persist over the next 12-24 hours. The area treated will look pinker and slightly swollen before the healing process begins. You may also notice redness, swelling, tenderness, weeping and crusts or scabs. Healing time is approximately 10-14 days.    Blister - You may or may notice blistering from the freezing. If you develop an uncomfortable blister from today's treatment, you may gently puncture this with a needle that has been cleaned with alcohol. However, do not remove the protective skin layer of the blister.    Scab - After a few days, you may notice scaliness or scab formation. Do not pick at the scabs because this may cause slower healing and a permanent scar.    The skin may appear temporarily darker at the treatment site, but this usually fades over a period of months, provided that the area is protected from the sun.    Care of the areas treated:    Wash the area with a mild cleanser.    Gently pat dry.    Do not rub.     Keep protected from the sun during the healing process and for a full year following treatment as the skin continues to remodel during this time.    Apply Vaseline or Aquaphor ointment sparingly to the site for  "the first 7 days after treatment.    Do not use Neosporin, as many people eventually develop a medication allergy, that can easily be confused with an infection, to Neomycin.    Return if:  There should not be any residual scaling. If there is any concern that the lesion has persisted after 4-6 weeks, make an appointment for a re-check. Healing time does vary depending on your individual healing process and the area of the body treated. Most patients will be healed in one month.    Signs of Infection:  Thankfully this is rare. However if you notice persistent colored drainage, increasing pain, fever or other signs of infection, please call us at: [phone]    SUN PROTECTION INSTRUCTIONS  Sun damage can lead to skin cancer and premature aging of the skin.      The best way to protect from sun damage to your skin is to avoid the sun during peak hours (10 am - 2 pm) even on overcast days.    Never use tanning beds. Tanning beds are associated with much higher risks of skin cancer.    All tanning damages the skin. Aim for ivory skin year round and you will have less trouble with your skin in years to come. There is no merit in getting \"a base tan\" before a warm weather vacation, as any tanning indicates your body's response to sun damage.    Stop smoking. Smokers have higher rates of skin cancer and also have premature skin wrinkling.    Use UPF sun-protective clothing, which while more expensive initially provides longer lasting coverage without having to worry about remembering to re-apply.  1. Wear a wide-brimmed hat and sunglasses.   2. Wear sun-protective clothing.  [employer] and other [employer] make sun protective clothing that are stylish, comfortable and cool.   [employer] and other [employer] make UV arm sleeves suitable for golfing, gardening and other activities.    Sunscreen instructions:  1. Use sunscreens with Zinc Oxide, Titanium Dioxide or Avobenzone to protect from UVA rays.  2. Use SPF " "30-50+ to protect from UVB rays.  3. Re-apply every 2 hours even if water resistant.  4. Apply on your face every day even when cloudy and even in the winter. UVA \"aging rays\" penetrate window glass and are just as strong in the winter as in the summer.    FYI  You should use about 3 tablespoons of sunscreen to protect your whole body. Thus a typical eight ounce bottle of sunscreen should last 4 applications. Remember, that the SPF rating is compromised if you don t apply enough. Most people only apply 1/2 - 1/3 of the amount they need. Also don t forget areas such as your ears, feet, upper back and harder to reach places. Keep in mind that these amounts should be increased for larger body sizes.    Sunscreens with titanium dioxide and/or zinc oxide in the active ingredients are physical blockers as opposed to chemical blockers. Chemical-free sunscreens should not irritate the skin.    Spray-on sunscreens may be used for touch-up application only, not as a base layer. Also, use with caution around small children due to inhalation risk.    SPF means sun protection factor, which is just the degree to which the sunscreen can protect against UVB rays. There is no rating system for UVA rays. SPF is calculated as the time skin will burn when sunscreen is applied vs. skin without sunscreen.    Water resistant sunscreens should be re-applied every 1-2 hours.    Product Recommendations:    Consider use of sunscreen sticks with Zinc Oxide and Titanium Dioxide active ingredients such as Neutrogena Pure&Free Baby Sunscreen Stick.    Good examples include: Blue Devante, EltaMD, Solbar    Good daily moisturizers with SPF: Vanicream, CeraVe.    For sensitive skin, consider : SkinMedica Essential Defense Mineral Shield Broad Spectrum SPF 35    Men: consider use of Neutrogena Triple Protect Facial Lotion    Avoid retinyl palmitate products.  Avoid combination products that include both sunscreen and insect repellant, as sunscreen " should be applied every 2 hours, but insect repellant should not be applied as frequently.    For more information:  https://www.skincancer.org/prevention/sun-protection/sunscreen/sunscreens-safe-and-effective    SKIN CANCER SELF-EXAM INSTRUCTIONS  Check every month in the mirror or with a household member. Be aware of any changes, especially bleeding or tenderness. Also, make sure to check your nails for color changes after removal of nail polish.    For melanoma, check for:  A - Asymmetry. One half unlike the other half.  B - Border. Irregular, scalloped, ragged, notched, blurred or poorly defined borders.  C - Color. Color variations from one area to another, with shades of tan, brown and/or black present. Sometimes white, red or blue.  D - Diameter. Greater than 6 mm (about the size of a pencil eraser). Any new growth of a mole should be concerning and be evaluated.  E - Evolving. A mole or skin lesion that looks different from the rest or is changing in size, shape or color.    For basal cell carcinoma and squamous cell carcinoma, check for:    Sores, shiny bumps, nodules, scaly lesions, or wart-like growths that are itchy, tender, crusting, scabbing, eroding, oozing or bleeding.    Open sores/wounds or reddish/irritated areas that do not heal within 2-3 weeks.    Scar-like areas that are white, yellow or waxy in color.    CORN OR CALLUS INSTRUCTIONS    Wash the affected area (and optionally, soak in warm water for 5 minutes). Dry thoroughly.    A pumice stone, PedEgg or clean washcloth can be used to gently remove the softened layers of the corn.    You may also consider use of OTC (over-the-counter) Kerasal Intensive Foot Repair    Apply a moisturizer regularly such as with a healing balm. Wear a sock after application of moisturizer.

## 2019-04-12 LAB — COPATH REPORT: NORMAL

## 2019-04-15 ENCOUNTER — TELEPHONE (OUTPATIENT)
Dept: FAMILY MEDICINE | Facility: CLINIC | Age: 64
End: 2019-04-15

## 2019-04-15 DIAGNOSIS — E11.9 TYPE 2 DIABETES MELLITUS WITHOUT COMPLICATION, WITHOUT LONG-TERM CURRENT USE OF INSULIN (H): ICD-10-CM

## 2019-04-15 NOTE — LETTER
[facility]  [address]  [id number] [phone]    4/15/2019       Rommel Bryan  [facility] [facility] [address]      Dear Rommel:    You are scheduled for Mohs Surgery on: 5/9/19 @7:00am.    Please check in at 3rd [facility], [address].     You don't need to arrive more than 5-10 minutes prior to your appointment time.     Be sure to eat a good breakfast and bathe and wash your hair prior to surgery.     If you are taking any anti-coagulants that are prescribed by your Doctor (such as Coumadin/Warfarin, Plavix, Aspirin, Ibuprofen), please continue taking them.     However, if you are taking anti-coagulants over the counter without a Doctor's order for a medical condition, please discontinue them 10 days prior to surgery.     Please wear loose comfortable clothing as it could possibly be 4-6 hours until your surgery is completed depending upon how many layers of tissue need to be removed.      Thank you,    BAKARI Moon MD

## 2019-04-15 NOTE — TELEPHONE ENCOUNTER
Encounter :  Voice mail , Explained Mohs procedure over the phone and at the office visit.     Referral to Dr. Moon for Mohs procedure    Skin, right ear:   - Squamous cell carcinoma, well differentiated, extending to the deep   margin - (see description)     Thank you,   Margie Anna M.D.

## 2019-04-15 NOTE — TELEPHONE ENCOUNTER
Called and spoke to patient. Patient notified of Dx by provider (Dr. Anna)- SCC. Educated patient on SCC, mohs, scheduled mohs, and letter/packet sent. Patient voiced understanding.    Moo RN-BSN  [facility]  [phone]

## 2019-04-15 NOTE — TELEPHONE ENCOUNTER
"Pending Prescriptions:                       Disp   Refills    ACCU-CHEK GUIDE test strip [Pharmacy Med *200 ea*3            Sig: USE TO TEST BLOOD SUGARS TWO TIMES A DAY OR AS           DIRECTED    Last Written Prescription Date:  1/23/18  Last Fill Quantity: 200,  # refills: 3   Last office visit: 4/11/2019 with prescribing provider:     Future Office Visit:   Next 5 appointments (look out 90 days)    Apr 16, 2019  8:00 AM CDT  Office Visit with Kieran Tomlinson MD  [facility] ([facility] [address] [facility] [address]  [phone]   Jul 11, 2019  4:00 PM CDT  Return Visit with Margie Anna MD  [facility] ([address] [address]  [phone]         Requested Prescriptions   Pending Prescriptions Disp Refills     ACCU-CHEK GUIDE test strip [Pharmacy Med Name: ACCU-CHEK GUIDE  STRP] 200 each 3     Sig: USE TO TEST BLOOD SUGARS TWO TIMES A DAY OR AS DIRECTED       Diabetic Supplies Protocol Passed - 4/15/2019 10:25 AM        Passed - Medication is active on med list        Passed - Patient is 18 years of age or older        Passed - Recent (6 mo) or future (30 days) visit within the authorizing provider's specialty     Patient had office visit in the last 6 months or has a visit in the next 30 days with authorizing provider.  See \"Patient Info\" tab in inbasket, or \"Choose Columns\" in Meds & Orders section of the refill encounter.              "

## 2019-04-15 NOTE — TELEPHONE ENCOUNTER
Notes recorded by Margie Anna MD on 4/15/2019 at 12:08 PM CDT  Encounter :  Voice mail , Explained Mohs procedure over the phone and at the office visit.     Referral to Dr. Moon for Mohs procedure    Skin, right ear:   - Squamous cell carcinoma, well differentiated, extending to the deep   margin - (see description)     Thank you,   Margie Anna M.D

## 2019-04-16 ENCOUNTER — OFFICE VISIT (OUTPATIENT)
Dept: FAMILY MEDICINE | Facility: CLINIC | Age: 64
End: 2019-04-16
Payer: COMMERCIAL

## 2019-04-16 VITALS
DIASTOLIC BLOOD PRESSURE: 72 MMHG | HEIGHT: 69 IN | WEIGHT: 264 LBS | TEMPERATURE: 96.5 F | SYSTOLIC BLOOD PRESSURE: 118 MMHG | HEART RATE: 67 BPM | BODY MASS INDEX: 39.1 KG/M2 | OXYGEN SATURATION: 98 %

## 2019-04-16 DIAGNOSIS — Z00.00 ROUTINE GENERAL MEDICAL EXAMINATION AT A HEALTH CARE FACILITY: ICD-10-CM

## 2019-04-16 DIAGNOSIS — I10 ESSENTIAL HYPERTENSION: ICD-10-CM

## 2019-04-16 DIAGNOSIS — Z13.29 SCREENING FOR HYPOTHYROIDISM: ICD-10-CM

## 2019-04-16 DIAGNOSIS — E55.9 VITAMIN D DEFICIENCY: ICD-10-CM

## 2019-04-16 DIAGNOSIS — E11.9 TYPE 2 DIABETES MELLITUS WITHOUT COMPLICATION, WITHOUT LONG-TERM CURRENT USE OF INSULIN (H): Primary | ICD-10-CM

## 2019-04-16 DIAGNOSIS — Z12.5 SCREENING FOR PROSTATE CANCER: ICD-10-CM

## 2019-04-16 DIAGNOSIS — E66.01 OBESITY, CLASS III, BMI 40-49.9 (MORBID OBESITY) (H): ICD-10-CM

## 2019-04-16 DIAGNOSIS — J45.40 MODERATE PERSISTENT ASTHMA WITHOUT COMPLICATION: ICD-10-CM

## 2019-04-16 DIAGNOSIS — E78.5 HYPERLIPIDEMIA LDL GOAL <130: ICD-10-CM

## 2019-04-16 DIAGNOSIS — R74.01 TRANSAMINASEMIA: ICD-10-CM

## 2019-04-16 LAB
ALBUMIN SERPL-MCNC: 3.9 G/DL (ref 3.4–5)
ALP SERPL-CCNC: 73 U/L (ref 40–150)
ALT SERPL W P-5'-P-CCNC: 27 U/L (ref 0–70)
ANION GAP SERPL CALCULATED.3IONS-SCNC: 7 MMOL/L (ref 3–14)
AST SERPL W P-5'-P-CCNC: 15 U/L (ref 0–45)
BILIRUB SERPL-MCNC: 0.6 MG/DL (ref 0.2–1.3)
BUN SERPL-MCNC: 14 MG/DL (ref 7–30)
CALCIUM SERPL-MCNC: 9.7 MG/DL (ref 8.5–10.1)
CHLORIDE SERPL-SCNC: 106 MMOL/L (ref 94–109)
CHOLEST SERPL-MCNC: 170 MG/DL
CO2 SERPL-SCNC: 28 MMOL/L (ref 20–32)
CREAT SERPL-MCNC: 0.62 MG/DL (ref 0.66–1.25)
CREAT UR-MCNC: 54 MG/DL
DEPRECATED CALCIDIOL+CALCIFEROL SERPL-MC: 18 UG/L (ref 20–75)
ERYTHROCYTE [DISTWIDTH] IN BLOOD BY AUTOMATED COUNT: 13.6 % (ref 10–15)
FEF 25/75: NORMAL
FEV-1: NORMAL
FEV1/FVC: NORMAL
FVC: NORMAL
GFR SERPL CREATININE-BSD FRML MDRD: >90 ML/MIN/{1.73_M2}
GLUCOSE SERPL-MCNC: 91 MG/DL (ref 70–99)
HBA1C MFR BLD: 6.1 % (ref 0–5.6)
HCT VFR BLD AUTO: 44.8 % (ref 40–53)
HDLC SERPL-MCNC: 43 MG/DL
HGB BLD-MCNC: 15.1 G/DL (ref 13.3–17.7)
LDLC SERPL CALC-MCNC: 91 MG/DL
MCH RBC QN AUTO: 28.4 PG (ref 26.5–33)
MCHC RBC AUTO-ENTMCNC: 33.7 G/DL (ref 31.5–36.5)
MCV RBC AUTO: 84 FL (ref 78–100)
MICROALBUMIN UR-MCNC: 7 MG/L
MICROALBUMIN/CREAT UR: 12.7 MG/G CR (ref 0–17)
NONHDLC SERPL-MCNC: 127 MG/DL
PLATELET # BLD AUTO: 211 10E9/L (ref 150–450)
POTASSIUM SERPL-SCNC: 4.1 MMOL/L (ref 3.4–5.3)
PROT SERPL-MCNC: 7.8 G/DL (ref 6.8–8.8)
PSA SERPL-ACNC: 0.91 UG/L (ref 0–4)
RBC # BLD AUTO: 5.32 10E12/L (ref 4.4–5.9)
SODIUM SERPL-SCNC: 141 MMOL/L (ref 133–144)
TRIGL SERPL-MCNC: 178 MG/DL
TSH SERPL DL<=0.005 MIU/L-ACNC: 2.13 MU/L (ref 0.4–4)
WBC # BLD AUTO: 9.1 10E9/L (ref 4–11)

## 2019-04-16 PROCEDURE — 36415 COLL VENOUS BLD VENIPUNCTURE: CPT | Performed by: INTERNAL MEDICINE

## 2019-04-16 PROCEDURE — 82043 UR ALBUMIN QUANTITATIVE: CPT | Performed by: INTERNAL MEDICINE

## 2019-04-16 PROCEDURE — 80061 LIPID PANEL: CPT | Performed by: INTERNAL MEDICINE

## 2019-04-16 PROCEDURE — 80053 COMPREHEN METABOLIC PANEL: CPT | Performed by: INTERNAL MEDICINE

## 2019-04-16 PROCEDURE — 99396 PREV VISIT EST AGE 40-64: CPT | Mod: 25 | Performed by: INTERNAL MEDICINE

## 2019-04-16 PROCEDURE — 84443 ASSAY THYROID STIM HORMONE: CPT | Performed by: INTERNAL MEDICINE

## 2019-04-16 PROCEDURE — 85027 COMPLETE CBC AUTOMATED: CPT | Performed by: INTERNAL MEDICINE

## 2019-04-16 PROCEDURE — 2894A HC SPIROMETRY, BREATH CAPACITY: CPT | Performed by: INTERNAL MEDICINE

## 2019-04-16 PROCEDURE — 82306 VITAMIN D 25 HYDROXY: CPT | Performed by: INTERNAL MEDICINE

## 2019-04-16 PROCEDURE — 83036 HEMOGLOBIN GLYCOSYLATED A1C: CPT | Performed by: INTERNAL MEDICINE

## 2019-04-16 PROCEDURE — 99207 C FOOT EXAM  NO CHARGE: CPT | Mod: 25 | Performed by: INTERNAL MEDICINE

## 2019-04-16 PROCEDURE — G0103 PSA SCREENING: HCPCS | Performed by: INTERNAL MEDICINE

## 2019-04-16 RX ORDER — ATORVASTATIN CALCIUM 40 MG/1
40 TABLET, FILM COATED ORAL DAILY
Qty: 90 TABLET | Refills: 3 | Status: SHIPPED | OUTPATIENT
Start: 2019-04-16 | End: 2020-01-14

## 2019-04-16 RX ORDER — GLIPIZIDE 5 MG/1
TABLET ORAL
Qty: 90 TABLET | Refills: 3 | Status: SHIPPED | OUTPATIENT
Start: 2019-04-16 | End: 2020-01-14

## 2019-04-16 RX ORDER — LISINOPRIL 5 MG/1
5 TABLET ORAL DAILY
Qty: 30 TABLET | Refills: 0 | Status: SHIPPED | OUTPATIENT
Start: 2019-04-16 | End: 2019-05-22

## 2019-04-16 ASSESSMENT — MIFFLIN-ST. JEOR: SCORE: 1969.94

## 2019-04-16 NOTE — TELEPHONE ENCOUNTER
"Last Written Prescription Date:  1/23/18  Last Fill Quantity: 30 tablet,  # refills: 1   Last office visit: 2/27/2018 with prescribing provider:  Davi   Future Office Visit:      Requested Prescriptions   Pending Prescriptions Disp Refills     atorvastatin (LIPITOR) 40 MG tablet [Pharmacy Med Name: ATORVASTATIN CALCIUM 40MG TABS] 30 tablet 1     Sig: TAKE ONE TABLET BY MOUTH ONCE DAILY    Statins Protocol Passed    3/22/2018  8:52 AM       Passed - LDL on file in past 12 months    Recent Labs   Lab Test  02/27/18   0816   LDL  67            Passed - No abnormal creatine kinase in past 12 months    No lab results found.            Passed - Recent (12 mo) or future (30 days) visit within the authorizing provider's specialty    Patient had office visit in the last 12 months or has a visit in the next 30 days with authorizing provider or within the authorizing provider's specialty.  See \"Patient Info\" tab in inbasket, or \"Choose Columns\" in Meds & Orders section of the refill encounter.           Passed - Patient is age 18 or older          " No

## 2019-04-16 NOTE — PROGRESS NOTES
SUBJECTIVE:   Rommel Bryan is a 64 year old male who presents to clinic today for the following   health issues:        Medication Followup     Derm  Seen for right ear lesion and scheduled for Mohs surgery    Adult onset diabetes  States that his blood sugars are in range and checks them as needed  Feet-denies any problems including ulcers or neuropathy  Ophthalmology-q. 6 months    COPD  Uses Flovent as needed in the spring and fall.  States that he never uses a rescue inhaler or has any other complications  Additional history: as documented    Reviewed  and updated as needed this visit by clinical staff         Reviewed and updated as needed this visit by Provider         Current Outpatient Medications   Medication Sig Dispense Refill     albuterol (PROAIR HFA, PROVENTIL HFA, VENTOLIN HFA) 108 (90 BASE) MCG/ACT inhaler Inhale 2 puffs into the lungs every 6 hours as needed for shortness of breath / dyspnea 1 Inhaler 1     aspirin 81 MG tablet Take 1 tablet by mouth daily.       atorvastatin (LIPITOR) 40 MG tablet Take 1 tablet (40 mg) by mouth daily 90 tablet 3     blood glucose (NO BRAND SPECIFIED) test strip Use to test blood sugars bid times daily or as directed 200 strip 3     blood glucose monitoring (NO BRAND SPECIFIED) meter device kit Use to test blood sugar bidtimes daily or as directed. 1 kit 3     fluticasone (FLOVENT DISKUS) 100 MCG/BLIST inhaler Inhale 1 puff into the lungs 2 times daily 3 Inhaler 1     glipiZIDE (GLUCOTROL) 5 MG tablet TAKE ONE TABLET BY MOUTH EVERY MORNING BEFORE BREAKFAST 90 tablet 3     lisinopril (PRINIVIL/ZESTRIL) 5 MG tablet Take 1 tablet (5 mg) by mouth daily 30 tablet 0     metFORMIN (GLUCOPHAGE) 500 MG tablet Take 1 tablet (500 mg) by mouth daily (with dinner) 60 tablet 3     order for DME Equipment being ordered: CPAP mask only 1 each 0     order for DME Equipment being ordered: CPAP mask   full face mask (CPT code: a7030) and head gear (CPT code: ). 1 each  "0     Allergies   Allergen Reactions     No Known Allergies        ROS:  Constitutional, HEENT, cardiovascular-no symptoms of angina or heart failure although he has a relatively low activity level pulmonary, GI, -nocturia x1 musculoskeletal-low back pain and stiffness every morning relieved within half an hour neuro, skin, endocrine and psych systems are negative, except as otherwise noted.    OBJECTIVE:     /72 (BP Location: Left arm, Patient Position: Sitting, Cuff Size: Adult Large)   Pulse 67   Temp 96.5  F (35.8  C) (Oral)   Ht 1.74 m (5' 8.5\")   Wt 119.7 kg (264 lb)   SpO2 98%   BMI 39.56 kg/m    Body mass index is 39.56 kg/m .  GENERAL: Morbidly obese white male, alert and no distress  NECK: no adenopathy, no asymmetry, masses, or scars and thyroid normal to palpation  RESP: lungs clear to auscultation - no rales, rhonchi or wheezes  CV: regular rate and rhythm, normal S1 S2, no S3 or S4, no murmur, click or rub, no peripheral edema and peripheral pulses strong  ABDOMEN: Obese, soft, nontender, no hepatosplenomegaly, no masses and bowel sounds normal  MS: no gross musculoskeletal defects noted, no edema  Feet: Clean and dry      ASSESSMENT/PLAN:             1. Type 2 diabetes mellitus without complication, without long-term current use of insulin (H)  Reviewed diet and activity and requested that he check his blood sugars in the morning.  Start lisinopril and asked him to return to clinic in 2 to 4 weeks  - TSH with free T4 reflex  - Hemoglobin A1c  - Albumin Random Urine Quantitative with Creat Ratio  - C FOOT EXAM  NO CHARGE  - blood glucose (NO BRAND SPECIFIED) test strip; Use to test blood sugars bid times daily or as directed  Dispense: 200 strip; Refill: 3  - lisinopril (PRINIVIL/ZESTRIL) 5 MG tablet; Take 1 tablet (5 mg) by mouth daily  Dispense: 30 tablet; Refill: 0  - metFORMIN (GLUCOPHAGE) 500 MG tablet; Take 1 tablet (500 mg) by mouth daily (with dinner)  Dispense: 60 tablet; Refill: " 3  - glipiZIDE (GLUCOTROL) 5 MG tablet; TAKE ONE TABLET BY MOUTH EVERY MORNING BEFORE BREAKFAST  Dispense: 90 tablet; Refill: 3    2. Obesity, Class III, BMI 40-49.9 (morbid obesity) (H)      3. Transaminasemia    - Comprehensive metabolic panel    4. Hyperlipidemia LDL goal <130    - Lipid panel reflex to direct LDL Fasting  - atorvastatin (LIPITOR) 40 MG tablet; Take 1 tablet (40 mg) by mouth daily  Dispense: 90 tablet; Refill: 3    5. Moderate persistent asthma without complication    - Spirometry, Breathing Capacity: Normal Order, Clinic Performed  - fluticasone (FLOVENT DISKUS) 100 MCG/BLIST inhaler; Inhale 1 puff into the lungs 2 times daily  Dispense: 3 Inhaler; Refill: 1    6. Essential hypertension  -Well-controlled with current routine.  Started on lisinopril for his diabetes and advised of ongoing potential side effects and initiated on a very low dose to avoid hypotension  - CBC with platelets  - Comprehensive metabolic panel    7. Routine general medical examination at a health care facility    - CBC with platelets  - Comprehensive metabolic panel  - Lipid panel reflex to direct LDL Fasting  - Prostate spec antigen screen  - TSH with free T4 reflex  - Vitamin D Deficiency  - Hemoglobin A1c  - Albumin Random Urine Quantitative with Creat Ratio  - C FOOT EXAM  NO CHARGE    8. Screening for prostate cancer    - Prostate spec antigen screen    9. Vitamin D deficiency    - Vitamin D Deficiency    10. Screening for hypothyroidism    - TSH with free T4 reflex        Kieran Tomlinson MD  [facility]

## 2019-04-17 RX ORDER — BLOOD SUGAR DIAGNOSTIC
STRIP MISCELLANEOUS
Start: 2019-04-17

## 2019-04-25 ENCOUNTER — TELEPHONE (OUTPATIENT)
Dept: DERMATOLOGY | Facility: CLINIC | Age: 64
End: 2019-04-25

## 2019-04-25 NOTE — LETTER
Understanding Mohs Surgery    Mohs surgery is done to help treat skin cancer. During the surgery, a thin section of the tumor and a small area around it are removed. The removed tissue is looked at right away under a microscope. The process is then repeated. Sections of tissue continue to be removed and looked at under a microscope until no more cancer cells are found.  During standard tumor removal, the tumor and a margin of healthy tissue around it are removed all at once. Mohs surgery removes the tumor in portions that are examined. This makes it more likely that all the cancer is removed. There is a very low chance of it coming back. Also, less healthy tissue often needs to be removed than with standard tumor removal.  After the tumor has been removed, the surgeon can often fix the area during the same surgery.    Why Mohs surgery is done    Mohs surgery is most often used for skin cancer that is:    Large or fast-growing    Likely to spread    Likely to come back, or has come back    On an area where it is important to remove as little tissue as possible (such as the nose, lips, ears, eyelids, or hands)    Very aggressive    How Mohs surgery is done    The surgery is most often done in an office or surgery center. It is done on an outpatient basis. This means you go home after the surgery is done. The procedure can take up to 4 hours or longer. During the surgery:      You change into a patient gown or stay in your regular clothes.    The area around the tumor is injected with medicine. This numbs the area and prevents pain.    The surgeon removes the first layer of tissue. The area is covered with a small bandage.    You move to a waiting area.    The tissue is examined. This can take up to an hour or longer. During this time, you can read or watch TV.    You return to the surgical room to have more of the tumor removed.    This process is repeated until the removed tissue shows no more cancer cells. Once the  tumor is removed, you and your surgeon can decide how best to repair the surgery site. The repair may be done right away. Or the repair surgery may be scheduled for another day.              Risks of Mohs surgery      Bleeding    Infection at the incision site    Nerve damage    Pain    Problems with reconstruction    Recurrence of the skin cancer and need for further treatment    Reopening of the incision after surgery    Scarring    Severe bruising    If you have any questions regarding your upcoming procedure please call [facility] at [phone]

## 2019-04-25 NOTE — LETTER
You are scheduled for Mohs Surgery on 5.1.2019 at 7:45AM    Please check in at Dermatology Clinic.     (2nd Floor, last  Clinic on right up staircase or elevator -past OB/GYN clinic)    You don't need to arrive more than 5-10 minutes prior to your appointment time.     Be sure to eat a good breakfast and bathe and wash your hair prior to Surgery.    If you are taking any anti-coagulants that are prescribed by your Doctor (such as Coumadin/Warfarin, Plavix, Aspirin, Ibuprofen), please continue taking them.    However, If you are taking anti-coagulants over the counter without a Doctor's order for a Medical condition, please discontinue them 10 days prior to Surgery.      Please wear loose comfortable clothing as it could possibly be 4-6 hours until your surgery is completed depending upon how many layers of tissue need to be removed.     Wi-fi access is available.       Understanding Mohs Surgery    Mohs surgery is done to help treat skin cancer. During the surgery, a thin section of the tumor and a small area around it are removed. The removed tissue is looked at right away under a microscope. The process is then repeated. Sections of tissue continue to be removed and looked at under a microscope until no more cancer cells are found.  During standard tumor removal, the tumor and a margin of healthy tissue around it are removed all at once. Mohs surgery removes the tumor in portions that are examined. This makes it more likely that all the cancer is removed. There is a very low chance of it coming back. Also, less healthy tissue often needs to be removed than with standard tumor removal.  After the tumor has been removed, the surgeon can often fix the area during the same surgery.    Why Mohs surgery is done    Mohs surgery is most often used for skin cancer that is:    Large or fast-growing    Likely to spread    Likely to come back, or has come back    On an area where it is important to remove as little tissue as  possible (such as the nose, lips, ears, eyelids, or hands)    Very aggressive        How Mohs surgery is done    The surgery is most often done in an office or surgery center. It is done on an outpatient basis. This means you go home after the surgery is done. The procedure can take up to 4 hours or longer. During the surgery:      You change into a patient gown or stay in your regular clothes.    The area around the tumor is injected with medicine. This numbs the area and prevents pain.    The surgeon removes the first layer of tissue. The area is covered with a small bandage.    You move to a waiting area.    The tissue is examined. This can take up to an hour or longer. During this time, you can read or watch TV.    You return to the surgical room to have more of the tumor removed.    This process is repeated until the removed tissue shows no more cancer cells. Once the tumor is removed, you and your surgeon can decide how best to repair the surgery site. The repair may be done right away. Or the repair surgery may be scheduled for another day.    Risks of Mohs surgery      Bleeding    Infection at the incision site    Nerve damage    Pain    Problems with reconstruction    Recurrence of the skin cancer and need for further treatment    Reopening of the incision after surgery    Scarring    Severe bruising    If you have any questions regarding your upcoming procedure please call [facility] at [phone]

## 2019-04-25 NOTE — TELEPHONE ENCOUNTER
Message left for patient to please return call regarding upcoming appointment on 5.1.2019 with Dr. Moon for a Moh's procedure. Please review appointment information with patient when returns call. Letter sent.    Nora Phillips LPN.................4/25/2019

## 2019-04-25 NOTE — TELEPHONE ENCOUNTER
I spoke to Rommel and gave him instruction on MOHS procedure. Written information was sent as well. Christiana ELKINS Rn

## 2019-04-29 ENCOUNTER — TELEPHONE (OUTPATIENT)
Dept: FAMILY MEDICINE | Facility: CLINIC | Age: 64
End: 2019-04-29

## 2019-04-29 ENCOUNTER — MYC MEDICAL ADVICE (OUTPATIENT)
Dept: FAMILY MEDICINE | Facility: CLINIC | Age: 64
End: 2019-04-29

## 2019-04-29 NOTE — TELEPHONE ENCOUNTER
See mychart encounter- area does not look infected.    Faby Milton,RN BSN  [facility]  [phone]

## 2019-04-29 NOTE — TELEPHONE ENCOUNTER
Patient calling with questions: he thinks the bx site is infected. The area is oozing.  There is no increased pain/redness/heat. Just oozing.  Advised that I would need to see the site to evaluate if there is an infection. Asked patient if he could send a photo via [employer] or he would need to come in today to have provider look at it to determine if he needs an antibiotic.  patient will try to send a mychart photo  patient is concerned that the MOHS will be cancelled if he has an infection. He states the site is increasing in size and he wont have an ear left if he puts it off any longer    Faby Milton,RN BSN  [facility]  [phone]

## 2019-05-01 ENCOUNTER — OFFICE VISIT (OUTPATIENT)
Dept: DERMATOLOGY | Facility: CLINIC | Age: 64
End: 2019-05-01
Payer: COMMERCIAL

## 2019-05-01 VITALS — HEART RATE: 84 BPM | DIASTOLIC BLOOD PRESSURE: 73 MMHG | OXYGEN SATURATION: 96 % | SYSTOLIC BLOOD PRESSURE: 132 MMHG

## 2019-05-01 DIAGNOSIS — L82.1 SEBORRHEIC KERATOSIS: ICD-10-CM

## 2019-05-01 DIAGNOSIS — L73.8 SENILE SEBACEOUS GLAND HYPERPLASIA: ICD-10-CM

## 2019-05-01 DIAGNOSIS — C44.222 SQUAMOUS CELL CANCER OF SKIN OF HELIX IN RIGHT EAR: Primary | ICD-10-CM

## 2019-05-01 DIAGNOSIS — L81.4 LENTIGO: ICD-10-CM

## 2019-05-01 DIAGNOSIS — D18.00 ANGIOMA: ICD-10-CM

## 2019-05-01 PROCEDURE — 17311 MOHS 1 STAGE H/N/HF/G: CPT | Mod: 51 | Performed by: DERMATOLOGY

## 2019-05-01 PROCEDURE — 99203 OFFICE O/P NEW LOW 30 MIN: CPT | Mod: 57 | Performed by: DERMATOLOGY

## 2019-05-01 PROCEDURE — 13152 CMPLX RPR E/N/E/L 2.6-7.5 CM: CPT | Mod: 59 | Performed by: DERMATOLOGY

## 2019-05-01 PROCEDURE — 17312 MOHS ADDL STAGE: CPT | Performed by: DERMATOLOGY

## 2019-05-01 PROCEDURE — 14060 TIS TRNFR E/N/E/L 10 SQ CM/<: CPT | Performed by: DERMATOLOGY

## 2019-05-01 NOTE — NURSING NOTE
Chief Complaint   Patient presents with     Derm Problem     mohs       Vitals:    05/01/19 0812   BP: 132/73   Pulse: 84   SpO2: 96%     Wt Readings from Last 1 Encounters:   04/16/19 119.7 kg (264 lb)       Nora Phillips LPN.................5/1/2019

## 2019-05-01 NOTE — PATIENT INSTRUCTIONS
Sutured Wound Care     [facility]: [phone]    [facility]: [phone]      ? No strenuous activity for 48 hours. Resume moderate activity in 48 hours. No heavy exercising until you are seen for follow up in one week.     ? Take Tylenol as needed for discomfort.                         ? Do not drink alcoholic beverages for 48 hours.     ? Keep the pressure bandage in place for 24 hours. If the bandage becomes blood tinged or loose, reinforce it with gauze and tape.        (Refer to the reverse side of this page for management of bleeding).    ? Remove pressure bandage in 24 hours     ? Leave the flat bandage in place until your follow up appointment.    ? Keep the bandage dry. Wash around it carefully.    ? If the tape becomes soiled or starts to come off, reinforce it with additional paper tape.    ? Do not smoke for 3 weeks; smoking is detrimental to wound healing.    ? It is normal to have swelling and bruising around the surgical site. The bruising will fade in approximately 10-14 days. Elevate the area to reduce swelling.    ? Numbness, itchiness and sensitivity to temperature changes can occur after surgery and may take up to 18 months to normalize.      POSSIBLE COMPLICATIONS    BLEEDIN. Leave the bandage in place.  2. Use tightly rolled up gauze or a cloth to apply direct pressure over the bandage for 20   minutes.  3. Reapply pressure for an additional 20 minutes if necessary  4. Call the office or go to the nearest emergency room if pressure fails to stop the bleeding.  5. Use additional gauze and tape to maintain pressure once the bleeding has stopped.        PAIN:    1. Post operative pain should slowly get better, never worse.  2. A severe increase in pain may indicate a problem. Call the office if this occurs.    In case of emergency phone:Dr Moon [phone]

## 2019-05-01 NOTE — LETTER
2019         RE: Rommel Bryan  [address] [id number]        Dear Colleague,    Thank you for referring your patient, Rommel Bryan, to the [facility]. Please see a copy of my visit note below.    Surgical Office Location :   [facility]  [address] [place] [address]      Rommel Bryan , a 64 year old year old male patient, I was asked to see by Dr. 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  Patient states this has been present for a while.  Patient reports the following symptoms:  Growing and painful.   .  Patient reports the following previous treatments none.  Patient reports the following modifying factors none.  Associated symptoms: none.  Patient has no other skin complaints today.  Remainder of the HPI, Meds, PMH, Allergies, FH, and SH was reviewed in chart.      Past Medical History:   Diagnosis Date     Asthma      Family history of coronary artery disease 2013     HTN (hypertension)      Hypercholesterolemia      Hyperlipidemia LDL goal <130 2013     Moderate persistent asthma 2013     Obesity, Class III, BMI 40-49.9 (morbid obesity) (H) 2013     LYRIC (obstructive sleep apnea)      Squamous cell carcinoma      Transaminasemia 2013       Past Surgical History:   Procedure Laterality Date     COLONOSCOPY  3/11/14     COLONOSCOPY  3/11/2014    Procedure: COLONOSCOPY;  colonoscopy;  Surgeon: Alirio Mao MD;  Location:  GI     REPAIR NERVE PRIMARY PERIPHERAL  2013        Family History   Problem Relation Age of Onset     Neurologic Disorder Mother         alzheimer      Diabetes Mother      Hypertension Mother      Cardiovascular Father         chf      Prostate Cancer Maternal Uncle      Asthma No family hx of      Cancer - colorectal No family hx of      Anesthesia Reaction No family hx of      Blood Disease No family hx of      Eye Disorder No family hx  of        Social History     Socioeconomic History     Marital status:      Spouse name: Not on file     Number of children: Not on file     Years of education: Not on file     Highest education level: Not on file   Occupational History     Occupation:  for [employer]     Employer: [employer]   Social Needs     Financial resource strain: Not on file     Food insecurity:     Worry: Not on file     Inability: Not on file     Transportation needs:     Medical: Not on file     Non-medical: Not on file   Tobacco Use     Smoking status: Never Smoker     Smokeless tobacco: Never Used   Substance and Sexual Activity     Alcohol use: No     Comment: maybe 3 glasses a year      Drug use: No     Sexual activity: Not Currently   Lifestyle     Physical activity:     Days per week: Not on file     Minutes per session: Not on file     Stress: Not on file   Relationships     Social connections:     Talks on phone: Not on file     Gets together: Not on file     Attends Cheondo[employer] service: Not on file     Active member of club or organization: Not on file     Attends meetings of clubs or organizations: Not on file     Relationship status: Not on file     Intimate partner violence:     Fear of current or ex partner: Not on file     Emotionally abused: Not on file     Physically abused: Not on file     Forced sexual activity: Not on file   Other Topics Concern     Parent/sibling w/ CABG, MI or angioplasty before 65F 55M? Not Asked      Service No     Blood Transfusions No     Caffeine Concern Not Asked     Occupational Exposure Not Asked     Hobby Hazards Not Asked     Sleep Concern Not Asked     Stress Concern Not Asked     Weight Concern Not Asked     Special Diet Not Asked     Back Care Not Asked     Exercise No     Bike Helmet Not Asked     Seat Belt Yes     Self-Exams Not Asked   Social History Narrative    Eats fruits and vegetables every day. He has at least 3 servings of dairy per day.  Vitamin D supplement recommended.       Outpatient Encounter Medications as of 5/1/2019   Medication Sig Dispense Refill     albuterol (PROAIR HFA, PROVENTIL HFA, VENTOLIN HFA) 108 (90 BASE) MCG/ACT inhaler Inhale 2 puffs into the lungs every 6 hours as needed for shortness of breath / dyspnea 1 Inhaler 1     aspirin 81 MG tablet Take 1 tablet by mouth daily.       atorvastatin (LIPITOR) 40 MG tablet Take 1 tablet (40 mg) by mouth daily 90 tablet 3     blood glucose (NO BRAND SPECIFIED) test strip Use to test blood sugars bid times daily or as directed 200 strip 3     blood glucose monitoring (NO BRAND SPECIFIED) meter device kit Use to test blood sugar bidtimes daily or as directed. 1 kit 3     fluticasone (FLOVENT DISKUS) 100 MCG/BLIST inhaler Inhale 1 puff into the lungs 2 times daily 3 Inhaler 1     glipiZIDE (GLUCOTROL) 5 MG tablet TAKE ONE TABLET BY MOUTH EVERY MORNING BEFORE BREAKFAST 90 tablet 3     lisinopril (PRINIVIL/ZESTRIL) 5 MG tablet Take 1 tablet (5 mg) by mouth daily 30 tablet 0     metFORMIN (GLUCOPHAGE) 500 MG tablet Take 1 tablet (500 mg) by mouth daily (with dinner) 60 tablet 3     order for DME Equipment being ordered: CPAP mask only 1 each 0     order for DME Equipment being ordered: CPAP mask   full face mask (CPT code: a7030) and head gear (CPT code: ). 1 each 0     No facility-administered encounter medications on file as of 5/1/2019.              Review Of Systems  Skin: As above  Eyes: negative  Ears/Nose/Throat: negative  Respiratory: No shortness of breath, dyspnea on exertion, cough, or hemoptysis  Cardiovascular: negative  Gastrointestinal: negative  Genitourinary: negative  Musculoskeletal: negative  Neurologic: negative  Psychiatric: negative  Hematologic/Lymphatic/Immunologic: negative  Endocrine: negative      O:   NAD, WDWN, Alert & Oriented, Mood & Affect wnl, Vitals stable   Here today alone   /73   Pulse 84   SpO2 96%    General appearance david ii   Vitals  stable   Alert, oriented and in no acute distress      Following lymph nodes palpated: Occipital, Cervical, Supraclavicular no lad   R helix 1.3cm ulcerated red plaque   Stuck on papules and brown macules on trunk and ext   Red papules on trunk  Yellow papules on face    The remainder of expanded problem focused exam was unremarkable; the following areas were examined:  scalp/hair, conjunctiva/lids, face, neck, lips, chest, digits/nails, RUE, LUE.      Eyes: Conjunctivae/lids:Normal     ENT: Lips, buccal mucosa, tongue: normal    MSK:Normal    Cardiovascular: peripheral edema none    Pulm: Breathing Normal    Lymph Nodes: No Head and Neck Lymphadenopathy     Neuro/Psych: Orientation:Normal; Mood/Affect:Normal      A/P:  1. R helix squamous cell carcinoma   2. Seborrheic keratosis, letnigo, angioma, sebaceous hyperplasia   BENIGN LESIONS DISCUSSED WITH PATIENT:  I discussed the specifics of tumor, prognosis, and genetics of benign lesions.  I explained that treatment of these lesions would be purely cosmetic and not medically neccessary.  I discussed with patient different removal options including excision, cautery and /or laser.      Nature and genetics of benign skin lesions dicussed with patient.  Signs and Symptoms of skin cancer discussed with patient.  Patient encouraged to perform monthly skin exams.  UV precautions reviewed with patient.  Patient to follow up with Primary Care provider regarding elevated blood pressure.    Skin care regimen reviewed with patient: Eliminate harsh soaps, i.e. Dial, zest, irsih spring; Mild soaps such as Cetaphil or Dove sensitive skin, avoid hot or cold showers, aggressive use of emollients including vanicream, cetaphil or cerave discussed with patient.    Risks of non-melanoma skin cancer discussed with patient   Return to clinic 6 months  Rommel to follow up with Primary Care provider regarding elevated blood pressure.  PROCEDURE NOTE  R post helix squamous cell carcinoma    MOHS:   Size    After PGACAC discussed with patient, decision for Mohs surgery was made. Indication for Mohs was Size. Patient confirmed the site with Dr. Moon.  After anesthesia with LEC, the tumor was excised using standard Mohs technique in 3 stages(s).  CLEAR MARGINS OBTAINED and Final defect size was 2.5 cm.       REPAIR COMPLEX: Because of the tightness of the surrounding skin and Because of the size and full thickness nature of the defect, a complex closure was planned. After LEC anesthesia and prep, Burow's triangles were excised in the relaxed skin tension lines. The wound edges were widely undermined by dissection in the subcutaneous plane until adequate tissue mobility was obtained. Hemostasis was obtained. The wound edges were closed in a layered fashion using Vicryl and Fast Absorbing Plain Gut sutures. Postoperative length was 5.3 cm.   EBL minimal; complications none; wound care routine.  The patient was discharged in good condition and will return in one week for wound evaluation.    REPAIR ZPLASTY: Because of the tightness of the surrounding skin and webbing over the helical rim, a Z plasty was planned.  3 limbs (ie, a central and 2 parallel lateral limbs) of equal length with the 2 lateral limbs aligned to the central limb at identical 60  angles were incised over the scar line.  The wound edges were widely undermined by dissection in the subcutaneous plane until adequate tissue mobility was obtained.  Hemostasis was obtained. The wound edges were closed in a layered fashion using Vicryl and Fast Absorbing Plain Gut sutures. Postoperative size was 1.1x1.2 cm.   EBL minimal; complications none; wound care routine.  The patient was discharged in good condition and will return in one week for wound evaluation.        Again, thank you for allowing me to participate in the care of your patient.        Sincerely,        Zak Moon MD

## 2019-05-01 NOTE — PROGRESS NOTES
Rommel Bryan , a 64 year old year old male patient, I was asked to see by Dr. 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  Patient states this has been present for a while.  Patient reports the following symptoms:  Growing and painful.   .  Patient reports the following previous treatments none.  Patient reports the following modifying factors none.  Associated symptoms: none.  Patient has no other skin complaints today.  Remainder of the HPI, Meds, PMH, Allergies, FH, and SH was reviewed in chart.      Past Medical History:   Diagnosis Date     Asthma      Family history of coronary artery disease 2013     HTN (hypertension)      Hypercholesterolemia      Hyperlipidemia LDL goal <130 2013     Moderate persistent asthma 2013     Obesity, Class III, BMI 40-49.9 (morbid obesity) (H) 2013     LYRIC (obstructive sleep apnea)      Squamous cell carcinoma      Transaminasemia 2013       Past Surgical History:   Procedure Laterality Date     COLONOSCOPY  3/11/14     COLONOSCOPY  3/11/2014    Procedure: COLONOSCOPY;  colonoscopy;  Surgeon: Alirio Mao MD;  Location: [facility]     REPAIR NERVE PRIMARY PERIPHERAL  2013        Family History   Problem Relation Age of Onset     Neurologic Disorder Mother         alzheimer      Diabetes Mother      Hypertension Mother      Cardiovascular Father         chf      Prostate Cancer Maternal Uncle      Asthma No family hx of      Cancer - colorectal No family hx of      Anesthesia Reaction No family hx of      Blood Disease No family hx of      Eye Disorder No family hx of        Social History     Socioeconomic History     Marital status:      Spouse name: Not on file     Number of children: Not on file     Years of education: Not on file     Highest education level: Not on file   Occupational History     Occupation:  for [employer]     Employer: [employer]   Social Needs     Financial  resource strain: Not on file     Food insecurity:     Worry: Not on file     Inability: Not on file     Transportation needs:     Medical: Not on file     Non-medical: Not on file   Tobacco Use     Smoking status: Never Smoker     Smokeless tobacco: Never Used   Substance and Sexual Activity     Alcohol use: No     Comment: maybe 3 glasses a year      Drug use: No     Sexual activity: Not Currently   Lifestyle     Physical activity:     Days per week: Not on file     Minutes per session: Not on file     Stress: Not on file   Relationships     Social connections:     Talks on phone: Not on file     Gets together: Not on file     Attends Scientology service: Not on file     Active member of club or organization: Not on file     Attends meetings of clubs or organizations: Not on file     Relationship status: Not on file     Intimate partner violence:     Fear of current or ex partner: Not on file     Emotionally abused: Not on file     Physically abused: Not on file     Forced sexual activity: Not on file   Other Topics Concern     Parent/sibling w/ CABG, MI or angioplasty before 65F 55M? Not Asked      Service No     Blood Transfusions No     Caffeine Concern Not Asked     Occupational Exposure Not Asked     Hobby Hazards Not Asked     Sleep Concern Not Asked     Stress Concern Not Asked     Weight Concern Not Asked     Special Diet Not Asked     Back Care Not Asked     Exercise No     Bike Helmet Not Asked     Seat Belt Yes     Self-Exams Not Asked   Social History Narrative    Eats fruits and vegetables every day. He has at least 3 servings of dairy per day. Vitamin D supplement recommended.       Outpatient Encounter Medications as of 5/1/2019   Medication Sig Dispense Refill     albuterol (PROAIR HFA, PROVENTIL HFA, VENTOLIN HFA) 108 (90 BASE) MCG/ACT inhaler Inhale 2 puffs into the lungs every 6 hours as needed for shortness of breath / dyspnea 1 Inhaler 1     aspirin 81 MG tablet Take 1 tablet by mouth  daily.       atorvastatin (LIPITOR) 40 MG tablet Take 1 tablet (40 mg) by mouth daily 90 tablet 3     blood glucose (NO BRAND SPECIFIED) test strip Use to test blood sugars bid times daily or as directed 200 strip 3     blood glucose monitoring (NO BRAND SPECIFIED) meter device kit Use to test blood sugar bidtimes daily or as directed. 1 kit 3     fluticasone (FLOVENT DISKUS) 100 MCG/BLIST inhaler Inhale 1 puff into the lungs 2 times daily 3 Inhaler 1     glipiZIDE (GLUCOTROL) 5 MG tablet TAKE ONE TABLET BY MOUTH EVERY MORNING BEFORE BREAKFAST 90 tablet 3     lisinopril (PRINIVIL/ZESTRIL) 5 MG tablet Take 1 tablet (5 mg) by mouth daily 30 tablet 0     metFORMIN (GLUCOPHAGE) 500 MG tablet Take 1 tablet (500 mg) by mouth daily (with dinner) 60 tablet 3     order for DME Equipment being ordered: CPAP mask only 1 each 0     order for DME Equipment being ordered: CPAP mask   full face mask (CPT code: a7030) and head gear (CPT code: ). 1 each 0     No facility-administered encounter medications on file as of 5/1/2019.              Review Of Systems  Skin: As above  Eyes: negative  Ears/Nose/Throat: negative  Respiratory: No shortness of breath, dyspnea on exertion, cough, or hemoptysis  Cardiovascular: negative  Gastrointestinal: negative  Genitourinary: negative  Musculoskeletal: negative  Neurologic: negative  Psychiatric: negative  Hematologic/Lymphatic/Immunologic: negative  Endocrine: negative      O:   NAD, WDWN, Alert & Oriented, Mood & Affect wnl, Vitals stable   Here today alone   /73   Pulse 84   SpO2 96%    General appearance david ii   Vitals stable   Alert, oriented and in no acute distress      Following lymph nodes palpated: Occipital, Cervical, Supraclavicular no lad   R helix 1.3cm ulcerated red plaque   Stuck on papules and brown macules on trunk and ext   Red papules on trunk  Yellow papules on face    The remainder of expanded problem focused exam was unremarkable; the following areas were  examined:  scalp/hair, conjunctiva/lids, face, neck, lips, chest, digits/nails, RUE, LUE.      Eyes: Conjunctivae/lids:Normal     ENT: Lips, buccal mucosa, tongue: normal    MSK:Normal    Cardiovascular: peripheral edema none    Pulm: Breathing Normal    Lymph Nodes: No Head and Neck Lymphadenopathy     Neuro/Psych: Orientation:Normal; Mood/Affect:Normal      A/P:  1. R helix squamous cell carcinoma   2. Seborrheic keratosis, letnigo, angioma, sebaceous hyperplasia   BENIGN LESIONS DISCUSSED WITH PATIENT:  I discussed the specifics of tumor, prognosis, and genetics of benign lesions.  I explained that treatment of these lesions would be purely cosmetic and not medically neccessary.  I discussed with patient different removal options including excision, cautery and /or laser.      Nature and genetics of benign skin lesions dicussed with patient.  Signs and Symptoms of skin cancer discussed with patient.  Patient encouraged to perform monthly skin exams.  UV precautions reviewed with patient.  Patient to follow up with Primary Care provider regarding elevated blood pressure.    Skin care regimen reviewed with patient: Eliminate harsh soaps, i.e. Dial, zest, irsih spring; Mild soaps such as Cetaphil or Dove sensitive skin, avoid hot or cold showers, aggressive use of emollients including vanicream, cetaphil or cerave discussed with patient.    Risks of non-melanoma skin cancer discussed with patient   Return to clinic 6 months  Rommel to follow up with Primary Care provider regarding elevated blood pressure.  PROCEDURE NOTE  R post helix squamous cell carcinoma   MOHS:   Size    After PGACAC discussed with patient, decision for Mohs surgery was made. Indication for Mohs was Size. Patient confirmed the site with Dr. Moon.  After anesthesia with LEC, the tumor was excised using standard Mohs technique in 3 stages(s).  CLEAR MARGINS OBTAINED and Final defect size was 2.5 cm.       REPAIR COMPLEX: Because of the  tightness of the surrounding skin and Because of the size and full thickness nature of the defect, a complex closure was planned. After LEC anesthesia and prep, Burow's triangles were excised in the relaxed skin tension lines. The wound edges were widely undermined by dissection in the subcutaneous plane until adequate tissue mobility was obtained. Hemostasis was obtained. The wound edges were closed in a layered fashion using Vicryl and Fast Absorbing Plain Gut sutures. Postoperative length was 5.3 cm.   EBL minimal; complications none; wound care routine.  The patient was discharged in good condition and will return in one week for wound evaluation.    REPAIR ZPLASTY: Because of the tightness of the surrounding skin and webbing over the helical rim, a Z plasty was planned.  3 limbs (ie, a central and 2 parallel lateral limbs) of equal length with the 2 lateral limbs aligned to the central limb at identical 60  angles were incised over the scar line.  The wound edges were widely undermined by dissection in the subcutaneous plane until adequate tissue mobility was obtained.  Hemostasis was obtained. The wound edges were closed in a layered fashion using Vicryl and Fast Absorbing Plain Gut sutures. Postoperative size was 1.1x1.2 cm.   EBL minimal; complications none; wound care routine.  The patient was discharged in good condition and will return in one week for wound evaluation.

## 2019-05-08 ENCOUNTER — ALLIED HEALTH/NURSE VISIT (OUTPATIENT)
Dept: DERMATOLOGY | Facility: CLINIC | Age: 64
End: 2019-05-08
Payer: COMMERCIAL

## 2019-05-08 DIAGNOSIS — Z48.01 ENCOUNTER FOR CHANGE OR REMOVAL OF SURGICAL WOUND DRESSING: Primary | ICD-10-CM

## 2019-05-08 PROCEDURE — 99207 ZZC NO CHARGE NURSE ONLY: CPT

## 2019-05-08 NOTE — NURSING NOTE
Pt returned to clinic for post surgery 1 week follow up bandage change. Pt has no complaints, denies pain. Bandage removed from right ear, area cleansed with normal saline. Site is healing and wound edges approximating well. Reapplied new steri strips and paper tape.    Advised to watch for signs/sx of infection; spreading redness, drainage, odor, fever. Call or report promptly to clinic. Pt given written instructions and informed to rtc as needed. Patient verbalized understanding.     COLTEN Cortés-BSN  [facility]  [phone]

## 2019-05-08 NOTE — PATIENT INSTRUCTIONS

## 2019-05-15 ENCOUNTER — OFFICE VISIT (OUTPATIENT)
Dept: FAMILY MEDICINE | Facility: CLINIC | Age: 64
End: 2019-05-15
Payer: COMMERCIAL

## 2019-05-15 VITALS — SYSTOLIC BLOOD PRESSURE: 122 MMHG | DIASTOLIC BLOOD PRESSURE: 68 MMHG

## 2019-05-15 DIAGNOSIS — D48.5 NEOPLASM OF UNCERTAIN BEHAVIOR OF SKIN: Primary | ICD-10-CM

## 2019-05-15 DIAGNOSIS — Z85.828 HISTORY OF SQUAMOUS CELL CARCINOMA OF SKIN: ICD-10-CM

## 2019-05-15 DIAGNOSIS — L82.0 INFLAMED SEBORRHEIC KERATOSIS: ICD-10-CM

## 2019-05-15 PROCEDURE — 11311 SHAVE SKIN LESION 0.6-1.0 CM: CPT | Mod: 51 | Performed by: FAMILY MEDICINE

## 2019-05-15 PROCEDURE — 11310 SHAVE SKIN LESION 0.5 CM/<: CPT | Performed by: FAMILY MEDICINE

## 2019-05-15 PROCEDURE — 88305 TISSUE EXAM BY PATHOLOGIST: CPT | Mod: TC | Performed by: FAMILY MEDICINE

## 2019-05-15 NOTE — PROGRESS NOTES
"[facility] - PRIMARY CARE SKIN    CC: Lesion(s)  SUBJECTIVE:   Rommel Bryan is a(n) 64 year old male who presents to clinic today because of a new lesion on the left ear.    His last full-body skin cancer screening was on 4/11/19.    Issue One: Lesion on left ear. It feels similar to the squamous cell carcinoma that was recently excised on the right ear. He denies picking at it.  Onset: first noticed yesterday  Enlarging: YES.  Bleeding: NO.  Itchy or irritating: NO.  Pain or tenderness: NO.  Changing color: NO.    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    INTEGUMENTARY/SKIN: POSITIVE for changing lesion  ROS: 14 point review of systems was negative except the symptoms listed above in the HPI.    This document serves as a record of the services and decisions personally performed and made by Margie Anna MD and was created by iDno Reaves, a trained medical scribe, based on personal observations and provider statements to the medical scribe.  May 15, 2019 9:53 AM   Dino Reaves    OBJECTIVE:   GENERAL: healthy, alert and no distress.  SKIN: Guallpa Skin Type - III.  Scalp, Ear examined. The dermatoscope was used to help evaluate pigmented lesions.  Skin Pertinent Findings:    Right temple: 6 mm x 4 mm in size scaly coarse lesion. ? Seborrheic keratosis ? Other      Left ear, mid-helix: 4 mm in size smooth nodule. ? chondrodermatitis nodularis helicis ? Basal cell carcinoma ? Other      ASSESSMENT:     Encounter Diagnoses   Name Primary?     Neoplasm of uncertain behavior of skin Yes     History of squamous cell carcinoma of skin        PLAN:   Patient " "Instructions   FUTURE APPOINTMENTS  Follow up per pathology report.  Follow up in July 2019 for a head and neck skin cancer screening.  Follow up in April 2020 for a full-body skin cancer screening.    WOUND CARE INSTRUCTIONS  1. Wash hands before every dressing change.  2. After 24 hours, change dressing daily.  3. Wash the wound area with a mild soap, then rinse.  4. Gently pat dry with a sterile gauze or Q-tip.  5. Using a Q-tip, apply Vaseline or Aquaphor only over entire wound. Do NOT use Neosporin - as many people react to neomycin.  6. Finally, cover with a bandage or sterile non-stick gauze with micropore paper tape.  7. Repeat once daily until wound has healed.      Soap, water and shampoo will not hurt this area.    Do not go swimming or take baths, but showering is encouraged.    Limit use of the area where the procedure was done for a few days to allow for optimal healing.    If you experience bleeding:  Wash hands and hold firm pressure on the area for 10 minutes without checking to see if the bleeding has stopped. \"Checking\" pulls off the protective wound clot and restarts the bleeding all over again. Re-apply pressure for 10 minutes if necessary to stop bleeding.  Use additional sterile gauze and tape to maintain pressure once bleeding has stopped.  If bleeding continues, then call back to clinic at [phone].    Signs of Infection:  Infection can occur in any area where skin has been disrupted.  If you notice persistent redness, swelling, colored drainage, increasing pain, fever or other signs of infection, please call us at: [phone] and ask to have me or my colleague paged. We will call you back to discuss.    Pathology Results:  You will be notified, generally via letter or MyChart, in approximately 10 days. If there is anything we need to discuss or further treatment needed, I will call you to discuss it.    PATIENT INFORMATION : WOUNDS  During the healing process you will notice a " number of changes. All wounds develop a small halo of redness surrounding the wound.  This means healing is occurring. Severe itching with extensive redness usually indicates sensitivity to the ointment or bandage tape used to dress the wound.  You should call our office if this develops.      Swelling  and/or discoloration around your surgical site is common, particularly when performed around the eye.    All wounds normally drain.  The larger the wound the more drainage there will be.  After 7-10 days, you will notice the wound beginning to shrink and new skin will begin to grow.  The wound is healed when you can see skin has formed over the entire area.  A healed wound has a healthy, shiny look to the surface and is red to dark pink in color to normalize.  Wounds may take approximately 4-6 weeks to heal.  Larger wounds may take 6-8 weeks. After the wound is healed you may discontinue dressing changes.    You may experience a sensation of tightness as your wound heals. This is normal and will gradually subside.    Your healed wound may be sensitive to temperature changes. This sensitivity improves with time, but if you re having a lot of discomfort, try to avoid temperature extremes.    Patients frequently experience itching after their wound appears to have healed because of the continue healing under the skin.  Plain Vaseline will help relieve the itching.          TT: 20 minutes.  CT: 15 minutes.    The information in this document, created by the medical scribe for me, accurately reflects the services I personally performed and the decisions made by me. I have reviewed and approved this document for accuracy prior to leaving the patient care area.  May 15, 2019 9:53 AM  Margie Anna MD  [facility] [place]

## 2019-05-15 NOTE — LETTER
"    5/15/2019         RE: Rommel Bryan  [id number] [facility] [id number]        Dear Colleague,    Thank you for referring your patient, Rommel Bryan, to the [facility] [place]. Please see a copy of my visit note below.    [facility] - PRIMARY CARE SKIN    CC: Lesion(s)  SUBJECTIVE:   Rommel Bryan is a(n) 64 year old male who presents to clinic today because of a new lesion on the left ear.    His last full-body skin cancer screening was on 4/11/19.    Issue One: Lesion on left ear. It feels similar to the squamous cell carcinoma that was recently excised on the right ear. He denies picking at it.  Onset: first noticed yesterday  Enlarging: YES.  Bleeding: NO.  Itchy or irritating: NO.  Pain or tenderness: NO.  Changing color: NO.    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    INTEGUMENTARY/SKIN: POSITIVE for changing lesion  ROS: 14 point review of systems was negative except the symptoms listed above in the HPI.    This document serves as a record of the services and decisions personally performed and made by Margie Anna MD and was created by Dino Reaves, a trained medical scribe, based on personal observations and provider statements to the medical scribe.  May 15, 2019 9:53 AM   Dino Reaves    OBJECTIVE:   GENERAL: healthy, alert and no distress.  SKIN: Guallpa Skin Type - III.  Scalp, Ear examined. The dermatoscope was used to help evaluate pigmented lesions.  Skin Pertinent Findings:    Right temple: 6 mm x 4 mm in size scaly coarse lesion. ? Seborrheic keratosis ? Other      Left ear, mid-helix: 4 mm in size " "smooth nodule. ? chondrodermatitis nodularis helicis ? Basal cell carcinoma ? Other      ASSESSMENT:     Encounter Diagnoses   Name Primary?     Neoplasm of uncertain behavior of skin Yes     History of squamous cell carcinoma of skin        PLAN:   Patient Instructions   FUTURE APPOINTMENTS  Follow up per pathology report.  Follow up in July 2019 for a head and neck skin cancer screening.  Follow up in April 2020 for a full-body skin cancer screening.    WOUND CARE INSTRUCTIONS  1. Wash hands before every dressing change.  2. After 24 hours, change dressing daily.  3. Wash the wound area with a mild soap, then rinse.  4. Gently pat dry with a sterile gauze or Q-tip.  5. Using a Q-tip, apply Vaseline or Aquaphor only over entire wound. Do NOT use Neosporin - as many people react to neomycin.  6. Finally, cover with a bandage or sterile non-stick gauze with micropore paper tape.  7. Repeat once daily until wound has healed.      Soap, water and shampoo will not hurt this area.    Do not go swimming or take baths, but showering is encouraged.    Limit use of the area where the procedure was done for a few days to allow for optimal healing.    If you experience bleeding:  Wash hands and hold firm pressure on the area for 10 minutes without checking to see if the bleeding has stopped. \"Checking\" pulls off the protective wound clot and restarts the bleeding all over again. Re-apply pressure for 10 minutes if necessary to stop bleeding.  Use additional sterile gauze and tape to maintain pressure once bleeding has stopped.  If bleeding continues, then call back to clinic at [phone].    Signs of Infection:  Infection can occur in any area where skin has been disrupted.  If you notice persistent redness, swelling, colored drainage, increasing pain, fever or other signs of infection, please call us at: [phone] and ask to have me or my colleague paged. We will call you back to discuss.    Pathology Results:  You " will be notified, generally via letter or MyChart, in approximately 10 days. If there is anything we need to discuss or further treatment needed, I will call you to discuss it.    PATIENT INFORMATION : WOUNDS  During the healing process you will notice a number of changes. All wounds develop a small halo of redness surrounding the wound.  This means healing is occurring. Severe itching with extensive redness usually indicates sensitivity to the ointment or bandage tape used to dress the wound.  You should call our office if this develops.      Swelling  and/or discoloration around your surgical site is common, particularly when performed around the eye.    All wounds normally drain.  The larger the wound the more drainage there will be.  After 7-10 days, you will notice the wound beginning to shrink and new skin will begin to grow.  The wound is healed when you can see skin has formed over the entire area.  A healed wound has a healthy, shiny look to the surface and is red to dark pink in color to normalize.  Wounds may take approximately 4-6 weeks to heal.  Larger wounds may take 6-8 weeks. After the wound is healed you may discontinue dressing changes.    You may experience a sensation of tightness as your wound heals. This is normal and will gradually subside.    Your healed wound may be sensitive to temperature changes. This sensitivity improves with time, but if you re having a lot of discomfort, try to avoid temperature extremes.    Patients frequently experience itching after their wound appears to have healed because of the continue healing under the skin.  Plain Vaseline will help relieve the itching.          TT: 20 minutes.  CT: 15 minutes.    The information in this document, created by the medical scribe for me, accurately reflects the services I personally performed and the decisions made by me. I have reviewed and approved this document for accuracy prior to leaving the patient care area.  May 15,  2019 9:53 AM  Margie Anna MD  [facility]    Again, thank you for allowing me to participate in the care of your patient.        Sincerely,        Margie Anna MD

## 2019-05-15 NOTE — PATIENT INSTRUCTIONS
"FUTURE APPOINTMENTS  Follow up per pathology report.  Follow up in July 2019 for a head and neck skin cancer screening.  Follow up in April 2020 for a full-body skin cancer screening.    WOUND CARE INSTRUCTIONS  1. Wash hands before every dressing change.  2. After 24 hours, change dressing daily.  3. Wash the wound area with a mild soap, then rinse.  4. Gently pat dry with a sterile gauze or Q-tip.  5. Using a Q-tip, apply Vaseline or Aquaphor only over entire wound. Do NOT use Neosporin - as many people react to neomycin.  6. Finally, cover with a bandage or sterile non-stick gauze with micropore paper tape.  7. Repeat once daily until wound has healed.      Soap, water and shampoo will not hurt this area.    Do not go swimming or take baths, but showering is encouraged.    Limit use of the area where the procedure was done for a few days to allow for optimal healing.    If you experience bleeding:  Wash hands and hold firm pressure on the area for 10 minutes without checking to see if the bleeding has stopped. \"Checking\" pulls off the protective wound clot and restarts the bleeding all over again. Re-apply pressure for 10 minutes if necessary to stop bleeding.  Use additional sterile gauze and tape to maintain pressure once bleeding has stopped.  If bleeding continues, then call back to clinic at [phone].    Signs of Infection:  Infection can occur in any area where skin has been disrupted.  If you notice persistent redness, swelling, colored drainage, increasing pain, fever or other signs of infection, please call us at: [phone] and ask to have me or my colleague paged. We will call you back to discuss.    Pathology Results:  You will be notified, generally via letter or MyChart, in approximately 10 days. If there is anything we need to discuss or further treatment needed, I will call you to discuss it.    PATIENT INFORMATION : WOUNDS  During the healing process you will notice a number of changes. " All wounds develop a small halo of redness surrounding the wound.  This means healing is occurring. Severe itching with extensive redness usually indicates sensitivity to the ointment or bandage tape used to dress the wound.  You should call our office if this develops.      Swelling  and/or discoloration around your surgical site is common, particularly when performed around the eye.    All wounds normally drain.  The larger the wound the more drainage there will be.  After 7-10 days, you will notice the wound beginning to shrink and new skin will begin to grow.  The wound is healed when you can see skin has formed over the entire area.  A healed wound has a healthy, shiny look to the surface and is red to dark pink in color to normalize.  Wounds may take approximately 4-6 weeks to heal.  Larger wounds may take 6-8 weeks. After the wound is healed you may discontinue dressing changes.    You may experience a sensation of tightness as your wound heals. This is normal and will gradually subside.    Your healed wound may be sensitive to temperature changes. This sensitivity improves with time, but if you re having a lot of discomfort, try to avoid temperature extremes.    Patients frequently experience itching after their wound appears to have healed because of the continue healing under the skin.  Plain Vaseline will help relieve the itching.

## 2019-05-15 NOTE — PROCEDURES
Name : Shave Excision  Indication : Excision of tissue for pathology evaluation.  Location(s) : Left ear, mid-helix: 4 mm in size smooth nodule. ? chondrodermatitis nodularis helicis ? Basal cell carcinoma ? other.  Completed by : Beverly Anna MD  Photo Taken : yes.  Anesthesia : Patient was anesthetized by infiltrating the area surrounding the lesion with 1% lidocaine.   epinephrine [id number] : Yes.  Note : Discussed the risk of pain, infection, scarring, hypo- or hyperpigmentation and recurrence or need for re-treatment. The benefits of treatment and alternative treatments were also discussed.    During this procedure, the universal protocol was utilized. The patient's identity was confirmed by no less than two patient identifiers, correct procedure was verified, correct site was verified and marked as applicable and a final pause was completed.    Sterile technique was used throughout the procedure. The skin was cleaned and prepped with surgical cleanser. Once adequate anesthesia was obtained, the lesion was removed with a deep scallop shave procedure. The specimen was sent to pathology.    Direct pressure and aluminum chloride and monopolar cautery was applied for hemostasis. No bleeding was present upon the completion of the procedure. The wound was coated with antibacterial ointment. A dry sterile dressing was applied. Patient tolerated the procedure well and left in satisfactory condition.    Primary provider and referring provider will be informed regarding the tissue report when it returns.    Name : Shave Excision  Indication : Excision of tissue for pathology evaluation.  Location(s) : Right temple: 6 mm x 4 mm in size scaly coarse lesion. ? Seborrheic keratosis ? Other.  Completed by : Beverly Anna MD  Photo Taken : yes.  Anesthesia : Patient was anesthetized by infiltrating the area surrounding the lesion with 1% lidocaine.   epinephrine [id number] : Yes.  Note : Discussed the risk of pain, infection,  scarring, hypo- or hyperpigmentation and recurrence or need for re-treatment. The benefits of treatment and alternative treatments were also discussed.    During this procedure, the universal protocol was utilized. The patient's identity was confirmed by no less than two patient identifiers, correct procedure was verified, correct site was verified and marked as applicable and a final pause was completed.    Sterile technique was used throughout the procedure. The skin was cleaned and prepped with surgical cleanser. Once adequate anesthesia was obtained, the lesion was removed with a deep scallop shave procedure. The specimen was sent to pathology.    Direct pressure and aluminum chloride and monopolar cautery was applied for hemostasis. No bleeding was present upon the completion of the procedure. The wound was coated with antibacterial ointment. A dry sterile dressing was applied. Patient tolerated the procedure well and left in satisfactory condition.    Primary provider and referring provider will be informed regarding the tissue report when it returns.

## 2019-05-20 ENCOUNTER — TELEPHONE (OUTPATIENT)
Dept: FAMILY MEDICINE | Facility: CLINIC | Age: 64
End: 2019-05-20

## 2019-05-20 LAB — COPATH REPORT: NORMAL

## 2019-05-20 NOTE — TELEPHONE ENCOUNTER
Wilfrido, Margie Montelongo MD  [facility] Skin Nurse Alex Carney,       Good news,  both lesions were benign ( not cancer ) . The lesion on the right temple was called a seborrheic keratosis. The lesion on the ear was a benign nodule.     Thank you for allowing me to be involved in your health care.   If you have any questions or concerns please feel free to contact me.   Margie Anna M.D.   [facility] [place]

## 2019-05-22 DIAGNOSIS — E11.9 TYPE 2 DIABETES MELLITUS WITHOUT COMPLICATION, WITHOUT LONG-TERM CURRENT USE OF INSULIN (H): ICD-10-CM

## 2019-05-22 NOTE — TELEPHONE ENCOUNTER
"lisinopril (PRINIVIL/ZESTRIL) 5 MG tablet  Last Written Prescription Date:  4/16/19  Last Fill Quantity: 30,  # refills: 0   Last office visit: 5/15/2019 with prescribing provider:  Davi   Future Office Visit:   Next 5 appointments (look out 90 days)    Jul 11, 2019  3:40 PM CDT  Return Visit with Margie Anna MD  [facility] [place] ([address] [address]  [phone]   Aug 08, 2019  2:00 PM CDT  [facility] Visit with Zak Moon MD  [facility] [address] [place] [id number] [phone]             Requested Prescriptions   Pending Prescriptions Disp Refills     lisinopril (PRINIVIL/ZESTRIL) 5 MG tablet [Pharmacy Med Name: LISINOPRIL 5MG TABS] 30 tablet 0     Sig: TAKE ONE TABLET BY MOUTH ONCE DAILY       ACE Inhibitors (Including Combos) Protocol Failed - 5/22/2019  6:22 AM        Failed - Normal serum creatinine on file in past 12 months     Recent Labs   Lab Test 04/16/19  0909   CR 0.62*             Passed - Blood pressure under 140/90 in past 12 months     BP Readings from Last 3 Encounters:   05/15/19 122/68   05/01/19 132/73   04/16/19 118/72                 Passed - Recent (12 mo) or future (30 days) visit within the authorizing provider's specialty     Patient had office visit in the last 12 months or has a visit in the next 30 days with authorizing provider or within the authorizing provider's specialty.  See \"Patient Info\" tab in inbasket, or \"Choose Columns\" in Meds & Orders section of the refill encounter.              Passed - Medication is active on med list        Passed - Patient is age 18 or older        Passed - Normal serum potassium on file in past 12 months     Recent Labs   Lab Test 04/16/19  0909   POTASSIUM 4.1               "

## 2019-05-23 ENCOUNTER — MYC REFILL (OUTPATIENT)
Dept: FAMILY MEDICINE | Facility: CLINIC | Age: 64
End: 2019-05-23

## 2019-05-23 DIAGNOSIS — E11.9 TYPE 2 DIABETES MELLITUS WITHOUT COMPLICATION, WITHOUT LONG-TERM CURRENT USE OF INSULIN (H): ICD-10-CM

## 2019-05-23 RX ORDER — LISINOPRIL 5 MG/1
TABLET ORAL
Qty: 90 TABLET | Refills: 0 | Status: SHIPPED | OUTPATIENT
Start: 2019-05-23 | End: 2019-09-14

## 2019-05-23 NOTE — TELEPHONE ENCOUNTER
Routing refill request to provider for review/approval because:  Labs out of range:  Creatinine    Please review and authorize if appropriate.    Thank you,   Ming GRAJEDA RN

## 2019-05-23 NOTE — TELEPHONE ENCOUNTER
"lisinopril (PRINIVIL/ZESTRIL) 5 MG tablet  Last Written Prescription Date:  5/23/19  Last Fill Quantity: 90,  # refills: 0   Last office visit: 5/15/2019 with prescribing provider:  dorene    Future Office Visit:   Next 5 appointments (look out 90 days)    Jul 11, 2019  3:40 PM CDT  Return Visit with Margie Anna MD  [facility] [place] ([address] [address]  [phone]   Aug 08, 2019  2:00 PM CDT  [facility] Visit with Zak Moon MD  [facility] [address] [place] [id number] [phone]             Requested Prescriptions   Pending Prescriptions Disp Refills     lisinopril (PRINIVIL/ZESTRIL) 5 MG tablet 30 tablet 0     Sig: Take 1 tablet (5 mg) by mouth daily       ACE Inhibitors (Including Combos) Protocol Failed - 5/23/2019  5:33 AM        Failed - Normal serum creatinine on file in past 12 months     Recent Labs   Lab Test 04/16/19  0909   CR 0.62*             Passed - Blood pressure under 140/90 in past 12 months     BP Readings from Last 3 Encounters:   05/15/19 122/68   05/01/19 132/73   04/16/19 118/72                 Passed - Recent (12 mo) or future (30 days) visit within the authorizing provider's specialty     Patient had office visit in the last 12 months or has a visit in the next 30 days with authorizing provider or within the authorizing provider's specialty.  See \"Patient Info\" tab in inbasket, or \"Choose Columns\" in Meds & Orders section of the refill encounter.              Passed - Medication is active on med list        Passed - Patient is age 18 or older        Passed - Normal serum potassium on file in past 12 months     Recent Labs   Lab Test 04/16/19  0909   POTASSIUM 4.1               "

## 2019-05-24 RX ORDER — LISINOPRIL 5 MG/1
5 TABLET ORAL DAILY
Qty: 30 TABLET | Refills: 0 | Status: SHIPPED | OUTPATIENT
Start: 2019-05-24 | End: 2020-03-16

## 2019-07-11 ENCOUNTER — OFFICE VISIT (OUTPATIENT)
Dept: FAMILY MEDICINE | Facility: CLINIC | Age: 64
End: 2019-07-11
Payer: COMMERCIAL

## 2019-07-11 VITALS — DIASTOLIC BLOOD PRESSURE: 60 MMHG | SYSTOLIC BLOOD PRESSURE: 138 MMHG

## 2019-07-11 DIAGNOSIS — L57.0 AK (ACTINIC KERATOSIS): ICD-10-CM

## 2019-07-11 DIAGNOSIS — L82.1 SEBORRHEIC KERATOSES: ICD-10-CM

## 2019-07-11 DIAGNOSIS — Z85.89 HISTORY OF SQUAMOUS CELL CARCINOMA: Primary | ICD-10-CM

## 2019-07-11 PROCEDURE — 99213 OFFICE O/P EST LOW 20 MIN: CPT | Mod: 25 | Performed by: FAMILY MEDICINE

## 2019-07-11 PROCEDURE — 17000 DESTRUCT PREMALG LESION: CPT | Performed by: FAMILY MEDICINE

## 2019-07-11 PROCEDURE — 17003 DESTRUCT PREMALG LES 2-14: CPT | Performed by: FAMILY MEDICINE

## 2019-07-11 NOTE — LETTER
"    7/11/2019         RE: Rommel Bryan  [address] [facility] [id number]        Dear Colleague,    Thank you for referring your patient, Rommel Bryan, to the [facility] [place]. Please see a copy of my visit note below.    [facility] - PRIMARY CARE SKIN    CC: skin cancer screening (head & neck)  SUBJECTIVE:   Rommel Bryan is a(n) 64 year old male who presents to clinic today for a skin cancer screening of the head and neck.     Bothersome lesions noticed by the patient or other skin concerns :  Issue One: There may be a spot on the left temple that is not resolving.    He continues to feel nodes on the left ear. 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    INTEGUMENTARY/SKIN: POSITIVE for changing lesions  ROS: 14 point review of systems was negative except the symptoms listed above in the HPI.    This document serves as a record of the services and decisions personally performed and made by Margie Anna MD and was created by Dino Reaves, a trained medical scribe, based on personal observations and provider statements to the medical scribe.  July 11, 2019 3:29 PM   Dino Reaves    OBJECTIVE:   GENERAL: healthy, alert and no distress.  SKIN: [place] Skin Type - III.  Scalp, Face, Neck examined. The dermatoscope was used to help evaluate pigmented lesions.  Skin Pertinent Findings:  Scalp, face: Multiple brown, macule(s) most consistent with benign solar lentigo.    Face: Some stuck-on appearing papules, raised, brown, " coarse-textured, round lesion(s) most consistent with seborrheic keratoses    Significant Findings:  Left ear: well-healed scar     Scalp, face: 3-5 mm in size, erythematous, scaly, non-indurated lesion(s) most consistent with actinic keratoses.  Name: Liquid Nitrogen Cry-Ac Cryotherapy.  Indication: Pre-malignant lesion.  Location(s): vertex of scalp - x2; left lateral forehead - x1; right lateral forehead - x1, right upper forehead - x1.  Completed by: Beverly Anna MD.  Note : Discussed natural history of lesion and treatment options. Prior to treatment, we discussed inflammation, tenderness post-procedure, the healing process, and the risks of pain, infection, scarring, blistering, and hypo-/hyperpigmentation after healing. Explained that these lesions may grow back and may need additional treatment or re-treatment. The patient expressed a desire to proceed with cryotherapy.    The lesion(s) was treated with liquid nitrogen Cry-Ac, five second freeze repeated twice with a pause to allow for the area to thaw.    Patient tolerated the procedure well and left in good condition. If this lesion should persist or recur, then it needs to be re-evaluated.  Total number of lesions treated: 3.    ASSESSMENT:     Encounter Diagnoses   Name Primary?     History of squamous cell carcinoma Yes     AK (actinic keratosis)      Seborrheic keratoses        PLAN:   Patient Instructions   FUTURE APPOINTMENTS  Follow up in 6 months for a head and neck skin cancer screening.  Follow up in April 2020 for a full-body skin cancer screening.    SCAR MITIGATION   Gently massage the scar(s) once daily to loosen collagen.    ACTINIC KERATOSES POST-TREATMENT CARE INSTRUCTIONS  Actinic keratoses are benign, scaly or gritty lesions that appear in sun-exposed areas and may progress to skin cancers. For this reason, it is important to treat them before they become cancerous. Liquid nitrogen is the most commonly used and most effective treatment  for actinic keratoses; it is mildly uncomfortable when applied to the skin, but the discomfort rapidly subsides.    Post-Treatment:  You may experience burning and/or stinging immediately following the procedure. The discomfort from the procedure may persist over the next 12-24 hours. The area treated will look pinker and slightly swollen before the healing process begins. You may also notice redness, swelling, tenderness, weeping and crusts or scabs. Healing time is approximately 10-14 days.    Blister - You may or may notice blistering from the freezing. If you develop an uncomfortable blister from today's treatment, you may gently puncture this with a needle that has been cleaned with alcohol. However, do not remove the protective skin layer of the blister.    Scab - After a few days, you may notice scaliness or scab formation. Do not pick at the scabs because this may cause slower healing and a permanent scar.    The skin may appear temporarily darker at the treatment site, but this usually fades over a period of months, provided that the area is protected from the sun.    Care of the areas treated:    Wash the area with a mild cleanser.    Gently pat dry.    Do not rub.     Keep protected from the sun during the healing process and for a full year following treatment as the skin continues to remodel during this time.    Apply Vaseline or Aquaphor ointment sparingly to the site for the first 7 days after treatment.    Do not use Neosporin, as many people eventually develop a medication allergy, that can easily be confused with an infection, to Neomycin.    Return if:  There should not be any residual scaling. If there is any concern that the lesion has persisted after 4-6 weeks, make an appointment for a re-check. Healing time does vary depending on your individual healing process and the area of the body treated. Most patients will be healed in one month.    Signs of Infection:  Thankfully this is rare. However  "if you notice persistent colored drainage, increasing pain, fever or other signs of infection, please call us at: [phone]    SUN PROTECTION INSTRUCTIONS  Sun damage can lead to skin cancer and premature aging of the skin.      The best way to protect from sun damage to your skin is to avoid the sun during peak hours (10 am - 2 pm) even on overcast days.    Never use tanning beds. Tanning beds are associated with much higher risks of skin cancer.    All tanning damages the skin. Aim for ivory skin year round and you will have less trouble with your skin in years to come. There is no merit in getting \"a base tan\" before a warm weather vacation, as any tanning indicates your body's response to sun damage.    Stop smoking. Smokers have higher rates of skin cancer and also have premature skin wrinkling.    Use UPF sun-protective clothing, which while more expensive initially provides longer lasting coverage without having to worry about remembering to re-apply.  1. Wear a wide-brimmed hat and sunglasses.   2. Wear sun-protective clothing.  [employer] and other [employer] make sun protective clothing that are stylish, comfortable and cool.   [employer] and other [employer] make UV arm sleeves suitable for golfing, gardening and other activities.    Sunscreen instructions:  1. Use sunscreens with Zinc Oxide, Titanium Dioxide or Avobenzone to protect from UVA rays.  2. Use SPF 30-50+ to protect from UVB rays.  3. Re-apply every 2 hours even if water resistant.  4. Apply on your face every day even when cloudy and even in the winter. UVA \"aging rays\" penetrate window glass and are just as strong in the winter as in the summer.    FYI  You should use about 3 tablespoons of sunscreen to protect your whole body. Thus a typical eight ounce bottle of sunscreen should last 4 applications. Remember, that the SPF rating is compromised if you don t apply enough. Most people only apply 1/2 - 1/3 of the amount they " need. Also don t forget areas such as your ears, feet, upper back and harder to reach places. Keep in mind that these amounts should be increased for larger body sizes.    Sunscreens with titanium dioxide and/or zinc oxide in the active ingredients are physical blockers as opposed to chemical blockers. Chemical-free sunscreens should not irritate the skin.    Spray-on sunscreens may be used for touch-up application only, not as a base layer. Also, use with caution around small children due to inhalation risk.    SPF means sun protection factor, which is just the degree to which the sunscreen can protect against UVB rays. There is no rating system for UVA rays. SPF is calculated as the time skin will burn when sunscreen is applied vs. skin without sunscreen.    Water resistant sunscreens should be re-applied every 1-2 hours.    Product Recommendations:    Consider use of sunscreen sticks with Zinc Oxide and Titanium Dioxide active ingredients such as Neutrogena Pure&Free Baby Sunscreen Stick.    Good examples include: Blue Lizard, EltaMD, Solbar    Good daily moisturizers with SPF: Vanicream, CeraVe.    For sensitive skin, consider : SkinMedica Essential Defense Mineral Shield Broad Spectrum SPF 35    Men: consider use of Neutrogena Triple Protect Facial Lotion    Avoid retinyl palmitate products.  Avoid combination products that include both sunscreen and insect repellant, as sunscreen should be applied every 2 hours, but insect repellant should not be applied as frequently.    For more information:  https://www.skincancer.org/prevention/sun-protection/sunscreen/sunscreens-safe-and-effective    SKIN CANCER SELF-EXAM INSTRUCTIONS  Check every month in the mirror or with a household member. Be aware of any changes, especially bleeding or tenderness. Also, make sure to check your nails for color changes after removal of nail polish.    For melanoma, check for:  A - Asymmetry. One half unlike the other half.  B -  Border. Irregular, scalloped, ragged, notched, blurred or poorly defined borders.  C - Color. Color variations from one area to another, with shades of tan, brown and/or black present. Sometimes white, red or blue.  D - Diameter. Greater than 6 mm (about the size of a pencil eraser). Any new growth of a mole should be concerning and be evaluated.  E - Evolving. A mole or skin lesion that looks different from the rest or is changing in size, shape or color.    For basal cell carcinoma and squamous cell carcinoma, check for:    Sores, shiny bumps, nodules, scaly lesions, or wart-like growths that are itchy, tender, crusting, scabbing, eroding, oozing or bleeding.    Open sores/wounds or reddish/irritated areas that do not heal within 2-3 weeks.    Scar-like areas that are white, yellow or waxy in color.      The patient was counseled about sunscreens and sun avoidance. The patient was counseled to check the skin regularly and report any lesion that is new, changing, itching, scabbing, bleeding or otherwise bothersome. The patient was discharged ambulatory and in stable condition.    TT: 20 minutes.  CT: 15 minutes.    The information in this document, created by the medical scribe for me, accurately reflects the services I personally performed and the decisions made by me. I have reviewed and approved this document for accuracy prior to leaving the patient care area.  July 11, 2019 3:29 PM  Margie Anna MD  [facility] [place]    Again, thank you for allowing me to participate in the care of your patient.        Sincerely,        Margie Anna MD

## 2019-07-11 NOTE — PATIENT INSTRUCTIONS
FUTURE APPOINTMENTS  Follow up in 6 months for a head and neck skin cancer screening.  Follow up in April 2020 for a full-body skin cancer screening.    SCAR MITIGATION   Gently massage the scar(s) once daily to loosen collagen.    ACTINIC KERATOSES POST-TREATMENT CARE INSTRUCTIONS  Actinic keratoses are benign, scaly or gritty lesions that appear in sun-exposed areas and may progress to skin cancers. For this reason, it is important to treat them before they become cancerous. Liquid nitrogen is the most commonly used and most effective treatment for actinic keratoses; it is mildly uncomfortable when applied to the skin, but the discomfort rapidly subsides.    Post-Treatment:  You may experience burning and/or stinging immediately following the procedure. The discomfort from the procedure may persist over the next 12-24 hours. The area treated will look pinker and slightly swollen before the healing process begins. You may also notice redness, swelling, tenderness, weeping and crusts or scabs. Healing time is approximately 10-14 days.    Blister - You may or may notice blistering from the freezing. If you develop an uncomfortable blister from today's treatment, you may gently puncture this with a needle that has been cleaned with alcohol. However, do not remove the protective skin layer of the blister.    Scab - After a few days, you may notice scaliness or scab formation. Do not pick at the scabs because this may cause slower healing and a permanent scar.    The skin may appear temporarily darker at the treatment site, but this usually fades over a period of months, provided that the area is protected from the sun.    Care of the areas treated:    Wash the area with a mild cleanser.    Gently pat dry.    Do not rub.     Keep protected from the sun during the healing process and for a full year following treatment as the skin continues to remodel during this time.    Apply Vaseline or Aquaphor ointment sparingly to  "the site for the first 7 days after treatment.    Do not use Neosporin, as many people eventually develop a medication allergy, that can easily be confused with an infection, to Neomycin.    Return if:  There should not be any residual scaling. If there is any concern that the lesion has persisted after 4-6 weeks, make an appointment for a re-check. Healing time does vary depending on your individual healing process and the area of the body treated. Most patients will be healed in one month.    Signs of Infection:  Thankfully this is rare. However if you notice persistent colored drainage, increasing pain, fever or other signs of infection, please call us at: [phone]    SUN PROTECTION INSTRUCTIONS  Sun damage can lead to skin cancer and premature aging of the skin.      The best way to protect from sun damage to your skin is to avoid the sun during peak hours (10 am - 2 pm) even on overcast days.    Never use tanning beds. Tanning beds are associated with much higher risks of skin cancer.    All tanning damages the skin. Aim for ivory skin year round and you will have less trouble with your skin in years to come. There is no merit in getting \"a base tan\" before a warm weather vacation, as any tanning indicates your body's response to sun damage.    Stop smoking. Smokers have higher rates of skin cancer and also have premature skin wrinkling.    Use UPF sun-protective clothing, which while more expensive initially provides longer lasting coverage without having to worry about remembering to re-apply.  1. Wear a wide-brimmed hat and sunglasses.   2. Wear sun-protective clothing.  [employer] and other [employer] make sun protective clothing that are stylish, comfortable and cool.   [employer] and other [employer] make UV arm sleeves suitable for golfing, gardening and other activities.    Sunscreen instructions:  1. Use sunscreens with Zinc Oxide, Titanium Dioxide or Avobenzone to protect from UVA " "rays.  2. Use SPF 30-50+ to protect from UVB rays.  3. Re-apply every 2 hours even if water resistant.  4. Apply on your face every day even when cloudy and even in the winter. UVA \"aging rays\" penetrate window glass and are just as strong in the winter as in the summer.    FYI  You should use about 3 tablespoons of sunscreen to protect your whole body. Thus a typical eight ounce bottle of sunscreen should last 4 applications. Remember, that the SPF rating is compromised if you don t apply enough. Most people only apply 1/2 - 1/3 of the amount they need. Also don t forget areas such as your ears, feet, upper back and harder to reach places. Keep in mind that these amounts should be increased for larger body sizes.    Sunscreens with titanium dioxide and/or zinc oxide in the active ingredients are physical blockers as opposed to chemical blockers. Chemical-free sunscreens should not irritate the skin.    Spray-on sunscreens may be used for touch-up application only, not as a base layer. Also, use with caution around small children due to inhalation risk.    SPF means sun protection factor, which is just the degree to which the sunscreen can protect against UVB rays. There is no rating system for UVA rays. SPF is calculated as the time skin will burn when sunscreen is applied vs. skin without sunscreen.    Water resistant sunscreens should be re-applied every 1-2 hours.    Product Recommendations:    Consider use of sunscreen sticks with Zinc Oxide and Titanium Dioxide active ingredients such as Neutrogena Pure&Free Baby Sunscreen Stick.    Good examples include: Blue [place], EltaMD, Solbar    Good daily moisturizers with SPF: Vanicream, CeraVe.    For sensitive skin, consider : SkinMedica Essential Defense Mineral Shield Broad Spectrum SPF 35    Men: consider use of Neutrogena Triple Protect Facial Lotion    Avoid retinyl palmitate products.  Avoid combination products that include both sunscreen and insect " repellant, as sunscreen should be applied every 2 hours, but insect repellant should not be applied as frequently.    For more information:  https://www.skincancer.org/prevention/sun-protection/sunscreen/sunscreens-safe-and-effective    SKIN CANCER SELF-EXAM INSTRUCTIONS  Check every month in the mirror or with a household member. Be aware of any changes, especially bleeding or tenderness. Also, make sure to check your nails for color changes after removal of nail polish.    For melanoma, check for:  A - Asymmetry. One half unlike the other half.  B - Border. Irregular, scalloped, ragged, notched, blurred or poorly defined borders.  C - Color. Color variations from one area to another, with shades of tan, brown and/or black present. Sometimes white, red or blue.  D - Diameter. Greater than 6 mm (about the size of a pencil eraser). Any new growth of a mole should be concerning and be evaluated.  E - Evolving. A mole or skin lesion that looks different from the rest or is changing in size, shape or color.    For basal cell carcinoma and squamous cell carcinoma, check for:    Sores, shiny bumps, nodules, scaly lesions, or wart-like growths that are itchy, tender, crusting, scabbing, eroding, oozing or bleeding.    Open sores/wounds or reddish/irritated areas that do not heal within 2-3 weeks.    Scar-like areas that are white, yellow or waxy in color.

## 2019-07-11 NOTE — PROGRESS NOTES
"[facility] - PRIMARY CARE SKIN    CC: skin cancer screening (head & neck)  SUBJECTIVE:   Rommel Bryan is a(n) 64 year old male who presents to clinic today for a skin cancer screening of the head and neck.     Bothersome lesions noticed by the patient or other skin concerns :  Issue One: There may be a spot on the left temple that is not resolving.    He continues to feel nodes on the left ear. 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    INTEGUMENTARY/SKIN: POSITIVE for changing lesions  ROS: 14 point review of systems was negative except the symptoms listed above in the HPI.    This document serves as a record of the services and decisions personally performed and made by Margie Anna MD and was created by Dino Reaves, a trained medical scribe, based on personal observations and provider statements to the medical scribe.  July 11, 2019 3:29 PM   Dino Reaves    OBJECTIVE:   GENERAL: healthy, alert and no distress.  SKIN: [place] Skin Type - III.  Scalp, Face, Neck examined. The dermatoscope was used to help evaluate pigmented lesions.  Skin Pertinent Findings:  Scalp, face: Multiple brown, macule(s) most consistent with benign solar lentigo.    Face: Some stuck-on appearing papules, raised, brown, coarse-textured, round lesion(s) most consistent with seborrheic keratoses    Significant Findings:  Left ear: well-healed scar     Scalp, face: 3-5 mm in size, erythematous, scaly, non-indurated lesion(s) most consistent with actinic keratoses.  Name: Liquid Nitrogen Cry-Vipin " Cryotherapy.  Indication: Pre-malignant lesion.  Location(s): vertex of scalp - x2; left lateral forehead - x1; right lateral forehead - x1, right upper forehead - x1.  Completed by: Beverly Anna MD.  Note : Discussed natural history of lesion and treatment options. Prior to treatment, we discussed inflammation, tenderness post-procedure, the healing process, and the risks of pain, infection, scarring, blistering, and hypo-/hyperpigmentation after healing. Explained that these lesions may grow back and may need additional treatment or re-treatment. The patient expressed a desire to proceed with cryotherapy.    The lesion(s) was treated with liquid nitrogen Cry-Ac, five second freeze repeated twice with a pause to allow for the area to thaw.    Patient tolerated the procedure well and left in good condition. If this lesion should persist or recur, then it needs to be re-evaluated.  Total number of lesions treated: 3.    ASSESSMENT:     Encounter Diagnoses   Name Primary?     History of squamous cell carcinoma Yes     AK (actinic keratosis)      Seborrheic keratoses        PLAN:   Patient Instructions   FUTURE APPOINTMENTS  Follow up in 6 months for a head and neck skin cancer screening.  Follow up in April 2020 for a full-body skin cancer screening.    SCAR MITIGATION   Gently massage the scar(s) once daily to loosen collagen.    ACTINIC KERATOSES POST-TREATMENT CARE INSTRUCTIONS  Actinic keratoses are benign, scaly or gritty lesions that appear in sun-exposed areas and may progress to skin cancers. For this reason, it is important to treat them before they become cancerous. Liquid nitrogen is the most commonly used and most effective treatment for actinic keratoses; it is mildly uncomfortable when applied to the skin, but the discomfort rapidly subsides.    Post-Treatment:  You may experience burning and/or stinging immediately following the procedure. The discomfort from the procedure may persist over the next  12-24 hours. The area treated will look pinker and slightly swollen before the healing process begins. You may also notice redness, swelling, tenderness, weeping and crusts or scabs. Healing time is approximately 10-14 days.    Blister - You may or may notice blistering from the freezing. If you develop an uncomfortable blister from today's treatment, you may gently puncture this with a needle that has been cleaned with alcohol. However, do not remove the protective skin layer of the blister.    Scab - After a few days, you may notice scaliness or scab formation. Do not pick at the scabs because this may cause slower healing and a permanent scar.    The skin may appear temporarily darker at the treatment site, but this usually fades over a period of months, provided that the area is protected from the sun.    Care of the areas treated:    Wash the area with a mild cleanser.    Gently pat dry.    Do not rub.     Keep protected from the sun during the healing process and for a full year following treatment as the skin continues to remodel during this time.    Apply Vaseline or Aquaphor ointment sparingly to the site for the first 7 days after treatment.    Do not use Neosporin, as many people eventually develop a medication allergy, that can easily be confused with an infection, to Neomycin.    Return if:  There should not be any residual scaling. If there is any concern that the lesion has persisted after 4-6 weeks, make an appointment for a re-check. Healing time does vary depending on your individual healing process and the area of the body treated. Most patients will be healed in one month.    Signs of Infection:  Thankfully this is rare. However if you notice persistent colored drainage, increasing pain, fever or other signs of infection, please call us at: [phone]    SUN PROTECTION INSTRUCTIONS  Sun damage can lead to skin cancer and premature aging of the skin.      The best way to protect from sun  "damage to your skin is to avoid the sun during peak hours (10 am - 2 pm) even on overcast days.    Never use tanning beds. Tanning beds are associated with much higher risks of skin cancer.    All tanning damages the skin. Aim for ivory skin year round and you will have less trouble with your skin in years to come. There is no merit in getting \"a base tan\" before a warm weather vacation, as any tanning indicates your body's response to sun damage.    Stop smoking. Smokers have higher rates of skin cancer and also have premature skin wrinkling.    Use UPF sun-protective clothing, which while more expensive initially provides longer lasting coverage without having to worry about remembering to re-apply.  1. Wear a wide-brimmed hat and sunglasses.   2. Wear sun-protective clothing.  Fed Playbook and other [employer] make sun protective clothing that are stylish, comfortable and cool.   JDF and other [employer] make UV arm sleeves suitable for golfing, gardening and other activities.    Sunscreen instructions:  1. Use sunscreens with Zinc Oxide, Titanium Dioxide or Avobenzone to protect from UVA rays.  2. Use SPF 30-50+ to protect from UVB rays.  3. Re-apply every 2 hours even if water resistant.  4. Apply on your face every day even when cloudy and even in the winter. UVA \"aging rays\" penetrate window glass and are just as strong in the winter as in the summer.    FYI  You should use about 3 tablespoons of sunscreen to protect your whole body. Thus a typical eight ounce bottle of sunscreen should last 4 applications. Remember, that the SPF rating is compromised if you don t apply enough. Most people only apply 1/2 - 1/3 of the amount they need. Also don t forget areas such as your ears, feet, upper back and harder to reach places. Keep in mind that these amounts should be increased for larger body sizes.    Sunscreens with titanium dioxide and/or zinc oxide in the active ingredients are physical blockers " as opposed to chemical blockers. Chemical-free sunscreens should not irritate the skin.    Spray-on sunscreens may be used for touch-up application only, not as a base layer. Also, use with caution around small children due to inhalation risk.    SPF means sun protection factor, which is just the degree to which the sunscreen can protect against UVB rays. There is no rating system for UVA rays. SPF is calculated as the time skin will burn when sunscreen is applied vs. skin without sunscreen.    Water resistant sunscreens should be re-applied every 1-2 hours.    Product Recommendations:    Consider use of sunscreen sticks with Zinc Oxide and Titanium Dioxide active ingredients such as Neutrogena Pure&Free Baby Sunscreen Stick.    Good examples include: Blue Lizard, EltaMD, Solbar    Good daily moisturizers with SPF: Vanicream, CeraVe.    For sensitive skin, consider : SkinMedica Essential Defense Mineral Shield Broad Spectrum SPF 35    Men: consider use of Neutrogena Triple Protect Facial Lotion    Avoid retinyl palmitate products.  Avoid combination products that include both sunscreen and insect repellant, as sunscreen should be applied every 2 hours, but insect repellant should not be applied as frequently.    For more information:  https://www.skincancer.org/prevention/sun-protection/sunscreen/sunscreens-safe-and-effective    SKIN CANCER SELF-EXAM INSTRUCTIONS  Check every month in the mirror or with a household member. Be aware of any changes, especially bleeding or tenderness. Also, make sure to check your nails for color changes after removal of nail polish.    For melanoma, check for:  A - Asymmetry. One half unlike the other half.  B - Border. Irregular, scalloped, ragged, notched, blurred or poorly defined borders.  C - Color. Color variations from one area to another, with shades of tan, brown and/or black present. Sometimes white, red or blue.  D - Diameter. Greater than 6 mm (about the size of a pencil  eraser). Any new growth of a mole should be concerning and be evaluated.  E - Evolving. A mole or skin lesion that looks different from the rest or is changing in size, shape or color.    For basal cell carcinoma and squamous cell carcinoma, check for:    Sores, shiny bumps, nodules, scaly lesions, or wart-like growths that are itchy, tender, crusting, scabbing, eroding, oozing or bleeding.    Open sores/wounds or reddish/irritated areas that do not heal within 2-3 weeks.    Scar-like areas that are white, yellow or waxy in color.      The patient was counseled about sunscreens and sun avoidance. The patient was counseled to check the skin regularly and report any lesion that is new, changing, itching, scabbing, bleeding or otherwise bothersome. The patient was discharged ambulatory and in stable condition.    TT: 20 minutes.  CT: 15 minutes.    The information in this document, created by the medical scribe for me, accurately reflects the services I personally performed and the decisions made by me. I have reviewed and approved this document for accuracy prior to leaving the patient care area.  July 11, 2019 3:29 PM  Margie Anna MD  [facility] [place]

## 2019-08-08 ENCOUNTER — OFFICE VISIT (OUTPATIENT)
Dept: DERMATOLOGY | Facility: CLINIC | Age: 64
End: 2019-08-08
Payer: COMMERCIAL

## 2019-08-08 VITALS — DIASTOLIC BLOOD PRESSURE: 68 MMHG | SYSTOLIC BLOOD PRESSURE: 118 MMHG | OXYGEN SATURATION: 94 % | HEART RATE: 70 BPM

## 2019-08-08 DIAGNOSIS — Z85.828 HISTORY OF SKIN CANCER: Primary | ICD-10-CM

## 2019-08-08 PROCEDURE — 99212 OFFICE O/P EST SF 10 MIN: CPT | Performed by: DERMATOLOGY

## 2019-08-08 NOTE — LETTER
2019         RE: Rommel Bryan  [address] [address]        Dear Colleague,    Thank you for referring your patient, Rommel Bryan, to the [facility]. Please see a copy of my visit note below.    Rommel Bryan is a 64 year old year old male patient here today for hx of non-melanoma skin cancer onr helix.  THis has healed well.  He notes there is some pull on ear. Patient reports the following modifying factors none.  Associated symptoms: none.  Patient has no other skin complaints today.  Remainder of the HPI, Meds, PMH, Allergies, FH, and SH was reviewed in chart.      Past Medical History:   Diagnosis Date     Asthma      Family history of coronary artery disease 2013     HTN (hypertension)      Hypercholesterolemia      Hyperlipidemia LDL goal <130 2013     Moderate persistent asthma 2013     Obesity, Class III, BMI 40-49.9 (morbid obesity) (H) 2013     LYRIC (obstructive sleep apnea)      Squamous cell carcinoma      Transaminasemia 2013       Past Surgical History:   Procedure Laterality Date     COLONOSCOPY  3/11/14     COLONOSCOPY  3/11/2014    Procedure: COLONOSCOPY;  colonoscopy;  Surgeon: Alirio Mao MD;  Location:  GI     REPAIR NERVE PRIMARY PERIPHERAL  2013        Family History   Problem Relation Age of Onset     Neurologic Disorder Mother         alzheimer      Diabetes Mother      Hypertension Mother      Cardiovascular Father         chf      Prostate Cancer Maternal Uncle      Asthma No family hx of      Cancer - colorectal No family hx of      Anesthesia Reaction No family hx of      Blood Disease No family hx of      Eye Disorder No family hx of        Social History     Socioeconomic History     Marital status:      Spouse name: Not on file     Number of children: Not on file     Years of education: Not on file     Highest education level: Not on file    Occupational History     Occupation:  for [employer]     Employer: [employer]   Social Needs     Financial resource strain: Not on file     Food insecurity:     Worry: Not on file     Inability: Not on file     Transportation needs:     Medical: Not on file     Non-medical: Not on file   Tobacco Use     Smoking status: Never Smoker     Smokeless tobacco: Never Used   Substance and Sexual Activity     Alcohol use: No     Comment: maybe 3 glasses a year      Drug use: No     Sexual activity: Not Currently   Lifestyle     Physical activity:     Days per week: Not on file     Minutes per session: Not on file     Stress: Not on file   Relationships     Social connections:     Talks on phone: Not on file     Gets together: Not on file     Attends Mandaen service: Not on file     Active member of club or organization: Not on file     Attends meetings of clubs or organizations: Not on file     Relationship status: Not on file     Intimate partner violence:     Fear of current or ex partner: Not on file     Emotionally abused: Not on file     Physically abused: Not on file     Forced sexual activity: Not on file   Other Topics Concern     Parent/sibling w/ CABG, MI or angioplasty before 65F 55M? Not Asked      Service No     Blood Transfusions No     Caffeine Concern Not Asked     Occupational Exposure Not Asked     Hobby Hazards Not Asked     Sleep Concern Not Asked     Stress Concern Not Asked     Weight Concern Not Asked     Special Diet Not Asked     Back Care Not Asked     Exercise No     Bike Helmet Not Asked     Seat Belt Yes     Self-Exams Not Asked   Social History Narrative    Eats fruits and vegetables every day. He has at least 3 servings of dairy per day. Vitamin D supplement recommended.       Outpatient Encounter Medications as of 8/8/2019   Medication Sig Dispense Refill     albuterol (PROAIR HFA, PROVENTIL HFA, VENTOLIN HFA) 108 (90 BASE) MCG/ACT inhaler Inhale 2 puffs  into the lungs every 6 hours as needed for shortness of breath / dyspnea 1 Inhaler 1     aspirin 81 MG tablet Take 1 tablet by mouth daily.       atorvastatin (LIPITOR) 40 MG tablet Take 1 tablet (40 mg) by mouth daily 90 tablet 3     blood glucose (NO BRAND SPECIFIED) test strip Use to test blood sugars bid times daily or as directed 200 strip 3     blood glucose monitoring (NO BRAND SPECIFIED) meter device kit Use to test blood sugar bidtimes daily or as directed. 1 kit 3     fluticasone (FLOVENT DISKUS) 100 MCG/BLIST inhaler Inhale 1 puff into the lungs 2 times daily 3 Inhaler 1     glipiZIDE (GLUCOTROL) 5 MG tablet TAKE ONE TABLET BY MOUTH EVERY MORNING BEFORE BREAKFAST 90 tablet 3     lisinopril (PRINIVIL/ZESTRIL) 5 MG tablet Take 1 tablet (5 mg) by mouth daily 30 tablet 0     lisinopril (PRINIVIL/ZESTRIL) 5 MG tablet TAKE ONE TABLET BY MOUTH ONCE DAILY 90 tablet 0     metFORMIN (GLUCOPHAGE) 500 MG tablet Take 1 tablet (500 mg) by mouth daily (with dinner) 60 tablet 3     order for DME Equipment being ordered: CPAP mask only 1 each 0     order for DME Equipment being ordered: CPAP mask   full face mask (CPT code: a7030) and head gear (CPT code: ). 1 each 0     No facility-administered encounter medications on file as of 8/8/2019.              Review Of Systems  Skin: As above  Eyes: negative  Ears/Nose/Throat: negative  Respiratory: No shortness of breath, dyspnea on exertion, cough, or hemoptysis  Cardiovascular: negative  Gastrointestinal: negative  Genitourinary: negative  Musculoskeletal: negative  Neurologic: negative  Psychiatric: negative  Hematologic/Lymphatic/Immunologic: negative  Endocrine: negative      O:   NAD, WDWN, Alert & Oriented, Mood & Affect wnl, Vitals stable   Here today alone   There were no vitals taken for this visit.   General appearance normal   Vitals stable   Alert, oriented and in no acute distress     R helix well helaed slight pull on sup helix      Eyes:  Conjunctivae/lids:Normal     ENT: Lips, buccal mucosa, tongue: normal    MSK:Normal    Cardiovascular: peripheral edema none    Pulm: Breathing Normal    Neuro/Psych: Orientation:Normal; Mood/Affect:Normal      A/P:  1. Hx of squamous cell carcinoma  Ear healed well  z plasty for pull on ear discussed with patient   He would like to wait  Cont massge of scar  Return to clinic 6 months  F/u Dr. Anna for skin checks      Again, thank you for allowing me to participate in the care of your patient.        Sincerely,        Zak Moon MD

## 2019-08-08 NOTE — NURSING NOTE
"Initial /68   Pulse 70   SpO2 94%  Estimated body mass index is 39.56 kg/m  as calculated from the following:    Height as of 4/16/19: 1.74 m (5' 8.5\").    Weight as of 4/16/19: 119.7 kg (264 lb). .    COLTEN Cortés-BSN-N  [facility]  [phone]  "

## 2019-08-08 NOTE — PROGRESS NOTES
Rommel Bryan is a 64 year old year old male patient here today for hx of non-melanoma skin cancer onr helix.  THis has healed well.  He notes there is some pull on ear. Patient reports the following modifying factors none.  Associated symptoms: none.  Patient has no other skin complaints today.  Remainder of the HPI, Meds, PMH, Allergies, FH, and SH was reviewed in chart.      Past Medical History:   Diagnosis Date     Asthma      Family history of coronary artery disease 2013     HTN (hypertension)      Hypercholesterolemia      Hyperlipidemia LDL goal <130 2013     Moderate persistent asthma 2013     Obesity, Class III, BMI 40-49.9 (morbid obesity) (H) 2013     LYRIC (obstructive sleep apnea)      Squamous cell carcinoma      Transaminasemia 2013       Past Surgical History:   Procedure Laterality Date     COLONOSCOPY  3/11/14     COLONOSCOPY  3/11/2014    Procedure: COLONOSCOPY;  colonoscopy;  Surgeon: Alirio Mao MD;  Location: [facility]     REPAIR NERVE PRIMARY PERIPHERAL  2013        Family History   Problem Relation Age of Onset     Neurologic Disorder Mother         alzheimer      Diabetes Mother      Hypertension Mother      Cardiovascular Father         chf      Prostate Cancer Maternal Uncle      Asthma No family hx of      Cancer - colorectal No family hx of      Anesthesia Reaction No family hx of      Blood Disease No family hx of      Eye Disorder No family hx of        Social History     Socioeconomic History     Marital status:      Spouse name: Not on file     Number of children: Not on file     Years of education: Not on file     Highest education level: Not on file   Occupational History     Occupation:  for [employer]     Employer: [employer]   Social Needs     Financial resource strain: Not on file     Food insecurity:     Worry: Not on file     Inability: Not on file     Transportation needs:     Medical: Not  on file     Non-medical: Not on file   Tobacco Use     Smoking status: Never Smoker     Smokeless tobacco: Never Used   Substance and Sexual Activity     Alcohol use: No     Comment: maybe 3 glasses a year      Drug use: No     Sexual activity: Not Currently   Lifestyle     Physical activity:     Days per week: Not on file     Minutes per session: Not on file     Stress: Not on file   Relationships     Social connections:     Talks on phone: Not on file     Gets together: Not on file     Attends Christian service: Not on file     Active member of club or organization: Not on file     Attends meetings of clubs or organizations: Not on file     Relationship status: Not on file     Intimate partner violence:     Fear of current or ex partner: Not on file     Emotionally abused: Not on file     Physically abused: Not on file     Forced sexual activity: Not on file   Other Topics Concern     Parent/sibling w/ CABG, MI or angioplasty before 65F 55M? Not Asked      Service No     Blood Transfusions No     Caffeine Concern Not Asked     Occupational Exposure Not Asked     Hobby Hazards Not Asked     Sleep Concern Not Asked     Stress Concern Not Asked     Weight Concern Not Asked     Special Diet Not Asked     Back Care Not Asked     Exercise No     Bike Helmet Not Asked     Seat Belt Yes     Self-Exams Not Asked   Social History Narrative    Eats fruits and vegetables every day. He has at least 3 servings of dairy per day. Vitamin D supplement recommended.       Outpatient Encounter Medications as of 8/8/2019   Medication Sig Dispense Refill     albuterol (PROAIR HFA, PROVENTIL HFA, VENTOLIN HFA) 108 (90 BASE) MCG/ACT inhaler Inhale 2 puffs into the lungs every 6 hours as needed for shortness of breath / dyspnea 1 Inhaler 1     aspirin 81 MG tablet Take 1 tablet by mouth daily.       atorvastatin (LIPITOR) 40 MG tablet Take 1 tablet (40 mg) by mouth daily 90 tablet 3     blood glucose (NO BRAND SPECIFIED) test  strip Use to test blood sugars bid times daily or as directed 200 strip 3     blood glucose monitoring (NO BRAND SPECIFIED) meter device kit Use to test blood sugar bidtimes daily or as directed. 1 kit 3     fluticasone (FLOVENT DISKUS) 100 MCG/BLIST inhaler Inhale 1 puff into the lungs 2 times daily 3 Inhaler 1     glipiZIDE (GLUCOTROL) 5 MG tablet TAKE ONE TABLET BY MOUTH EVERY MORNING BEFORE BREAKFAST 90 tablet 3     lisinopril (PRINIVIL/ZESTRIL) 5 MG tablet Take 1 tablet (5 mg) by mouth daily 30 tablet 0     lisinopril (PRINIVIL/ZESTRIL) 5 MG tablet TAKE ONE TABLET BY MOUTH ONCE DAILY 90 tablet 0     metFORMIN (GLUCOPHAGE) 500 MG tablet Take 1 tablet (500 mg) by mouth daily (with dinner) 60 tablet 3     order for DME Equipment being ordered: CPAP mask only 1 each 0     order for DME Equipment being ordered: CPAP mask   full face mask (CPT code: a7030) and head gear (CPT code: ). 1 each 0     No facility-administered encounter medications on file as of 8/8/2019.              Review Of Systems  Skin: As above  Eyes: negative  Ears/Nose/Throat: negative  Respiratory: No shortness of breath, dyspnea on exertion, cough, or hemoptysis  Cardiovascular: negative  Gastrointestinal: negative  Genitourinary: negative  Musculoskeletal: negative  Neurologic: negative  Psychiatric: negative  Hematologic/Lymphatic/Immunologic: negative  Endocrine: negative      O:   NAD, WDWN, Alert & Oriented, Mood & Affect wnl, Vitals stable   Here today alone   There were no vitals taken for this visit.   General appearance normal   Vitals stable   Alert, oriented and in no acute distress     R helix well helaed slight pull on sup helix      Eyes: Conjunctivae/lids:Normal     ENT: Lips, buccal mucosa, tongue: normal    MSK:Normal    Cardiovascular: peripheral edema none    Pulm: Breathing Normal    Neuro/Psych: Orientation:Normal; Mood/Affect:Normal      A/P:  1. Hx of squamous cell carcinoma  Ear healed well  z plasty for pull on ear  discussed with patient   He would like to wait  Cont massge of scar  Return to clinic 6 months  F/u Dr. Anna for skin checks

## 2019-09-14 DIAGNOSIS — E11.9 TYPE 2 DIABETES MELLITUS WITHOUT COMPLICATION, WITHOUT LONG-TERM CURRENT USE OF INSULIN (H): ICD-10-CM

## 2019-09-14 NOTE — TELEPHONE ENCOUNTER
"Last Written Prescription Date:  5/24/19  Last Fill Quantity: 30 tablet,  # refills: 0   Last office visit: 4/16/2019 with prescribing provider:  Davi   Future Office Visit:      Requested Prescriptions   Pending Prescriptions Disp Refills     lisinopril (PRINIVIL/ZESTRIL) 5 MG tablet [Pharmacy Med Name: LISINOPRIL 5MG TABS] 90 tablet 0     Sig: TAKE ONE TABLET BY MOUTH ONCE DAILY       ACE Inhibitors (Including Combos) Protocol Failed - 9/14/2019  9:08 AM        Failed - Normal serum creatinine on file in past 12 months     Recent Labs   Lab Test 04/16/19  0909   CR 0.62*             Passed - Blood pressure under 140/90 in past 12 months     BP Readings from Last 3 Encounters:   08/08/19 118/68   07/11/19 138/60   05/15/19 122/68                 Passed - Recent (12 mo) or future (30 days) visit within the authorizing provider's specialty     Patient had office visit in the last 12 months or has a visit in the next 30 days with authorizing provider or within the authorizing provider's specialty.  See \"Patient Info\" tab in inbasket, or \"Choose Columns\" in Meds & Orders section of the refill encounter.              Passed - Medication is active on med list        Passed - Patient is age 18 or older        Passed - Normal serum potassium on file in past 12 months     Recent Labs   Lab Test 04/16/19  0909   POTASSIUM 4.1               "

## 2019-09-16 RX ORDER — LISINOPRIL 5 MG/1
TABLET ORAL
Qty: 90 TABLET | Refills: 0 | Status: SHIPPED | OUTPATIENT
Start: 2019-09-16 | End: 2020-01-14

## 2019-09-16 NOTE — TELEPHONE ENCOUNTER
Routing refill request to provider for review/approval because:  Labs out of range:  Cr  Lab Test 04/16/19  0909   CR 0.62*        Please review and authorize if appropriate,     Thank you,   Misa ESPANA RN

## 2019-11-02 ENCOUNTER — HEALTH MAINTENANCE LETTER (OUTPATIENT)
Age: 64
End: 2019-11-02

## 2020-01-13 DIAGNOSIS — E78.5 HYPERLIPIDEMIA LDL GOAL <130: ICD-10-CM

## 2020-01-13 DIAGNOSIS — E11.9 TYPE 2 DIABETES MELLITUS WITHOUT COMPLICATION, WITHOUT LONG-TERM CURRENT USE OF INSULIN (H): ICD-10-CM

## 2020-01-14 RX ORDER — ATORVASTATIN CALCIUM 40 MG/1
40 TABLET, FILM COATED ORAL DAILY
Qty: 90 TABLET | Refills: 0 | Status: SHIPPED | OUTPATIENT
Start: 2020-01-14 | End: 2020-02-28

## 2020-01-14 RX ORDER — GLIPIZIDE 5 MG/1
TABLET ORAL
Qty: 90 TABLET | Refills: 0 | Status: SHIPPED | OUTPATIENT
Start: 2020-01-14 | End: 2020-02-28

## 2020-01-14 RX ORDER — LISINOPRIL 5 MG/1
5 TABLET ORAL DAILY
Qty: 90 TABLET | Refills: 0 | Status: SHIPPED | OUTPATIENT
Start: 2020-01-14 | End: 2020-02-28

## 2020-01-14 NOTE — TELEPHONE ENCOUNTER
"atorvastatin (LIPITOR) 40 MG tablet 90 tablet 3 4/16/2019     glipiZIDE (GLUCOTROL) 5 MG tablet 90 tablet 3 4/16/2019     Last Written Prescription Date:  4/16/2019  Last Fill Quantity: 90,  # refills: 3     lisinopril (PRINIVIL/ZESTRIL) 5 MG tablet 90 tablet 0 9/16/2019     Last Written Prescription Date:  9/16/2019  Last Fill Quantity: 90,  # refills: 0     metFORMIN (GLUCOPHAGE) 500 MG tablet 60 tablet 3 4/16/2019     Last Written Prescription Date:  4/16/2019  Last Fill Quantity: 60,  # refills: 3   Last office visit: 7/11/2019 with prescribing provider: ABDIEL Anna    Future Office Visit:   Next 5 appointments (look out 90 days)    Jan 16, 2020  3:40 PM CST  Return Visit with Margie Anna MD  [facility] ([facility]) [address] [place] [id number] [phone]         Requested Prescriptions   Pending Prescriptions Disp Refills     atorvastatin (LIPITOR) 40 MG tablet 90 tablet 3     Sig: Take 1 tablet (40 mg) by mouth daily       Statins Protocol Passed - 1/13/2020  6:50 PM        Passed - LDL on file in past 12 months     Recent Labs   Lab Test 04/16/19  0909   LDL 91             Passed - No abnormal creatine kinase in past 12 months     No lab results found.             Passed - Recent (12 mo) or future (30 days) visit within the authorizing provider's specialty     Patient has had an office visit with the authorizing provider or a provider within the authorizing providers department within the previous 12 mos or has a future within next 30 days. See \"Patient Info\" tab in inbasket, or \"Choose Columns\" in Meds & Orders section of the refill encounter.              Passed - Medication is active on med list        Passed - Patient is age 18 or older        glipiZIDE (GLUCOTROL) 5 MG tablet 90 tablet 3     Sig: TAKE ONE TABLET BY MOUTH EVERY MORNING BEFORE BREAKFAST       Sulfonylurea Agents Failed - 1/13/2020  6:50 PM        Failed - " "Patient has documented A1c within the specified period of time.     If HgbA1C is 8 or greater, it needs to be on file within the past 3 months.  If less than 8, must be on file within the past 6 months.     Recent Labs   Lab Test 04/16/19  0909   A1C 6.1*             Failed - Patient has a recent creatinine (normal) within the past 12 mos.     Recent Labs   Lab Test 04/16/19  0909   CR 0.62*             Failed - Recent (6 mo) or future (30 days) visit within the authorizing provider's specialty     Patient had office visit in the last 6 months or has a visit in the next 30 days with authorizing provider or within the authorizing provider's specialty.  See \"Patient Info\" tab in inbasket, or \"Choose Columns\" in Meds & Orders section of the refill encounter.            Passed - Blood pressure less than 140/90 in past 6 months     BP Readings from Last 3 Encounters:   08/08/19 118/68   07/11/19 138/60   05/15/19 122/68                 Passed - Patient has documented LDL within the past 12 mos.     Recent Labs   Lab Test 04/16/19  0909   LDL 91             Passed - Patient has had a Microalbumin in the past 15 mos.     Recent Labs   Lab Test 04/16/19  0909   MICROL 7   UMALCR 12.70             Passed - Medication is active on med list        Passed - Patient is age 18 or older        metFORMIN (GLUCOPHAGE) 500 MG tablet 60 tablet 3     Sig: Take 1 tablet (500 mg) by mouth daily (with dinner)       Biguanide Agents Failed - 1/13/2020  6:50 PM        Failed - Patient has documented A1c within the specified period of time.     If HgbA1C is 8 or greater, it needs to be on file within the past 3 months.  If less than 8, must be on file within the past 6 months.     Recent Labs   Lab Test 04/16/19  0909   A1C 6.1*             Failed - Recent (6 mo) or future (30 days) visit within the authorizing provider's specialty     Patient had office visit in the last 6 months or has a visit in the next 30 days with authorizing provider " "or within the authorizing provider's specialty.  See \"Patient Info\" tab in inbasket, or \"Choose Columns\" in Meds & Orders section of the refill encounter.            Passed - Blood pressure less than 140/90 in past 6 months     BP Readings from Last 3 Encounters:   08/08/19 118/68   07/11/19 138/60   05/15/19 122/68                 Passed - Patient has documented LDL within the past 12 mos.     Recent Labs   Lab Test 04/16/19  0909   LDL 91             Passed - Patient has had a Microalbumin in the past 15 mos.     Recent Labs   Lab Test 04/16/19  0909   MICROL 7   UMALCR 12.70             Passed - Patient is age 10 or older        Passed - Patient's CR is NOT>1.4 OR Patient's EGFR is NOT<45 within past 12 mos.     Recent Labs   Lab Test 04/16/19  0909   GFRESTIMATED >90   GFRESTBLACK >90       Recent Labs   Lab Test 04/16/19  0909   CR 0.62*             Passed - Patient does NOT have a diagnosis of CHF.        Passed - Medication is active on med list        lisinopril (PRINIVIL/ZESTRIL) 5 MG tablet 90 tablet 0     Sig: Take 1 tablet (5 mg) by mouth daily       ACE Inhibitors (Including Combos) Protocol Failed - 1/13/2020  6:50 PM        Failed - Normal serum creatinine on file in past 12 months     Recent Labs   Lab Test 04/16/19  0909   CR 0.62*             Passed - Blood pressure under 140/90 in past 12 months     BP Readings from Last 3 Encounters:   08/08/19 118/68   07/11/19 138/60   05/15/19 122/68                 Passed - Recent (12 mo) or future (30 days) visit within the authorizing provider's specialty     Patient has had an office visit with the authorizing provider or a provider within the authorizing providers department within the previous 12 mos or has a future within next 30 days. See \"Patient Info\" tab in inbasket, or \"Choose Columns\" in Meds & Orders section of the refill encounter.              Passed - Medication is active on med list        Passed - Patient is age 18 or older        Passed - " Normal serum potassium on file in past 12 months     Recent Labs   Lab Test 04/16/19  0909   POTASSIUM 4.1

## 2020-01-16 ENCOUNTER — OFFICE VISIT (OUTPATIENT)
Dept: FAMILY MEDICINE | Facility: CLINIC | Age: 65
End: 2020-01-16
Payer: COMMERCIAL

## 2020-01-16 VITALS — SYSTOLIC BLOOD PRESSURE: 122 MMHG | DIASTOLIC BLOOD PRESSURE: 76 MMHG

## 2020-01-16 DIAGNOSIS — L81.4 SOLAR LENTIGINOSIS: ICD-10-CM

## 2020-01-16 DIAGNOSIS — L57.0 ACTINIC KERATOSIS: Primary | ICD-10-CM

## 2020-01-16 PROCEDURE — 17000 DESTRUCT PREMALG LESION: CPT | Performed by: FAMILY MEDICINE

## 2020-01-16 PROCEDURE — 17003 DESTRUCT PREMALG LES 2-14: CPT | Performed by: FAMILY MEDICINE

## 2020-01-16 NOTE — LETTER
"    1/16/2020         RE: Rommel Bryan  [facility] [place] [id number]        Dear Colleague,    Thank you for referring your patient, Rommel Bryan, to the [facility]. Please see a copy of my visit note below.    [facility] - PRIMARY CARE SKIN    CC: skin cancer screening (head & neck)  SUBJECTIVE:   Rommel Bryan is a(n) 64 year old male who presents to clinic today for a skin cancer screening of the head and neck.     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    INTEGUMENTARY/SKIN: POSITIVE for changing lesions  ROS: 14 point review of systems was negative except the symptoms listed above in the HPI.        OBJECTIVE:   GENERAL: healthy, alert and no distress.  SKIN: Guallpa Skin Type - III.  Scalp, Face, Neck examined. The dermatoscope was used to help evaluate pigmented lesions.  Skin Pertinent Findings:  Scalp, face: Multiple brown, macule(s) most consistent with benign solar lentigo.diffuse actinic damage    Face: Some stuck-on appearing papules, raised, brown, coarse-textured, round lesion(s) most consistent with seborrheic keratoses  Erythematous, scaly, non-indurated lesion(s) most consistent with actinic keratosis involving the left eyebrow, right temple, right lateral forehead, left frontal scalp  Name: Liquid nitrogen Cry-Ac cryotherapy  Indication: Pre-malignant actinic keratosis  Completed by: Beverly Anna MD.  Note : Discussed natural history of lesion and treatment options. Prior to treatment, we discussed inflammation, " tenderness post-procedure, the healing process, and the risks of pain, infection, scarring, blistering, and hypo-/hyperpigmentation after healing. Explained that these lesions may grow back and may need additional treatment or re-evaluation. The patient understood and verbally agreed to proceed with cryotherapy.    Each actinic keratosis was treated using liquid nitrogen Cry-Ac with a two five second bursts with a pause to allow for the area to thaw.    The patient tolerated the procedure well and left in good condition. If this lesion should persist or recur, then it needs to be re-evaluated.  Total number of lesions treated: 5      Significant Findings:  Left ear: well-healed scar           ASSESSMENT:     Encounter Diagnosis   Name Primary?     Actinic keratosis Yes       PLAN:   Patient Instructions   Recheck 6 months  ACTINIC KERATOSES POST-TREATMENT CARE INSTRUCTIONS  Actinic keratoses are benign, scaly or gritty lesions that appear in sun-exposed areas and may progress to skin cancers. For this reason, it is important to treat them before they become cancerous. Liquid nitrogen is the most commonly used and most effective treatment for actinic keratoses; it is mildly uncomfortable when applied to the skin, but the discomfort rapidly subsides.    Post-Treatment:  You may experience burning and/or stinging immediately following the procedure. The discomfort from the procedure may persist over the next 12-24 hours. The area treated will look pinker and slightly swollen before the healing process begins. You may also notice redness, swelling, tenderness, weeping and crusts or scabs. Healing time is approximately 10-14 days.    Blister - You may or may notice blistering from the freezing. If you develop an uncomfortable blister from today's treatment, you may gently puncture this with a needle that has been cleaned with alcohol. However, do not remove the protective skin layer of the blister.    Scab - After a few  days, you may notice scaliness or scab formation. Do not pick at the scabs because this may cause slower healing and a permanent scar.    The skin may appear temporarily darker at the treatment site, but this usually fades over a period of months, provided that the area is protected from the sun.    Care of the areas treated:    Wash the area with a mild cleanser.    Gently pat dry.    Do not rub.     Keep protected from the sun during the healing process and for a full year following treatment as the skin continues to remodel during this time.    Apply Vaseline or Aquaphor ointment sparingly to the site for the first 7 days after treatment.    Do not use Neosporin, as many people eventually develop a medication allergy, that can easily be confused with an infection, to Neomycin.    Return if:  There should not be any residual scaling. If there is any concern that the lesion has persisted after 4-6 weeks, make an appointment for a re-check. Healing time does vary depending on your individual healing process and the area of the body treated. Most patients will be healed in one month.    Signs of Infection:  Thankfully this is rare. However if you notice persistent colored drainage, increasing pain, fever or other signs of infection, please call us at: [phone]      The patient was counseled about sunscreens and sun avoidance. The patient was counseled to check the skin regularly and report any lesion that is new, changing, itching, scabbing, bleeding or otherwise bothersome. The patient was discharged ambulatory and in stable condition.    TT: 20 minutes.  CT: 15 minutes.        Again, thank you for allowing me to participate in the care of your patient.        Sincerely,        Margie Anna MD

## 2020-01-16 NOTE — PATIENT INSTRUCTIONS
Recheck 6 months  ACTINIC KERATOSES POST-TREATMENT CARE INSTRUCTIONS  Actinic keratoses are benign, scaly or gritty lesions that appear in sun-exposed areas and may progress to skin cancers. For this reason, it is important to treat them before they become cancerous. Liquid nitrogen is the most commonly used and most effective treatment for actinic keratoses; it is mildly uncomfortable when applied to the skin, but the discomfort rapidly subsides.    Post-Treatment:  You may experience burning and/or stinging immediately following the procedure. The discomfort from the procedure may persist over the next 12-24 hours. The area treated will look pinker and slightly swollen before the healing process begins. You may also notice redness, swelling, tenderness, weeping and crusts or scabs. Healing time is approximately 10-14 days.    Blister - You may or may notice blistering from the freezing. If you develop an uncomfortable blister from today's treatment, you may gently puncture this with a needle that has been cleaned with alcohol. However, do not remove the protective skin layer of the blister.    Scab - After a few days, you may notice scaliness or scab formation. Do not pick at the scabs because this may cause slower healing and a permanent scar.    The skin may appear temporarily darker at the treatment site, but this usually fades over a period of months, provided that the area is protected from the sun.    Care of the areas treated:    Wash the area with a mild cleanser.    Gently pat dry.    Do not rub.     Keep protected from the sun during the healing process and for a full year following treatment as the skin continues to remodel during this time.    Apply Vaseline or Aquaphor ointment sparingly to the site for the first 7 days after treatment.    Do not use Neosporin, as many people eventually develop a medication allergy, that can easily be confused with an infection, to Neomycin.    Return if:  There  should not be any residual scaling. If there is any concern that the lesion has persisted after 4-6 weeks, make an appointment for a re-check. Healing time does vary depending on your individual healing process and the area of the body treated. Most patients will be healed in one month.    Signs of Infection:  Thankfully this is rare. However if you notice persistent colored drainage, increasing pain, fever or other signs of infection, please call us at: [phone]

## 2020-01-16 NOTE — PROGRESS NOTES
"[facility] - PRIMARY CARE SKIN    CC: skin cancer screening (head & neck)  SUBJECTIVE:   Rommel Bryan is a(n) 64 year old male who presents to clinic today for a skin cancer screening of the head and neck.     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    INTEGUMENTARY/SKIN: POSITIVE for changing lesions  ROS: 14 point review of systems was negative except the symptoms listed above in the HPI.        OBJECTIVE:   GENERAL: healthy, alert and no distress.  SKIN: Guallpa Skin Type - III.  Scalp, Face, Neck examined. The dermatoscope was used to help evaluate pigmented lesions.  Skin Pertinent Findings:  Scalp, face: Multiple brown, macule(s) most consistent with benign solar lentigo.diffuse actinic damage    Face: Some stuck-on appearing papules, raised, brown, coarse-textured, round lesion(s) most consistent with seborrheic keratoses  Erythematous, scaly, non-indurated lesion(s) most consistent with actinic keratosis involving the left eyebrow, right temple, right lateral forehead, left frontal scalp  Name: Liquid nitrogen Cry-Ac cryotherapy  Indication: Pre-malignant actinic keratosis  Completed by: Beverly Anna MD.  Note : Discussed natural history of lesion and treatment options. Prior to treatment, we discussed inflammation, tenderness post-procedure, the healing process, and the risks of pain, infection, scarring, blistering, and hypo-/hyperpigmentation after healing. Explained that these lesions may grow back and may need additional treatment or re-evaluation. The patient understood " and verbally agreed to proceed with cryotherapy.    Each actinic keratosis was treated using liquid nitrogen Cry-Ac with a two five second bursts with a pause to allow for the area to thaw.    The patient tolerated the procedure well and left in good condition. If this lesion should persist or recur, then it needs to be re-evaluated.  Total number of lesions treated: 5      Significant Findings:  Left ear: well-healed scar           ASSESSMENT:     Encounter Diagnosis   Name Primary?     Actinic keratosis Yes       PLAN:   Patient Instructions   Recheck 6 months  ACTINIC KERATOSES POST-TREATMENT CARE INSTRUCTIONS  Actinic keratoses are benign, scaly or gritty lesions that appear in sun-exposed areas and may progress to skin cancers. For this reason, it is important to treat them before they become cancerous. Liquid nitrogen is the most commonly used and most effective treatment for actinic keratoses; it is mildly uncomfortable when applied to the skin, but the discomfort rapidly subsides.    Post-Treatment:  You may experience burning and/or stinging immediately following the procedure. The discomfort from the procedure may persist over the next 12-24 hours. The area treated will look pinker and slightly swollen before the healing process begins. You may also notice redness, swelling, tenderness, weeping and crusts or scabs. Healing time is approximately 10-14 days.    Blister - You may or may notice blistering from the freezing. If you develop an uncomfortable blister from today's treatment, you may gently puncture this with a needle that has been cleaned with alcohol. However, do not remove the protective skin layer of the blister.    Scab - After a few days, you may notice scaliness or scab formation. Do not pick at the scabs because this may cause slower healing and a permanent scar.    The skin may appear temporarily darker at the treatment site, but this usually fades over a period of months, provided that the  area is protected from the sun.    Care of the areas treated:    Wash the area with a mild cleanser.    Gently pat dry.    Do not rub.     Keep protected from the sun during the healing process and for a full year following treatment as the skin continues to remodel during this time.    Apply Vaseline or Aquaphor ointment sparingly to the site for the first 7 days after treatment.    Do not use Neosporin, as many people eventually develop a medication allergy, that can easily be confused with an infection, to Neomycin.    Return if:  There should not be any residual scaling. If there is any concern that the lesion has persisted after 4-6 weeks, make an appointment for a re-check. Healing time does vary depending on your individual healing process and the area of the body treated. Most patients will be healed in one month.    Signs of Infection:  Thankfully this is rare. However if you notice persistent colored drainage, increasing pain, fever or other signs of infection, please call us at: [phone]      The patient was counseled about sunscreens and sun avoidance. The patient was counseled to check the skin regularly and report any lesion that is new, changing, itching, scabbing, bleeding or otherwise bothersome. The patient was discharged ambulatory and in stable condition.    TT: 20 minutes.  CT: 15 minutes.

## 2020-02-21 DIAGNOSIS — E78.5 HYPERLIPIDEMIA LDL GOAL <130: ICD-10-CM

## 2020-02-21 DIAGNOSIS — E11.9 TYPE 2 DIABETES MELLITUS WITHOUT COMPLICATION, WITHOUT LONG-TERM CURRENT USE OF INSULIN (H): ICD-10-CM

## 2020-02-24 RX ORDER — GLIPIZIDE 5 MG/1
TABLET ORAL
Qty: 90 TABLET | Refills: 0 | OUTPATIENT
Start: 2020-02-24

## 2020-02-24 RX ORDER — LISINOPRIL 5 MG/1
TABLET ORAL
Qty: 90 TABLET | Refills: 0 | OUTPATIENT
Start: 2020-02-24

## 2020-02-24 RX ORDER — ATORVASTATIN CALCIUM 40 MG/1
TABLET, FILM COATED ORAL
Qty: 90 TABLET | Refills: 0 | OUTPATIENT
Start: 2020-02-24

## 2020-02-24 NOTE — TELEPHONE ENCOUNTER
"lisinopril (ZESTRIL) 5 MG tablet     Last Written Prescription Date:  1/14/2020  Last Fill Quantity: 90,  # refills: 0   Last office visit: 4/16/2019 with prescribing provider:  Dr. Tomlinson   Future Office Visit:  Unknown      atorvastatin (LIPITOR) 40 MG tablet    Last Written Prescription Date:  1/14/2020  Last Fill Quantity: 90,  # refills: 0   Last office visit: 4/16/2019 with prescribing provider:  Dr. Davi Saravia Office Visit:  Unknown     glipiZIDE (GLUCOTROL) 5 MG tablet     Last Written Prescription Date:  1/14/2020  Last Fill Quantity: 90,  # refills: 0   Last office visit: 4/16/2019 with prescribing provider:  Dr. Davi Saravia Office Visit:  Unknown     Requested Prescriptions   Pending Prescriptions Disp Refills     lisinopril (ZESTRIL) 5 MG tablet [Pharmacy Med Name: LISINOPRIL  5MG  TAB] 90 tablet 0     Sig: TAKE 1 TABLET BY MOUTH  DAILY (DUE FOR A FOLLOW-UP  IN CLINIC IN [place], CALL  [phone] TO SCHEDULE)       ACE Inhibitors (Including Combos) Protocol Failed - 2/21/2020  9:08 PM        Failed - Normal serum creatinine on file in past 12 months     Recent Labs   Lab Test 04/16/19  0909   CR 0.62*             Passed - Blood pressure under 140/90 in past 12 months     BP Readings from Last 3 Encounters:   01/16/20 122/76   08/08/19 118/68   07/11/19 138/60                 Passed - Recent (12 mo) or future (30 days) visit within the authorizing provider's specialty     Patient has had an office visit with the authorizing provider or a provider within the authorizing providers department within the previous 12 mos or has a future within next 30 days. See \"Patient Info\" tab in inbasket, or \"Choose Columns\" in Meds & Orders section of the refill encounter.              Passed - Medication is active on med list        Passed - Patient is age 18 or older        Passed - Normal serum potassium on file in past 12 months     Recent Labs   Lab Test 04/16/19  0909   POTASSIUM 4.1             " "atorvastatin (LIPITOR) 40 MG tablet [Pharmacy Med Name: ATORVASTATIN  40MG  TAB] 90 tablet 0     Sig: TAKE 1 TABLET BY MOUTH  DAILY       Statins Protocol Passed - 2/21/2020  9:08 PM        Passed - LDL on file in past 12 months     Recent Labs   Lab Test 04/16/19  0909   LDL 91             Passed - No abnormal creatine kinase in past 12 months     No lab results found.             Passed - Recent (12 mo) or future (30 days) visit within the authorizing provider's specialty     Patient has had an office visit with the authorizing provider or a provider within the authorizing providers department within the previous 12 mos or has a future within next 30 days. See \"Patient Info\" tab in inbasket, or \"Choose Columns\" in Meds & Orders section of the refill encounter.              Passed - Medication is active on med list        Passed - Patient is age 18 or older        glipiZIDE (GLUCOTROL) 5 MG tablet [Pharmacy Med Name: GLIPIZIDE  5MG  TAB] 90 tablet 0     Sig: TAKE 1 TABLET BY MOUTH  EVERY MORNING BEFORE  BREAKFAST       Sulfonylurea Agents Failed - 2/21/2020  9:08 PM        Failed - Patient has documented A1c within the specified period of time.     If HgbA1C is 8 or greater, it needs to be on file within the past 3 months.  If less than 8, must be on file within the past 6 months.     Recent Labs   Lab Test 04/16/19 0909   A1C 6.1*             Failed - Patient has a recent creatinine (normal) within the past 12 mos.     Recent Labs   Lab Test 04/16/19  0909   CR 0.62*             Passed - Blood pressure less than 140/90 in past 6 months     BP Readings from Last 3 Encounters:   01/16/20 122/76   08/08/19 118/68   07/11/19 138/60                 Passed - Patient has documented LDL within the past 12 mos.     Recent Labs   Lab Test 04/16/19  0909   LDL 91             Passed - Patient has had a Microalbumin in the past 15 mos.     Recent Labs   Lab Test 04/16/19  0909   MICROL 7   UMALCR 12.70             Passed - " "Medication is active on med list        Passed - Patient is age 18 or older        Passed - Recent (6 mo) or future (30 days) visit within the authorizing provider's specialty     Patient had office visit in the last 6 months or has a visit in the next 30 days with authorizing provider or within the authorizing provider's specialty.  See \"Patient Info\" tab in inbasket, or \"Choose Columns\" in Meds & Orders section of the refill encounter.              "

## 2020-02-28 RX ORDER — ATORVASTATIN CALCIUM 40 MG/1
40 TABLET, FILM COATED ORAL DAILY
Qty: 90 TABLET | Refills: 0 | Status: SHIPPED | OUTPATIENT
Start: 2020-02-28 | End: 2020-03-18

## 2020-02-28 RX ORDER — LISINOPRIL 5 MG/1
5 TABLET ORAL DAILY
Qty: 90 TABLET | Refills: 0 | Status: SHIPPED | OUTPATIENT
Start: 2020-02-28 | End: 2020-03-18

## 2020-02-28 RX ORDER — GLIPIZIDE 5 MG/1
TABLET ORAL
Qty: 90 TABLET | Refills: 0 | Status: SHIPPED | OUTPATIENT
Start: 2020-02-28 | End: 2020-03-18

## 2020-02-28 NOTE — TELEPHONE ENCOUNTER
Patient is due for OV with PCP (follow up on diabetes)     [employer] message sent to patient     Routing refill request to provider for review/approval because:  Pharmacy asking for additional 90 day supply now though pt should not need until 4/2020     Iraida ROBERTS RN

## 2020-02-28 NOTE — TELEPHONE ENCOUNTER
[facility] the Pt's pharmacy called and asked us to reconsider this request as they report that they will not refill it to the PT until it is time to do so. They can be reached at  [phone] with reference #  [id number].

## 2020-03-05 ENCOUNTER — TELEPHONE (OUTPATIENT)
Dept: LAB | Facility: CLINIC | Age: 65
End: 2020-03-05

## 2020-03-05 NOTE — TELEPHONE ENCOUNTER
"Patient is scheduled to be seen in our lab March 11.  Appointment notes indicate \"Fasting Lab\".      NO current FUTURE or STANDING ORDERS have been placed.  Please place current FUTURE or STANDING orders as needed.      Note that orders have a maximum duration of one (1) year.    Thank you.  Please call if you have any questions.     "

## 2020-03-11 DIAGNOSIS — E11.9 TYPE 2 DIABETES MELLITUS WITHOUT COMPLICATION, WITHOUT LONG-TERM CURRENT USE OF INSULIN (H): ICD-10-CM

## 2020-03-11 DIAGNOSIS — Z00.00 ROUTINE GENERAL MEDICAL EXAMINATION AT A HEALTH CARE FACILITY: ICD-10-CM

## 2020-03-11 DIAGNOSIS — E78.5 HYPERLIPIDEMIA LDL GOAL <130: ICD-10-CM

## 2020-03-11 DIAGNOSIS — Z12.5 SCREENING FOR PROSTATE CANCER: ICD-10-CM

## 2020-03-11 DIAGNOSIS — Z13.29 SCREENING FOR HYPOTHYROIDISM: ICD-10-CM

## 2020-03-11 LAB
ALBUMIN SERPL-MCNC: 3.7 G/DL (ref 3.4–5)
ALP SERPL-CCNC: 84 U/L (ref 40–150)
ALT SERPL W P-5'-P-CCNC: 36 U/L (ref 0–70)
ANION GAP SERPL CALCULATED.3IONS-SCNC: 7 MMOL/L (ref 3–14)
AST SERPL W P-5'-P-CCNC: 15 U/L (ref 0–45)
BILIRUB SERPL-MCNC: 0.6 MG/DL (ref 0.2–1.3)
BUN SERPL-MCNC: 23 MG/DL (ref 7–30)
CALCIUM SERPL-MCNC: 9.1 MG/DL (ref 8.5–10.1)
CHLORIDE SERPL-SCNC: 103 MMOL/L (ref 94–109)
CHOLEST SERPL-MCNC: 172 MG/DL
CO2 SERPL-SCNC: 26 MMOL/L (ref 20–32)
CREAT SERPL-MCNC: 0.77 MG/DL (ref 0.66–1.25)
CREAT UR-MCNC: 42 MG/DL
ERYTHROCYTE [DISTWIDTH] IN BLOOD BY AUTOMATED COUNT: 13.3 % (ref 10–15)
GFR SERPL CREATININE-BSD FRML MDRD: >90 ML/MIN/{1.73_M2}
GLUCOSE SERPL-MCNC: 109 MG/DL (ref 70–99)
HBA1C MFR BLD: 7 % (ref 0–5.6)
HCT VFR BLD AUTO: 43.9 % (ref 40–53)
HDLC SERPL-MCNC: 38 MG/DL
HGB BLD-MCNC: 14.6 G/DL (ref 13.3–17.7)
LDLC SERPL CALC-MCNC: 107 MG/DL
MCH RBC QN AUTO: 27.9 PG (ref 26.5–33)
MCHC RBC AUTO-ENTMCNC: 33.3 G/DL (ref 31.5–36.5)
MCV RBC AUTO: 84 FL (ref 78–100)
MICROALBUMIN UR-MCNC: <5 MG/L
MICROALBUMIN/CREAT UR: NORMAL MG/G CR (ref 0–17)
NONHDLC SERPL-MCNC: 134 MG/DL
PLATELET # BLD AUTO: 194 10E9/L (ref 150–450)
POTASSIUM SERPL-SCNC: 4.1 MMOL/L (ref 3.4–5.3)
PROT SERPL-MCNC: 8 G/DL (ref 6.8–8.8)
PSA SERPL-ACNC: 0.51 UG/L (ref 0–4)
RBC # BLD AUTO: 5.23 10E12/L (ref 4.4–5.9)
SODIUM SERPL-SCNC: 136 MMOL/L (ref 133–144)
TRIGL SERPL-MCNC: 133 MG/DL
TSH SERPL DL<=0.005 MIU/L-ACNC: 2.07 MU/L (ref 0.4–4)
WBC # BLD AUTO: 8.3 10E9/L (ref 4–11)

## 2020-03-11 PROCEDURE — 85027 COMPLETE CBC AUTOMATED: CPT | Performed by: INTERNAL MEDICINE

## 2020-03-11 PROCEDURE — 82043 UR ALBUMIN QUANTITATIVE: CPT | Performed by: INTERNAL MEDICINE

## 2020-03-11 PROCEDURE — 84443 ASSAY THYROID STIM HORMONE: CPT | Performed by: INTERNAL MEDICINE

## 2020-03-11 PROCEDURE — 80053 COMPREHEN METABOLIC PANEL: CPT | Performed by: INTERNAL MEDICINE

## 2020-03-11 PROCEDURE — G0103 PSA SCREENING: HCPCS | Performed by: INTERNAL MEDICINE

## 2020-03-11 PROCEDURE — 36415 COLL VENOUS BLD VENIPUNCTURE: CPT | Performed by: INTERNAL MEDICINE

## 2020-03-11 PROCEDURE — 80061 LIPID PANEL: CPT | Performed by: INTERNAL MEDICINE

## 2020-03-11 PROCEDURE — 83036 HEMOGLOBIN GLYCOSYLATED A1C: CPT | Performed by: INTERNAL MEDICINE

## 2020-03-16 DIAGNOSIS — E78.5 HYPERLIPIDEMIA LDL GOAL <130: ICD-10-CM

## 2020-03-16 DIAGNOSIS — E11.9 TYPE 2 DIABETES MELLITUS WITHOUT COMPLICATION, WITHOUT LONG-TERM CURRENT USE OF INSULIN (H): ICD-10-CM

## 2020-03-16 NOTE — TELEPHONE ENCOUNTER
Spoke to patient to cancel physical, he needs the pending meds refilled please.   Sherley Barakat MA

## 2020-03-16 NOTE — TELEPHONE ENCOUNTER
"metFORMIN (GLUCOPHAGE) 500 MG tablet  180 tablet  0  1/14/2020   No    Sig - Route: Take 1 tablet (500 mg) by mouth daily (with dinner)      Last Written Prescription Date:  01/14/2020  Last Fill Quantity: 180,  # refills: 0   Last office visit: 1/16/2020 with prescribing provider:     Future Office Visit:        lisinopril (ZESTRIL) 5 MG tablet  90 tablet  0  2/28/2020   No    Sig - Route: Take 1 tablet (5 mg) by mouth daily        glipiZIDE (GLUCOTROL) 5 MG tablet  90 tablet  0  2/28/2020   No    Sig: TAKE ONE TABLET BY MOUTH EVERY MORNING BEFORE BREAKFAST        atorvastatin (LIPITOR) 40 MG tablet  90 tablet  0  2/28/2020   No    Sig - Route: Take 1 tablet (40 mg) by mouth daily      Above 3 Last Written Prescription Date:  02/28/2020  Last Fill Quantity: 90,  # refills: 0   Last office visit: 1/16/2020 with prescribing provider:     Future Office Visit:      Requested Prescriptions   Pending Prescriptions Disp Refills     atorvastatin (LIPITOR) 40 MG tablet 90 tablet 0     Sig: Take 1 tablet (40 mg) by mouth daily       Statins Protocol Passed - 3/16/2020  2:40 PM        Passed - LDL on file in past 12 months     Recent Labs   Lab Test 03/11/20  0720   *             Passed - No abnormal creatine kinase in past 12 months     No lab results found.             Passed - Recent (12 mo) or future (30 days) visit within the authorizing provider's specialty     Patient has had an office visit with the authorizing provider or a provider within the authorizing providers department within the previous 12 mos or has a future within next 30 days. See \"Patient Info\" tab in inbasket, or \"Choose Columns\" in Meds & Orders section of the refill encounter.              Passed - Medication is active on med list        Passed - Patient is age 18 or older           glipiZIDE (GLUCOTROL) 5 MG tablet 90 tablet 0     Sig: TAKE ONE TABLET BY MOUTH EVERY MORNING BEFORE BREAKFAST       Sulfonylurea Agents Passed - 3/16/2020  2:40 PM " "       Passed - Blood pressure less than 140/90 in past 6 months     BP Readings from Last 3 Encounters:   01/16/20 122/76   08/08/19 118/68   07/11/19 138/60                 Passed - Patient has documented LDL within the past 12 mos.     Recent Labs   Lab Test 03/11/20  0720   *             Passed - Patient has had a Microalbumin in the past 15 mos.     Recent Labs   Lab Test 03/11/20 0720   MICROL <5   UMALCR Unable to calculate due to low value             Passed - Patient has documented A1c within the specified period of time.     If HgbA1C is 8 or greater, it needs to be on file within the past 3 months.  If less than 8, must be on file within the past 6 months.     Recent Labs   Lab Test 03/11/20 0720   A1C 7.0*             Passed - Medication is active on med list        Passed - Patient is age 18 or older        Passed - Patient has a recent creatinine (normal) within the past 12 mos.     Recent Labs   Lab Test 03/11/20 0720   CR 0.77       Ok to refill medication if creatinine is low          Passed - Recent (6 mo) or future (30 days) visit within the authorizing provider's specialty     Patient had office visit in the last 6 months or has a visit in the next 30 days with authorizing provider or within the authorizing provider's specialty.  See \"Patient Info\" tab in inbasket, or \"Choose Columns\" in Meds & Orders section of the refill encounter.               lisinopril (ZESTRIL) 5 MG tablet 90 tablet 0     Sig: Take 1 tablet (5 mg) by mouth daily       ACE Inhibitors (Including Combos) Protocol Passed - 3/16/2020  2:40 PM        Passed - Blood pressure under 140/90 in past 12 months     BP Readings from Last 3 Encounters:   01/16/20 122/76   08/08/19 118/68   07/11/19 138/60                 Passed - Recent (12 mo) or future (30 days) visit within the authorizing provider's specialty     Patient has had an office visit with the authorizing provider or a provider within the authorizing providers " "department within the previous 12 mos or has a future within next 30 days. See \"Patient Info\" tab in inbasket, or \"Choose Columns\" in Meds & Orders section of the refill encounter.              Passed - Medication is active on med list        Passed - Patient is age 18 or older        Passed - Normal serum creatinine on file in past 12 months     Recent Labs   Lab Test 03/11/20  0720   CR 0.77       Ok to refill medication if creatinine is low          Passed - Normal serum potassium on file in past 12 months     Recent Labs   Lab Test 03/11/20  0720   POTASSIUM 4.1                metFORMIN (GLUCOPHAGE) 500 MG tablet 180 tablet 0     Sig: Take 1 tablet (500 mg) by mouth daily (with dinner)       Biguanide Agents Passed - 3/16/2020  2:40 PM        Passed - Blood pressure less than 140/90 in past 6 months     BP Readings from Last 3 Encounters:   01/16/20 122/76   08/08/19 118/68   07/11/19 138/60                 Passed - Patient has documented LDL within the past 12 mos.     Recent Labs   Lab Test 03/11/20  0720   *             Passed - Patient has had a Microalbumin in the past 15 mos.     Recent Labs   Lab Test 03/11/20  0720   MICROL <5   UMALCR Unable to calculate due to low value             Passed - Patient is age 10 or older        Passed - Patient has documented A1c within the specified period of time.     If HgbA1C is 8 or greater, it needs to be on file within the past 3 months.  If less than 8, must be on file within the past 6 months.     Recent Labs   Lab Test 03/11/20  0720   A1C 7.0*             Passed - Patient's CR is NOT>1.4 OR Patient's EGFR is NOT<45 within past 12 mos.     Recent Labs   Lab Test 03/11/20  0720   GFRESTIMATED >90   GFRESTBLACK >90       Recent Labs   Lab Test 03/11/20  0720   CR 0.77             Passed - Patient does NOT have a diagnosis of CHF.        Passed - Medication is active on med list        Passed - Recent (6 mo) or future (30 days) visit within the authorizing " "provider's specialty     Patient had office visit in the last 6 months or has a visit in the next 30 days with authorizing provider or within the authorizing provider's specialty.  See \"Patient Info\" tab in inbasket, or \"Choose Columns\" in Meds & Orders section of the refill encounter.                 "

## 2020-03-18 RX ORDER — ATORVASTATIN CALCIUM 40 MG/1
40 TABLET, FILM COATED ORAL DAILY
Qty: 90 TABLET | Refills: 0 | Status: SHIPPED | OUTPATIENT
Start: 2020-03-18 | End: 2020-05-18

## 2020-03-18 RX ORDER — GLIPIZIDE 5 MG/1
TABLET ORAL
Qty: 90 TABLET | Refills: 0 | Status: SHIPPED | OUTPATIENT
Start: 2020-03-18 | End: 2020-05-18

## 2020-03-18 RX ORDER — LISINOPRIL 5 MG/1
5 TABLET ORAL DAILY
Qty: 90 TABLET | Refills: 0 | Status: SHIPPED | OUTPATIENT
Start: 2020-03-18 | End: 2020-05-18

## 2020-05-13 DIAGNOSIS — J45.40 MODERATE PERSISTENT ASTHMA WITHOUT COMPLICATION: ICD-10-CM

## 2020-05-14 NOTE — TELEPHONE ENCOUNTER
Prescription approved per [facility] Refill Protocol.  Has office visit scheduled in 2 months

## 2020-05-17 DIAGNOSIS — E11.9 TYPE 2 DIABETES MELLITUS WITHOUT COMPLICATION, WITHOUT LONG-TERM CURRENT USE OF INSULIN (H): ICD-10-CM

## 2020-05-17 DIAGNOSIS — E78.5 HYPERLIPIDEMIA LDL GOAL <130: ICD-10-CM

## 2020-05-18 RX ORDER — ATORVASTATIN CALCIUM 40 MG/1
TABLET, FILM COATED ORAL
Qty: 30 TABLET | Refills: 0 | Status: SHIPPED | OUTPATIENT
Start: 2020-05-18 | End: 2020-09-15

## 2020-05-18 RX ORDER — GLIPIZIDE 5 MG/1
TABLET ORAL
Qty: 30 TABLET | Refills: 0 | Status: SHIPPED | OUTPATIENT
Start: 2020-05-18 | End: 2020-09-16

## 2020-05-18 RX ORDER — LISINOPRIL 5 MG/1
TABLET ORAL
Qty: 30 TABLET | Refills: 0 | Status: SHIPPED | OUTPATIENT
Start: 2020-05-18 | End: 2020-09-15

## 2020-05-18 NOTE — TELEPHONE ENCOUNTER
Medication is being filled for 1 time refill only due to:  Patient needs to be seen because it has been more than one year since last visit.   Jaye LOJA RN

## 2020-05-28 ENCOUNTER — MYC MEDICAL ADVICE (OUTPATIENT)
Dept: FAMILY MEDICINE | Facility: CLINIC | Age: 65
End: 2020-05-28

## 2020-05-28 ENCOUNTER — TELEPHONE (OUTPATIENT)
Dept: FAMILY MEDICINE | Facility: CLINIC | Age: 65
End: 2020-05-28

## 2020-05-28 NOTE — TELEPHONE ENCOUNTER
Panel Management Review      Patient has the following on his problem list:     Diabetes    ASA: Passed    Last A1C  Lab Results   Component Value Date    A1C 7.0 03/11/2020    A1C 6.1 04/16/2019    A1C 5.7 10/08/2018    A1C 7.4 02/27/2018    A1C 10.6 01/11/2018     A1C tested: Passed    Last LDL:    Lab Results   Component Value Date    CHOL 172 03/11/2020     Lab Results   Component Value Date    HDL 38 03/11/2020     Lab Results   Component Value Date     03/11/2020     Lab Results   Component Value Date    TRIG 133 03/11/2020     Lab Results   Component Value Date    CHOLHDLRATIO 4.8 03/10/2014     Lab Results   Component Value Date    NHDL 134 03/11/2020       Is the patient on a Statin? YES             Is the patient on Aspirin? YES    Medications     HMG CoA Reductase Inhibitors     atorvastatin (LIPITOR) 40 MG tablet       Salicylates     aspirin 81 MG tablet             Last three blood pressure readings:  BP Readings from Last 3 Encounters:   01/16/20 122/76   08/08/19 118/68   07/11/19 138/60       Date of last diabetes office visit: 01/16/2020     Tobacco History:     History   Smoking Status     Never Smoker   Smokeless Tobacco     Never Used           Composite cancer screening  Chart review shows that this patient is due/due soon for the following None  Summary:    Patient is due/failing the following:   Eye exam, AAP, ACT and PHYSICAL    Action needed:   Patient needs to do ACT. and eye exam    Type of outreach:    Sent [employer] message.    Questions for provider review:    None                                                                                                                                    Sherley Barakat MA       Chart routed to  .

## 2020-06-02 ASSESSMENT — ASTHMA QUESTIONNAIRES: ACT_TOTALSCORE: 25

## 2020-06-21 DIAGNOSIS — J45.40 MODERATE PERSISTENT ASTHMA WITHOUT COMPLICATION: ICD-10-CM

## 2020-06-23 NOTE — TELEPHONE ENCOUNTER
Pt overdue for appt with PCP     MyChart message sent advising he is due    Routing request to PCP to review in meantime    Iraida ROBERTS RN

## 2020-07-08 DIAGNOSIS — J45.40 MODERATE PERSISTENT ASTHMA WITHOUT COMPLICATION: ICD-10-CM

## 2020-07-09 NOTE — TELEPHONE ENCOUNTER
Refill request:    FLOVENT DISKUS  MCG    Summary: USE 1 INHALATION BY MOUTH TWICE DAILY, Disp-60 each,R-0, E-Prescribe  Please schedule visit to discuss further refills ([phone]).     Start: 6/23/2020  Ord/Sold: 6/23/2020

## 2020-07-21 NOTE — PROGRESS NOTES
"[facility] - PRIMARY CARE SKIN    CC: skin cancer screening (head & neck)  SUBJECTIVE:   Rommel Bryan is a(n) 64 year old male who presents to clinic today for a skin cancer screening of the head and neck.     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    INTEGUMENTARY/SKIN: POSITIVE for changing lesions  ROS: 14 point review of systems was negative except the symptoms listed above in the HPI.        OBJECTIVE:   GENERAL: healthy, alert and no distress.  SKIN: [place] Skin Type - III.  Scalp, Face, Neck examined. The dermatoscope was used to help evaluate pigmented lesions.  Skin Pertinent Findings:  Scalp, face: Multiple brown, macule(s) most consistent with benign solar lentigo.diffuse actinic damage    Face: Some stuck-on appearing papules, raised, brown, coarse-textured, round lesion(s) most consistent with seborrheic keratoses . Brown macules consistent with solar lentigo, sebaceous hyperplasia    Significant Findings:  Left ear: well-healed scar           ASSESSMENT:     Encounter Diagnoses   Name Primary?     History of squamous cell carcinoma of skin Yes     Solar lentiginosis      Actinic skin damage      Sebaceous hyperplasia of face        PLAN:   Patient Instructions   Recheck in 6 months for skin exam    The patient was counseled about sunscreens and sun avoidance. The patient was counseled to check the skin regularly and report any lesion that is new, changing, itching, scabbing, bleeding or otherwise bothersome. The patient was discharged ambulatory and in stable " condition.    TT: 20 minutes.  CT: 15 minutes.

## 2020-07-22 ENCOUNTER — OFFICE VISIT (OUTPATIENT)
Dept: FAMILY MEDICINE | Facility: CLINIC | Age: 65
End: 2020-07-22
Payer: COMMERCIAL

## 2020-07-22 VITALS — DIASTOLIC BLOOD PRESSURE: 72 MMHG | SYSTOLIC BLOOD PRESSURE: 122 MMHG

## 2020-07-22 DIAGNOSIS — L57.8 ACTINIC SKIN DAMAGE: ICD-10-CM

## 2020-07-22 DIAGNOSIS — Z85.828 HISTORY OF SQUAMOUS CELL CARCINOMA OF SKIN: Primary | ICD-10-CM

## 2020-07-22 DIAGNOSIS — L73.8 SEBACEOUS HYPERPLASIA OF FACE: ICD-10-CM

## 2020-07-22 DIAGNOSIS — L81.4 SOLAR LENTIGINOSIS: ICD-10-CM

## 2020-07-22 PROCEDURE — 99213 OFFICE O/P EST LOW 20 MIN: CPT | Performed by: FAMILY MEDICINE

## 2020-07-22 NOTE — LETTER
"    7/22/2020         RE: Rommel Bryan  [address] [address]        Dear Colleague,    Thank you for referring your patient, Rommel Bryan, [facility]. Please see a copy of my visit note below.    [facility] - PRIMARY CARE SKIN    CC: skin cancer screening (head & neck)  SUBJECTIVE:   Rommel Bryan is a(n) 64 year old male who presents to clinic today for a skin cancer screening of the head and neck.     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    INTEGUMENTARY/SKIN: POSITIVE for changing lesions  ROS: 14 point review of systems was negative except the symptoms listed above in the HPI.        OBJECTIVE:   GENERAL: healthy, alert and no distress.  SKIN: [place] Skin Type - III.  Scalp, Face, Neck examined. The dermatoscope was used to help evaluate pigmented lesions.  Skin Pertinent Findings:  Scalp, face: Multiple brown, macule(s) most consistent with benign solar lentigo.diffuse actinic damage    Face: Some stuck-on appearing papules, raised, brown, coarse-textured, round lesion(s) most consistent with seborrheic keratoses . Brown macules consistent with solar lentigo, sebaceous hyperplasia    Significant Findings:  Left ear: well-healed scar           ASSESSMENT:     Encounter Diagnoses   Name Primary?     History of squamous cell carcinoma of skin Yes     Solar lentiginosis      Actinic skin damage      Sebaceous hyperplasia of face        PLAN:   Patient Instructions   Recheck in 6 months for skin exam    The patient " was counseled about sunscreens and sun avoidance. The patient was counseled to check the skin regularly and report any lesion that is new, changing, itching, scabbing, bleeding or otherwise bothersome. The patient was discharged ambulatory and in stable condition.    TT: 20 minutes.  CT: 15 minutes.        Again, thank you for allowing me to participate in the care of your patient.        Sincerely,        Margie Anna MD

## 2020-07-31 VITALS — WEIGHT: 275 LBS | BODY MASS INDEX: 40.73 KG/M2 | HEIGHT: 69 IN

## 2020-07-31 ASSESSMENT — MIFFLIN-ST. JEOR: SCORE: 2022.77

## 2020-07-31 NOTE — PROGRESS NOTES
"Rommel Bryan is a 65 year old male who is being evaluated via a billable video visit.      The patient has been notified of following:     \"This video visit will be conducted via a call between you and your physician/provider. We have found that certain health care needs can be provided without the need for an in-person physical exam.  This service lets us provide the care you need with a video conversation.  If a prescription is necessary we can send it directly to your pharmacy.  If lab work is needed we can place an order for that and you can then stop by our lab to have the test done at a later time.    Video visits are billed at different rates depending on your insurance coverage.  Please reach out to your insurance provider with any questions.    If during the course of the call the physician/provider feels a video visit is not appropriate, you will not be charged for this service.\"    Patient has given verbal consent for Video visit? Yes  How would you like to obtain your AVS? MyChart  If you are dropped from the video visit, the video invite should be resent to: Send to e-mail at: [email or web address]  Will anyone else be joining your video visit? No        Video-Visit Details    Type of service:  Video Visit    Video Start Time: 8:44 AM  Video End Time: 9:04 AM    Originating Location (pt. Location): Home    Distant Location (provider location):  [facility]     Platform used for Video Visit: Booker Stern MD      Sleep Consultation:    Date on this visit: 8/3/2020    Rommel Bryan is sent by No ref. provider found for a sleep consultation regarding sleep apnea.    Primary Physician: Kieran Tomlinson     Chief complaint: sleep apnea     Presenting History:     Rommel Bryan presents to clinic for management of sleep apnea.     His medical history is significant for hypertension, diabetes, obesity and asthma.     There is record of sleep study in 2008 with " an apnea hypopnea index of 14.4 per hour and RDI 16 per hour. A full report is not available for review. He is prescribed auto PAP therapy but treatment settings are not known. His device is 15 years old.     Overall, he rates the experience with PAP as 10 (0 poor, 10 great). The mask is comfortable. The mask is not leaking.  He is not snoring with the mask on. He is not having gasp arousals.  He is not having significant oral/nasal dryness. The pressure settings are comfortable.     He uses full-face mask.     Auto-PAP download:   Download is not available.     Rommel goes to sleep at 10:00 PM during the week. He wakes up at 5:30 AM without an alarm. He falls asleep in 5 minutes.  Rommel denies difficulty falling asleep.  He wakes up 1 times a night for 10 minutes before falling back to sleep.  Rommel wakes up to go to the bathroom.  On weekends, Rommel goes to sleep at 11:00 PM.  He wakes up at 9:00 AM without an alarm. He falls asleep in 10 minutes.  Patient gets an average of 7 hours of sleep per night.     Patient sleeps on his side. He denies no morning headaches and restless legs. Rommel denies any bruxism, sleep walking, sleep talking, dream enactment, sleep paralysis, cataplexy and hypnogogic/hypnopompic hallucinations.     Rommel denies difficulty breathing through his nose.      Patient's [place] Sleepiness score 4/24 consistent with no daytime sleepiness.      Rommel naps 0-1 times per week . He takes no inadvertant naps.  He denies closing eyes, dozing and falling asleep while driving.  Patient was counseled on the importance of driving while alert, to pull over if drowsy, or nap before getting into the vehicle if sleepy.      He uses 0-1 sodas/day. Last caffeine intake is usually before noon.    Allergies:    Allergies   Allergen Reactions     No Known Allergies        Medications:    Current Outpatient Medications   Medication Sig Dispense Refill     albuterol (PROAIR HFA, PROVENTIL HFA, VENTOLIN HFA) 108  (90 BASE) MCG/ACT inhaler Inhale 2 puffs into the lungs every 6 hours as needed for shortness of breath / dyspnea 1 Inhaler 1     aspirin 81 MG tablet Take 1 tablet by mouth daily.       atorvastatin (LIPITOR) 40 MG tablet TAKE 1 TABLET BY MOUTH  DAILY 30 tablet 0     blood glucose (NO BRAND SPECIFIED) test strip Use to test blood sugars bid times daily or as directed 200 strip 3     blood glucose monitoring (NO BRAND SPECIFIED) meter device kit Use to test blood sugar bidtimes daily or as directed. 1 kit 3     FLOVENT DISKUS 100 MCG/BLIST inhaler USE 1 INHALATION BY MOUTH  TWICE DAILY 60 each 0     glipiZIDE (GLUCOTROL) 5 MG tablet TAKE 1 TABLET BY MOUTH IN  THE MORNING BEFORE  BREAKFAST 30 tablet 0     lisinopril (ZESTRIL) 5 MG tablet TAKE 1 TABLET BY MOUTH  DAILY 30 tablet 0     metFORMIN (GLUCOPHAGE) 500 MG tablet Take 1 tablet (500 mg) by mouth daily (with dinner) 180 tablet 0     order for DME Equipment being ordered: CPAP mask only 1 each 0     order for DME Equipment being ordered: CPAP mask   full face mask (CPT code: a7030) and head gear (CPT code: ). 1 each 0       Problem List:  Patient Active Problem List    Diagnosis Date Noted     History of squamous cell carcinoma of skin 05/15/2019     Priority: Medium     Type 2 diabetes mellitus without complication, without long-term current use of insulin (H) 01/24/2018     Priority: Medium     Hyperglycemia 01/11/2018     Priority: Medium     Moderate persistent asthma without complication 11/09/2015     Priority: Medium     Advanced directives, counseling/discussion 01/04/2013     Priority: Medium     Discussed advance care planning with patient; however, patient declined at this time. 1/4/2013 Alexandra ESPANA MA           Hyperlipidemia LDL goal <130 01/04/2013     Priority: Medium     Family history of coronary artery disease 01/04/2013     Priority: Medium     Obesity, Class III, BMI 40-49.9 (morbid obesity) (H) 01/04/2013     Priority: Medium      Transaminasemia 01/04/2013     Priority: Medium     LYRIC (obstructive sleep apnea)      Priority: Medium        Past Medical/Surgical History:  Past Medical History:   Diagnosis Date     Asthma      Family history of coronary artery disease 1/4/2013     HTN (hypertension)      Hypercholesterolemia      Hyperlipidemia LDL goal <130 1/4/2013     Moderate persistent asthma 1/4/2013     Obesity, Class III, BMI 40-49.9 (morbid obesity) (H) 1/4/2013     LYRIC (obstructive sleep apnea)      Squamous cell carcinoma      Transaminasemia 1/4/2013     Past Surgical History:   Procedure Laterality Date     COLONOSCOPY  3/11/14     COLONOSCOPY  3/11/2014    Procedure: COLONOSCOPY;  colonoscopy;  Surgeon: Alirio Mao MD;  Location:  GI     REPAIR NERVE PRIMARY PERIPHERAL  1/2013       Social History:  Social History     Socioeconomic History     Marital status:      Spouse name: Not on file     Number of children: Not on file     Years of education: Not on file     Highest education level: Not on file   Occupational History     Occupation:  for [employer]     Employer: [employer]   Social Needs     Financial resource strain: Not on file     Food insecurity     Worry: Not on file     Inability: Not on file     Transportation needs     Medical: Not on file     Non-medical: Not on file   Tobacco Use     Smoking status: Never Smoker     Smokeless tobacco: Never Used   Substance and Sexual Activity     Alcohol use: No     Comment: maybe 3 glasses a year      Drug use: No     Sexual activity: Not Currently   Lifestyle     Physical activity     Days per week: Not on file     Minutes per session: Not on file     Stress: Not on file   Relationships     Social connections     Talks on phone: Not on file     Gets together: Not on file     Attends Zoroastrian service: Not on file     Active member of club or organization: Not on file     Attends meetings of clubs or organizations: Not on file      Relationship status: Not on file     Intimate partner violence     Fear of current or ex partner: Not on file     Emotionally abused: Not on file     Physically abused: Not on file     Forced sexual activity: Not on file   Other Topics Concern     Parent/sibling w/ CABG, MI or angioplasty before 65F 55M? Not Asked      Service No     Blood Transfusions No     Caffeine Concern Not Asked     Occupational Exposure Not Asked     Hobby Hazards Not Asked     Sleep Concern Not Asked     Stress Concern Not Asked     Weight Concern Not Asked     Special Diet Not Asked     Back Care Not Asked     Exercise No     Bike Helmet Not Asked     Seat Belt Yes     Self-Exams Not Asked   Social History Narrative    Eats fruits and vegetables every day. He has at least 3 servings of dairy per day. Vitamin D supplement recommended.       Family History:  Family History   Problem Relation Age of Onset     Neurologic Disorder Mother         alzheimer      Diabetes Mother      Hypertension Mother      Cardiovascular Father         chf      Prostate Cancer Maternal Uncle      Asthma No family hx of      Cancer - colorectal No family hx of      Anesthesia Reaction No family hx of      Blood Disease No family hx of      Eye Disorder No family hx of      -Brother has sleep apnea.     Review of Systems:  A complete review of systems reviewed by me is negative with the exeption of what has been mentioned in the history of present illness.  CONSTITUTIONAL: NEGATIVE for weight gain/loss, fever, chills, sweats or night sweats, drug allergies.  EYES: NEGATIVE for changes in vision, blind spots, double vision.  ENT: NEGATIVE for ear pain, sore throat, sinus pain, post-nasal drip, runny nose, bloody nose  CARDIAC: NEGATIVE for fast heartbeats or fluttering in chest, chest pain or pressure, breathlessness when lying flat, swollen legs or swollen feet.  NEUROLOGIC: NEGATIVE headaches, weakness or numbness in the arms or  "legs.  DERMATOLOGIC: NEGATIVE for rashes, new moles or change in mole(s)  PULMONARY:  POSITIVE for  SOB with activity  GASTROINTESTINAL: NEGATIVE for nausea or vomitting, loose or watery stools, fat or grease in stools, constipation, abdominal pain, bowel movements black in color or blood noted.  GENITOURINARY: NEGATIVE for pain during urination, blood in urine, urinating more frequently than usual, irregular menstrual periods.  MUSCULOSKELETAL: NEGATIVE for muscle pain, bone or joint pain, swollen joints.  ENDOCRINE: NEGATIVE for increased thirst or urination, diabetes.  LYMPHATIC: NEGATIVE for swollen lymph nodes, lumps or bumps in the breasts or nipple discharge.    Physical Examination:  Vitals: Ht 1.753 m (5' 9\")   Wt 124.7 kg (275 lb)   BMI 40.61 kg/m    BMI= Body mass index is 40.61 kg/m .         [place] Total Score 7/31/2020   Total score - [place] 4       DAVID Total Score: 3 (07/31/20 0920)    GENERAL APPEARANCE: healthy, alert and no distress    Impression/Plan:    1. Obstructive sleep apnea  2. Hypertension   3. Diabetes     - Patient has mild to moderate sleep apnea at baseline and is prescribed auto CPAP therapy. Baseline study result are not available for review but there is documentation of an AHI of 14.4 per hour. Subjectively, he reports that he is doing well. CPAP download monitoring is not available on his 15 years old device. Reevaluation of sleep apnea through an overnight sleep study and replacement auto- PAP device is recommended.     Plan:      1. Home sleep apnea testing     He will follow up with me in approximately two weeks after his sleep study has been competed to review the results and discuss plan of care.       Polysomnography reviewed.  Obstructive sleep apnea reviewed.  Complications of untreated sleep apnea were reviewed.    I spent a total of 20 minutes with patient with more than 50% in counseling     Dr. Tyler Stern       CC: No ref. provider found        "

## 2020-07-31 NOTE — PATIENT INSTRUCTIONS
Your BMI is Body mass index is 40.61 kg/m .  Weight management is a personal decision.  If you are interested in exploring weight loss strategies, the following discussion covers the approaches that may be successful. Body mass index (BMI) is one way to tell whether you are at a healthy weight, overweight, or obese. It measures your weight in relation to your height.  A BMI of 18.5 to 24.9 is in the healthy range. A person with a BMI of 25 to 29.9 is considered overweight, and someone with a BMI of 30 or greater is considered obese. More than two-thirds of American adults are considered overweight or obese.  Being overweight or obese increases the risk for further weight gain. Excess weight may lead to heart disease and diabetes.  Creating and following plans for healthy eating and physical activity may help you improve your health.  Weight control is part of healthy lifestyle and includes exercise, emotional health, and healthy eating habits. Careful eating habits lifelong are the mainstay of weight control. Though there are significant health benefits from weight loss, long-term weight loss with diet alone may be very difficult to achieve- studies show long-term success with dietary management in less than 10% of people. Attaining a healthy weight may be especially difficult to achieve in those with severe obesity. In some cases, medications, devices and surgical management might be considered.  What can you do?  If you are overweight or obese and are interested in methods for weight loss, you should discuss this with your provider.     Consider reducing daily calorie intake by 500 calories.     Keep a food journal.     Avoiding skipping meals, consider cutting portions instead.    Diet combined with exercise helps maintain muscle while optimizing fat loss. Strength training is particularly important for building and maintaining muscle mass. Exercise helps reduce stress, increase energy, and improves fitness.  Increasing exercise without diet control, however, may not burn enough calories to loose weight.       Start walking three days a week 10-20 minutes at a time    Work towards walking thirty minutes five days a week     Eventually, increase the speed of your walking for 1-2 minutes at time    In addition, we recommend that you review healthy lifestyles and methods for weight loss available through the National Institutes of Health patient information sites:  http://win.niddk.nih.gov/publications/index.htm    And look into health and wellness programs that may be available through your health insurance provider, employer, local community center, or kay club.    Weight management plan: Patient was referred to their PCP to discuss a diet and exercise plan.

## 2020-08-03 ENCOUNTER — VIRTUAL VISIT (OUTPATIENT)
Dept: SLEEP MEDICINE | Facility: CLINIC | Age: 65
End: 2020-08-03
Payer: COMMERCIAL

## 2020-08-03 DIAGNOSIS — G47.33 OSA (OBSTRUCTIVE SLEEP APNEA): Primary | ICD-10-CM

## 2020-08-03 PROCEDURE — 99203 OFFICE O/P NEW LOW 30 MIN: CPT | Mod: 95 | Performed by: INTERNAL MEDICINE

## 2020-08-10 DIAGNOSIS — E11.9 TYPE 2 DIABETES MELLITUS WITHOUT COMPLICATION, WITHOUT LONG-TERM CURRENT USE OF INSULIN (H): ICD-10-CM

## 2020-08-11 ENCOUNTER — VIRTUAL VISIT (OUTPATIENT)
Dept: FAMILY MEDICINE | Facility: CLINIC | Age: 65
End: 2020-08-11
Payer: COMMERCIAL

## 2020-08-11 DIAGNOSIS — J45.40 MODERATE PERSISTENT ASTHMA WITHOUT COMPLICATION: Primary | ICD-10-CM

## 2020-08-11 DIAGNOSIS — Z85.828 HISTORY OF SQUAMOUS CELL CARCINOMA OF SKIN: ICD-10-CM

## 2020-08-11 DIAGNOSIS — E11.9 TYPE 2 DIABETES MELLITUS WITHOUT COMPLICATION, WITHOUT LONG-TERM CURRENT USE OF INSULIN (H): ICD-10-CM

## 2020-08-11 DIAGNOSIS — G47.33 OSA (OBSTRUCTIVE SLEEP APNEA): ICD-10-CM

## 2020-08-11 PROCEDURE — 99214 OFFICE O/P EST MOD 30 MIN: CPT | Mod: 95 | Performed by: INTERNAL MEDICINE

## 2020-08-11 NOTE — PROGRESS NOTES
"Rommel Bryan is a 65 year old male who is being evaluated via a billable video visit.      The patient has been notified of following:     \"This video visit will be conducted via a call between you and your physician/provider. We have found that certain health care needs can be provided without the need for an in-person physical exam.  This service lets us provide the care you need with a video conversation.  If a prescription is necessary we can send it directly to your pharmacy.  If lab work is needed we can place an order for that and you can then stop by our lab to have the test done at a later time.    Video visits are billed at different rates depending on your insurance coverage.  Please reach out to your insurance provider with any questions.    If during the course of the call the physician/provider feels a video visit is not appropriate, you will not be charged for this service.\"    Patient has given verbal consent for Video visit? Yes  How would you like to obtain your AVS? MyChart  If you are dropped from the video visit, the video invite should be resent to: Text to cell phone: [phone] if unable to get into  MY CHART Virtual Waiting Room   Will anyone else be joining your video visit? No      Subjective     Rommel Bryan is a 65 year old male who presents today via video visit for the following health issues .  Patient has not been seen for over a year and missed his yearly follow-up this spring because of the pandemic.    HPI    Chief Complaint   Patient presents with     Recheck Medication     asthma & diabetic    Patient reports that because of the pandemic he has been working at home and his daily exercise is walking from his office to the refrigerator.  He has had an associated 30+ pound weight gain.  He has been checking his blood sugars in the a.m. only and usually gets up around 138.  He has not seen his ophthalmologist.  He denies any problems with his feet including " sores or paresthesias.    He has had no significant problems with his asthma and continues to take his Flovent regularly.  He has been doing some regular gardening and this is been exacerbated by seasonal allergies.    Associated with gardening he has had some tenderness in his upper abdomen recently strained a muscle related to prolonged positioning.    Follow-up medications reviewed.  We discussed refills and he will report back regarding where he wants to have his prescription filled through his insurance company, [employer].    Video Start Time: 6:25 PM        Current Outpatient Medications   Medication Sig Dispense Refill     albuterol (PROAIR HFA, PROVENTIL HFA, VENTOLIN HFA) 108 (90 BASE) MCG/ACT inhaler Inhale 2 puffs into the lungs every 6 hours as needed for shortness of breath / dyspnea 1 Inhaler 1     aspirin 81 MG tablet Take 1 tablet by mouth daily.       atorvastatin (LIPITOR) 40 MG tablet TAKE 1 TABLET BY MOUTH  DAILY 30 tablet 0     blood glucose (NO BRAND SPECIFIED) test strip Use to test blood sugars bid times daily or as directed 200 strip 3     blood glucose monitoring (NO BRAND SPECIFIED) meter device kit Use to test blood sugar bidtimes daily or as directed. 1 kit 3     FLOVENT DISKUS 100 MCG/BLIST inhaler USE 1 INHALATION BY MOUTH  TWICE DAILY 60 each 0     glipiZIDE (GLUCOTROL) 5 MG tablet TAKE 1 TABLET BY MOUTH IN  THE MORNING BEFORE  BREAKFAST 30 tablet 0     lisinopril (ZESTRIL) 5 MG tablet TAKE 1 TABLET BY MOUTH  DAILY 30 tablet 0     metFORMIN (GLUCOPHAGE) 500 MG tablet Take 1 tablet (500 mg) by mouth daily (with dinner) 180 tablet 0     order for DME Equipment being ordered: CPAP mask only 1 each 0     order for DME Equipment being ordered: CPAP mask   full face mask (CPT code: a7030) and head gear (CPT code: ). 1 each 0     Allergies   Allergen Reactions     No Known Allergies        Reviewed and updated as needed this visit by Provider         Review of Systems   Constitutional,  HEENT, cardiovascular, pulmonary, gi and gu systems are negative, except as otherwise noted.      Objective           Vitals:  No vitals were obtained today due to virtual visit.    Physical Exam     GENERAL: Healthy, alert and no distress  EYES: Eyes grossly normal to inspection.  No discharge or erythema, or obvious scleral/conjunctival abnormalities.  RESP: No audible wheeze, cough, or visible cyanosis.  No visible retractions or increased work of breathing.    SKIN: Visible skin clear. No significant rash, abnormal pigmentation or lesions.  NEURO: Cranial nerves grossly intact.  Mentation and speech appropriate for age.  PSYCH: Mentation appears normal, affect normal/bright, judgement and insight intact, normal speech and appearance well-groomed.              Assessment & Plan     1. Moderate persistent asthma without complication  Continue with Flovent regularly, albuterol as needed and increase his regular aerobic activity.    2. Type 2 diabetes mellitus without complication, without long-term current use of insulin (H)  The patient has requested to add increasing activity as well as dietary discretion.  We discussed that waiting till a vaccine for COVID-19 before coming to the office to follow-up on his health was inappropriate and to consolidate his exposure the plan would be to have him come in September to have a wellness exam and receive his flu vaccine.  Is also requested to see the ophthalmologist this fall as well.    3. LYRIC (obstructive sleep apnea)  Using CPAP    4. History of squamous cell carcinoma of skin  Evaluated during wellness exam       We will follow-up on medications when the patient (through the proper source for refill.  FUTURE APPOINTMENTS:       - Follow-up visit in 6 weeks    No follow-ups on file.    Kieran Tomlinson MD  [facility]      Video-Visit Details    Type of service:  Video Visit    Video End Time:6:56 PM    Originating Location (pt. Location): Home    Distant  Location (provider location):  [facility]     Platform used for Video Visit: Sharri    No follow-ups on file.       Kieran Tomlinson MD

## 2020-08-12 NOTE — TELEPHONE ENCOUNTER
Prescription approved per [facility] Refill Protocol.  Jaye LOJA RN

## 2020-08-13 ENCOUNTER — TELEPHONE (OUTPATIENT)
Dept: FAMILY MEDICINE | Facility: CLINIC | Age: 65
End: 2020-08-13

## 2020-08-13 NOTE — TELEPHONE ENCOUNTER
Please abstract the following data from this visit with this patient into the appropriate field in Epic:    Tests that can be patient reported without a hard copy:    Eye exam with ophthalmology on this date: 09/17/2018 at [place] [facility]

## 2020-08-14 NOTE — TELEPHONE ENCOUNTER
Elias Morton,  Thank you for your message below. We only go back 1 year(2019) on abstracting patient reported diabetic eye exams for our quality initiatives so we would not be abstracting this one for you.  If we can get a copy of the paper report scanned into Epic, or get the test pulled on Care Everywhere, then we can abstract it for you. Please let us know if you have any questions about this.  Thank you,  Yumiko, Abstracting Team

## 2020-09-15 DIAGNOSIS — E78.5 HYPERLIPIDEMIA LDL GOAL <130: ICD-10-CM

## 2020-09-15 DIAGNOSIS — E11.9 TYPE 2 DIABETES MELLITUS WITHOUT COMPLICATION, WITHOUT LONG-TERM CURRENT USE OF INSULIN (H): ICD-10-CM

## 2020-09-15 RX ORDER — ATORVASTATIN CALCIUM 40 MG/1
TABLET, FILM COATED ORAL
Qty: 30 TABLET | Refills: 3 | Status: SHIPPED | OUTPATIENT
Start: 2020-09-15 | End: 2021-01-09

## 2020-09-15 RX ORDER — LISINOPRIL 5 MG/1
TABLET ORAL
Qty: 30 TABLET | Refills: 3 | Status: SHIPPED | OUTPATIENT
Start: 2020-09-15 | End: 2021-01-09

## 2020-09-15 NOTE — TELEPHONE ENCOUNTER
Routing refill request to provider for review/approval because:  Labs not current:  A1c    HARPREET ArnoldN, RN  [employer] Triage

## 2020-09-16 RX ORDER — GLIPIZIDE 5 MG/1
TABLET ORAL
Qty: 30 TABLET | Refills: 11 | Status: SHIPPED | OUTPATIENT
Start: 2020-09-16 | End: 2021-07-08

## 2020-10-07 ENCOUNTER — NURSE TRIAGE (OUTPATIENT)
Dept: NURSING | Facility: CLINIC | Age: 65
End: 2020-10-07

## 2020-10-07 NOTE — TELEPHONE ENCOUNTER
Patient calling. He is reporting he is experiencing increased symptoms from his normal bout with asthma.    He reports , heavier breathing, coughing a lot, no fever, had diarrhea last week , and a headache.    He was supplied with the [facility] website., and advised to schedule test for Covid 19.    Patient agreed to this.    Margie Chin RN on 10/7/2020 at 2:29 PM    COVID 19 Nurse Triage Plan/Patient Instructions    Please be aware that novel coronavirus (COVID-19) may be circulating in the community. If you develop symptoms such as fever, cough, or SOB or if you have concerns about the presence of another infection including coronavirus (COVID-19), please contact your health care provider or visit [email or web address].     Disposition/Instructions    Virtual Visit with provider recommended. Reference Visit Selection Guide.    Thank you for taking steps to prevent the spread of this virus.  o Limit your contact with others.  o Wear a simple mask to cover your cough.  o Wash your hands well and often.    Resources    [facility]: About COVID-19: [email or web address]    CDC: What to Do If You're Sick: www.cdc.gov/coronavirus/2019-ncov/about/steps-when-sick.html    CDC: Ending Home Isolation: www.cdc.gov/coronavirus/2019-ncov/hcp/disposition-in-home-patients.html     CDC: Caring for Someone: www.cdc.gov/coronavirus/2019-ncov/if-you-are-sick/care-for-someone.html     [facility]: Interim Guidance for Hospital Discharge to Home: www.health.[facility].mn.us/diseases/coronavirus/hcp/hospdischarge.pdf    [facility] clinical trials (COVID-19 research studies): clinicalaffairs.[facility]-clinical-trials     Below are the COVID-19 hotlines at the [place] Department of Health ([facility]). Interpreters are available.   o For health questions: Call 228-644-9752 or 1-294.309.1558 (7 a.m. to 7 p.m.)  o For questions about schools and childcare: Call 388-079-2842 or 1-632.682.1973 (7 a.m. to 7 p.m.)                                    Reason for Disposition    [1] COVID-19 infection suspected by caller or triager AND [2] mild symptoms (cough, fever, or others) AND [3] no complications or SOB    Additional Information    Negative: SEVERE difficulty breathing (e.g., struggling for each breath, speaks in single words)    Negative: Difficult to awaken or acting confused (e.g., disoriented, slurred speech)    Negative: Bluish (or gray) lips or face now    Negative: Shock suspected (e.g., cold/pale/clammy skin, too weak to stand, low BP, rapid pulse)    Negative: Sounds like a life-threatening emergency to the triager    Negative: [1] COVID-19 exposure AND [2] no symptoms    Negative: COVID-19 and Breastfeeding, questions about    Negative: [1] Adult with possible COVID-19 symptoms AND [2] triager concerned about severity of symptoms or other causes    Negative: SEVERE or constant chest pain or pressure (Exception: mild central chest pain, present only when coughing)    Negative: MODERATE difficulty breathing (e.g., speaks in phrases, SOB even at rest, pulse 100-120)    Negative: MILD difficulty breathing (e.g., minimal/no SOB at rest, SOB with walking, pulse <100)    Negative: Chest pain or pressure    Negative: Fever > 103 F (39.4 C)    Negative: [1] Fever > 101 F (38.3 C) AND [2] age > 60    Negative: [1] Fever > 100.0 F (37.8 C) AND [2] bedridden (e.g., nursing home patient, CVA, chronic illness, recovering from surgery)    Negative: HIGH RISK patient (e.g., age > 64 years, diabetes, heart or lung disease, weak immune system) (Exception: Has already been evaluated by healthcare provider and has no new or worsening symptoms)    Negative: Fever present > 3 days (72 hours)    Negative: [1] Fever returns after gone for over 24 hours AND [2] symptoms worse or not improved    Negative: [1] Continuous (nonstop) coughing interferes with work or school AND [2] no improvement using cough treatment per protocol    Protocols  used: CORONAVIRUS (COVID-19) DIAGNOSED OR [id number] 8.4.20

## 2020-10-30 DIAGNOSIS — E11.9 TYPE 2 DIABETES MELLITUS WITHOUT COMPLICATION, WITHOUT LONG-TERM CURRENT USE OF INSULIN (H): ICD-10-CM

## 2020-11-02 NOTE — TELEPHONE ENCOUNTER
Routing refill request to provider for review/approval because:  Labs not current:  A1c    Please review and authorize if appropriate.    Thank you,   Ming GRAJEDA RN

## 2020-11-05 ENCOUNTER — NURSE TRIAGE (OUTPATIENT)
Dept: NURSING | Facility: CLINIC | Age: 65
End: 2020-11-05

## 2020-11-05 ENCOUNTER — VIRTUAL VISIT (OUTPATIENT)
Dept: FAMILY MEDICINE | Facility: OTHER | Age: 65
End: 2020-11-05

## 2020-11-05 NOTE — TELEPHONE ENCOUNTER
"SX began 2 weeks ago: getting worse. Runny nose, headache=\"mild\"=1-2\", cough, shortness of breath and diarrhea (varies, sometimes none, sometimes twice a day). SOB when he lies down @ night. He wears a CPAP which he states seems to help. He has mild chest pressure. Frequent cough: sometimes productive.   Hx of diabetes and asthma.   He would like to be tested for COVID.     Triaged to a disposition of Call PCP now. Also discussed option of [email or web address]--which he intends to do now.     Reason for Disposition    MILD difficulty breathing (e.g., minimal/no SOB at rest, SOB with walking, pulse <100)    Chest pain or pressure    HIGH RISK patient (e.g., age > 64 years, diabetes, heart or lung disease, weak immune system) (Exception: Has already been evaluated by healthcare provider and has no new or worsening symptoms)    Additional Information    Negative: SEVERE difficulty breathing (e.g., struggling for each breath, speaks in single words)    Negative: Difficult to awaken or acting confused (e.g., disoriented, slurred speech)    Negative: Bluish (or gray) lips or face now    Negative: Shock suspected (e.g., cold/pale/clammy skin, too weak to stand, low BP, rapid pulse)    Negative: Sounds like a life-threatening emergency to the triager    Negative: [1] COVID-19 exposure AND [2] no symptoms    Negative: COVID-19 and Breastfeeding, questions about    Negative: [1] Adult with possible COVID-19 symptoms AND [2] triager concerned about severity of symptoms or other causes    Negative: SEVERE or constant chest pain or pressure (Exception: mild central chest pain, present only when coughing)    Negative: MODERATE difficulty breathing (e.g., speaks in phrases, SOB even at rest, pulse 100-120)    Negative: Patient sounds very sick or weak to the triager    Protocols used: CORONAVIRUS (COVID-19) DIAGNOSED OR [id number]-HS 8.4.20    COVID 19 Nurse Triage Plan/Patient Instructions    Please be aware that novel coronavirus " (COVID-19) may be circulating in the community. If you develop symptoms such as fever, cough, or SOB or if you have concerns about the presence of another infection including coronavirus (COVID-19), please contact your health care provider or visit [email or web address].     Disposition/Instructions    Virtual Visit with provider recommended. Reference Visit Selection Guide.    Thank you for taking steps to prevent the spread of this virus.  o Limit your contact with others.  o Wear a simple mask to cover your cough.  o Wash your hands well and often.    Resources    [facility]: About COVID-19: [email or web address].[email or web address]/    CDC: What to Do If You're Sick: www.cdc.gov/coronavirus/2019-ncov/about/steps-when-sick.html    CDC: Ending Home Isolation: www.cdc.gov/coronavirus/2019-ncov/hcp/disposition-in-home-patients.html     CDC: Caring for Someone: www.cdc.gov/coronavirus/2019-ncov/if-you-are-sick/care-for-someone.html     [facility]: Interim Guidance for Hospital Discharge to Home: www.[facility].mn.us/diseases/coronavirus/hcp/hospdischarge.pdf    [facility] clinical trials (COVID-19 research studies): clinicalaffairs.[facility]-clinical-trials     Below are the COVID-19 hotlines at the [place] Department of Health ([facility]). Interpreters are available.   o For health questions: Call 407-966-2697 or 1-504.929.1253 (7 a.m. to 7 p.m.)  o For questions about schools and childcare: Call 028-254-3453 or 1-122.661.4670 (7 a.m. to 7 p.m.)

## 2020-11-05 NOTE — PROGRESS NOTES
"Date: 2020 16:13:43  Clinician: Diego Caro  Clinician NPI: [id number]  Patient: Rommel Bryan  Patient : 1955  Patient Address: [facility] [address], [place] [address]  Patient Phone: [phone]  Visit Protocol: URI  Patient Summary:  Rommel is a 65 year old ( : 1955 ) male who initiated a [facility] Visit for COVID-19 (Coronavirus) evaluation and screening. When asked the question \"Please sign me up to receive news, health information and promotions. \", Rommel responded \"No\".    Rommel states his symptoms started gradually 7-9 days ago.   His symptoms consist of rhinitis, malaise, a sore throat, diarrhea, a headache, wheezing, a cough, and nasal congestion. He is experiencing mild difficulty breathing with activities but can speak normally in full sentences.   Symptom details     Nasal secretions: The color of his mucus is white.    Cough: Rommel coughs every 5-10 minutes and his cough is more bothersome at night. Phlegm comes into his throat when he coughs. He does not believe his cough is caused by post-nasal drip. The color of the phlegm is white.     Sore throat: Rommel reports having mild throat pain (1-3 on a 10 point pain scale), does not have exudate on his tonsils, and can swallow liquids. He is not sure if the lymph nodes in his neck are enlarged. A rash has not appeared on the skin since the sore throat started.     Wheezing: Rommel has been diagnosed with asthma. Additional wheezing details as reported by the patient (free text): Squeaks       Headache: He states the headache is mild (1-3 on a 10 point pain scale).      Rommel denies having vomiting, facial pain or pressure, myalgias, chills, teeth pain, ageusia, ear pain, fever, nausea, and anosmia. He also denies taking antibiotic medication in the past month, having recent facial or sinus surgery in the past 60 days, and double sickening (worsening symptoms after initial improvement).   Precipitating events  Within the past week, Rommel " has not been exposed to someone with strep throat. He has not recently been exposed to someone with influenza. Rommel has been in close contact with the following high risk individuals: adults 65 or older.   Pertinent COVID-19 (Coronavirus) information  oRmmel does not work or volunteer as healthcare worker or a . In the past 14 days, Rommel has not worked or volunteered at a healthcare facility or group living setting.   In the past 14 days, he also has not lived in a congregate living setting.   Rommel has not had a close contact with a laboratory-confirmed COVID-19 patient within 14 days of symptom onset.    Since December 2019, Rommel has not been tested for COVID-19 and has not had upper respiratory infection or influenza-like illness.   Pertinent medical history  Rommel does not need a return to work/school note.   Weight: 265 lbs   Rommel does not smoke or use smokeless tobacco.   Weight: 265 lbs    MEDICATIONS: glipizide-metformin oral, Flovent Diskus inhalation, ALLERGIES: NKDA  Clinician Response:  Dear Rommel,   Your symptoms show that you may have coronavirus (COVID-19). This illness can cause fever, cough and trouble breathing. Many people get a mild case and get better on their own. Some people can get very sick.  What should I do?  We would like to test you for this virus.   1. Please call [phone] to schedule your visit. Explain that you were referred by [facility] to have a COVID-19 test. Be ready to share your [facility] visit ID number.  * If you need to schedule in [facility] please call [phone] or for [employer] employees please call [phone].  * If you need to schedule in the [place] area please call [phone]. [employer] employees call [phone].  The following will serve as your written order for this COVID Test, ordered by me, for the indication of suspected COVID [Z20.828]: The test will be ordered in [employer], our electronic health record, after you are scheduled. It will show  "as ordered and authorized by Leonel Cooper MD.  Order: COVID-19 (Coronavirus) PCR for SYMPTOMATIC testing from [facility].   2. When it's time for your COVID test:  Stay at least 6 feet away from others. (If someone will drive you to your test, stay in the backseat, as far away from the  as you can.)   Cover your mouth and nose with a mask, tissue or washcloth.  Go straight to the testing site. Don't make any stops on the way there or back.      3.Starting now: Stay home and away from others (self-isolate) until:   You've had no fever---and no medicine that reduces fever---for one full day (24 hours). And...   Your other symptoms have gotten better. For example, your cough or breathing has improved. And...   At least 10 days have passed since your symptoms started.       During this time, don't leave the house except for testing or medical care.   Stay in your own room, even for meals. Use your own bathroom if you can.   Stay away from others in your home. No hugging, kissing or shaking hands. No visitors.  Don't go to work, school or anywhere else.    Clean \"high touch\" surfaces often (doorknobs, counters, handles, etc.). Use a household cleaning spray or wipes. You'll find a full list of  on the EPA website: www.epa.gov/pesticide-registration/list-n-disinfectants-use-against-sars-cov-2.   Cover your mouth and nose with a mask, tissue or washcloth to avoid spreading germs.  Wash your hands and face often. Use soap and water.  Caregivers in these groups are at risk for severe illness due to COVID-19:  o People 65 years and older  o People who live in a nursing home or long-term care facility  o People with chronic disease (lung, heart, cancer, diabetes, kidney, liver, immunologic)  o People who have a weakened immune system, including those who:   Are in cancer treatment  Take medicine that weakens the immune system, such as corticosteroids  Had a bone marrow or organ transplant  Have an immune deficiency  " Have poorly controlled HIV or AIDS  Are obese (body mass index of 40 or higher)  Smoke regularly   o Caregivers should wear gloves while washing dishes, handling laundry and cleaning bedrooms and bathrooms.  o Use caution when washing and drying laundry: Don't shake dirty laundry, and use the warmest water setting that you can.  o For more tips, go to www.cdc.gov/coronavirus/2019-ncov/downloads/10Things.pdf.    How can I take care of myself?    Get lots of rest. Drink extra fluids (unless a doctor has told you not to).   Take Tylenol (acetaminophen) for fever or pain. If you have liver or kidney problems, ask your family doctor if it's okay to take Tylenol.   Adults can take either:    650 mg (two 325 mg pills) every 4 to 6 hours, or...   1,000 mg (two 500 mg pills) every 8 hours as needed.    Note: Don't take more than 3,000 mg in one day. Acetaminophen is found in many medicines (both prescribed and over-the-counter medicines). Read all labels to be sure you don't take too much.   For children, check the Tylenol bottle for the right dose. The dose is based on the child's age or weight.    If you have other health problems (like cancer, heart failure, an organ transplant or severe kidney disease): Call your specialty clinic if you don't feel better in the next 2 days.       Know when to call 911. Emergency warning signs include:    Trouble breathing or shortness of breath Pain or pressure in the chest that doesn't go away Feeling confused like you haven't felt before, or not being able to wake up Bluish-colored lips or face.  Where can I get more information?   [facility] -- About COVID-19: [email or web address]   CDC -- What to Do If You're Sick: www.cdc.gov/coronavirus/2019-ncov/about/steps-when-sick.html   CDC -- Ending Home Isolation: www.cdc.gov/coronavirus/2019-ncov/hcp/disposition-in-home-patients.html   CDC -- Caring for Someone:  www.cdc.gov/coronavirus/2019-ncov/if-you-are-sick/care-for-someone.html   [facility] -- Interim Guidance for Hospital Discharge to Home: www.health.[facility].mn.us/diseases/coronavirus/hcp/hospdischarge.pdf   [facility] clinical trials (COVID-19 research studies): clinicalaffairs.[facility]-clinical-trials    Below are the COVID-19 hotlines at the [place] Department of Health ([facility]). Interpreters are available.    For health questions: Call 549-566-2931 or 1-171.966.8038 (7 a.m. to 7 p.m.) For questions about schools and childcare: Call 668-952-5256 or 1-277.254.6719 (7 a.m. to 7 p.m.)    Diagnosis: Contact with and (suspected) exposure to other viral communicable diseases  Diagnosis ICD: Z20.828

## 2020-11-10 DIAGNOSIS — Z20.822 SUSPECTED COVID-19 VIRUS INFECTION: Primary | ICD-10-CM

## 2020-11-11 DIAGNOSIS — Z20.822 SUSPECTED COVID-19 VIRUS INFECTION: ICD-10-CM

## 2020-11-11 PROCEDURE — U0003 INFECTIOUS AGENT DETECTION BY NUCLEIC ACID (DNA OR RNA); SEVERE ACUTE RESPIRATORY SYNDROME CORONAVIRUS 2 (SARS-COV-2) (CORONAVIRUS DISEASE [COVID-19]), AMPLIFIED PROBE TECHNIQUE, MAKING USE OF HIGH THROUGHPUT TECHNOLOGIES AS DESCRIBED BY CMS-2020-01-R: HCPCS | Performed by: PHYSICIAN ASSISTANT

## 2020-11-12 LAB
SARS-COV-2 RNA SPEC QL NAA+PROBE: NOT DETECTED
SPECIMEN SOURCE: NORMAL

## 2020-11-14 ENCOUNTER — HEALTH MAINTENANCE LETTER (OUTPATIENT)
Age: 65
End: 2020-11-14

## 2020-11-19 ENCOUNTER — TELEPHONE (OUTPATIENT)
Dept: FAMILY MEDICINE | Facility: CLINIC | Age: 65
End: 2020-11-19

## 2020-11-19 NOTE — TELEPHONE ENCOUNTER
Reason for call:  Patient reporting a symptom    Symptom or request: Sore Throat/ Cough     Duration (how long have symptoms been present): Over a month now     Have you been treated for this before? Yes- Pt got [facility] for Cvoid last Week on Wednesdays . Pt stated that his Result was Negative but Pt ts still having these Symptom.     Additional comments: Pt would like to come in clinic to make sure he dont have step throat or Etc. Pt would like to talk Pt Asthma plus with that. He would like to come in before his appt to get labs done. Please call Pt back if Pt is going to be doing VRT or Clinic.     Phone Number patient can be reached at:  Home number on file [phone] (home)    Best Time:  Anytime     Can we leave a detailed message on this number:  YES    Call taken on 11/19/2020 at 2:53 PM by Jerica Powers

## 2020-11-20 ENCOUNTER — OFFICE VISIT (OUTPATIENT)
Dept: FAMILY MEDICINE | Facility: CLINIC | Age: 65
End: 2020-11-20
Payer: COMMERCIAL

## 2020-11-20 VITALS
TEMPERATURE: 97.3 F | DIASTOLIC BLOOD PRESSURE: 81 MMHG | WEIGHT: 275 LBS | OXYGEN SATURATION: 95 % | BODY MASS INDEX: 40.73 KG/M2 | HEART RATE: 82 BPM | SYSTOLIC BLOOD PRESSURE: 143 MMHG | HEIGHT: 69 IN

## 2020-11-20 DIAGNOSIS — R06.2 WHEEZING: Primary | ICD-10-CM

## 2020-11-20 DIAGNOSIS — J45.40 MODERATE PERSISTENT ASTHMA WITHOUT COMPLICATION: ICD-10-CM

## 2020-11-20 PROCEDURE — 99213 OFFICE O/P EST LOW 20 MIN: CPT | Performed by: NURSE PRACTITIONER

## 2020-11-20 RX ORDER — PREDNISONE 20 MG/1
40 TABLET ORAL DAILY
Qty: 10 TABLET | Refills: 0 | Status: SHIPPED | OUTPATIENT
Start: 2020-11-20 | End: 2021-10-26

## 2020-11-20 RX ORDER — PREDNISONE 20 MG/1
40 TABLET ORAL DAILY
Qty: 10 TABLET | Refills: 0 | Status: SHIPPED | OUTPATIENT
Start: 2020-11-20 | End: 2020-11-20

## 2020-11-20 ASSESSMENT — MIFFLIN-ST. JEOR: SCORE: 2022.77

## 2020-11-20 NOTE — PROGRESS NOTES
Subjective     Rommel Bryan is a 65 year old male who presents to clinic today for the following health issues:    HPI         Cough sore throat for 1 month  Thinks he actually started symptoms late summer   Had furnace replaced late summer   Everyone in the house has a cough: wife and mother in law   Productive of white mucus   occaionally runny nose   Sore throat for most of the illness. Possibly from drainage   No fevers      Past Medical History:   Diagnosis Date     Asthma      Family history of coronary artery disease 2013     HTN (hypertension)      Hypercholesterolemia      Hyperlipidemia LDL goal <130 2013     Moderate persistent asthma 2013     Obesity, Class III, BMI 40-49.9 (morbid obesity) (H) 2013     LYRIC (obstructive sleep apnea)      Squamous cell carcinoma      Transaminasemia 2013     Family History   Problem Relation Age of Onset     Neurologic Disorder Mother         alzheimer      Diabetes Mother      Hypertension Mother      Cardiovascular Father         chf      Prostate Cancer Maternal Uncle      Asthma No family hx of      Cancer - colorectal No family hx of      Anesthesia Reaction No family hx of      Blood Disease No family hx of      Eye Disorder No family hx of      Past Surgical History:   Procedure Laterality Date     COLONOSCOPY  3/11/14     COLONOSCOPY  3/11/2014    Procedure: COLONOSCOPY;  colonoscopy;  Surgeon: Alirio Mao MD;  Location:  GI     REPAIR NERVE PRIMARY PERIPHERAL  2013     Social History     Tobacco Use     Smoking status: Never Smoker     Smokeless tobacco: Never Used   Substance Use Topics     Alcohol use: Yes     Comment: maybe 1-2  glasses a year  special occasion      Current Outpatient Medications   Medication Sig Dispense Refill     albuterol (PROAIR HFA, PROVENTIL HFA, VENTOLIN HFA) 108 (90 BASE) MCG/ACT inhaler Inhale 2 puffs into the lungs every 6 hours as needed for shortness of breath / dyspnea  "1 Inhaler 1     aspirin 81 MG tablet Take 1 tablet by mouth daily.       atorvastatin (LIPITOR) 40 MG tablet TAKE 1 TABLET BY MOUTH  DAILY 30 tablet 3     blood glucose (NO BRAND SPECIFIED) test strip Use to test blood sugars bid times daily or as directed 200 strip 3     blood glucose monitoring (NO BRAND SPECIFIED) meter device kit Use to test blood sugar bidtimes daily or as directed. 1 kit 3     FLOVENT DISKUS 100 MCG/BLIST inhaler USE 1 INHALATION BY MOUTH  TWICE DAILY 60 each 0     glipiZIDE (GLUCOTROL) 5 MG tablet TAKE 1 TABLET BY MOUTH IN  THE MORNING BEFORE  BREAKFAST 30 tablet 11     lisinopril (ZESTRIL) 5 MG tablet TAKE 1 TABLET BY MOUTH  DAILY 30 tablet 3     metFORMIN (GLUCOPHAGE) 500 MG tablet TAKE 1 TABLET BY MOUTH  DAILY (WITH DINNER) 90 tablet 3     order for DME Equipment being ordered: CPAP mask only 1 each 0     order for DME Equipment being ordered: CPAP mask   full face mask (CPT code: a7030) and head gear (CPT code: ). 1 each 0     Allergies   Allergen Reactions     No Known Allergies        Reviewed and updated as needed this visit by clinical staff and provider     Review of Systems   Detailed as above       Objective    BP (!) 143/81 (BP Location: Left arm, Patient Position: Chair, Cuff Size: Adult Large)   Pulse 82   Temp 97.3  F (36.3  C) (Oral)   Ht 1.753 m (5' 9\")   Wt 124.7 kg (275 lb)   SpO2 95%   BMI 40.61 kg/m      Physical Exam  Constitutional:       Appearance: He is well-developed.   HENT:      Head: Normocephalic.      Right Ear: Tympanic membrane, ear canal and external ear normal.      Left Ear: Tympanic membrane, ear canal and external ear normal.      Nose: Congestion present.      Comments: Slight edema of right naris with mild excoriation and less so on left     Mouth/Throat:      Pharynx: No oropharyngeal exudate.   Eyes:      Conjunctiva/sclera: Conjunctivae normal.   Neck:      Musculoskeletal: Normal range of motion.   Cardiovascular:      Rate and Rhythm: " Normal rate and regular rhythm.      Heart sounds: Normal heart sounds. No murmur.   Pulmonary:      Effort: Pulmonary effort is normal. No respiratory distress.      Breath sounds: Wheezing (throughout) present.   Musculoskeletal: Normal range of motion.   Lymphadenopathy:      Cervical: No cervical adenopathy.   Skin:     General: Skin is warm and dry.   Neurological:      Mental Status: He is alert.   Psychiatric:         Mood and Affect: Mood normal.                Assessment & Plan     Moderate persistent asthma without complication  - fluticasone (FLOVENT DISKUS) 250 MCG/BLIST inhaler; Inhale 1 puff into the lungs every 12 hours    Wheezing  - predniSONE (DELTASONE) 20 MG tablet; Take 2 tablets (40 mg) by mouth daily        Persistent URI symptoms. ddx allergies, viral, less likely bacterial. Will increase flovent dosing as he was previously on the higher dose. If this doesn't help he can take prednisone that is sent to his pharmacy.   He should also use Flonase daily and take Claritin.   Call if any symptoms are worsening        No follow-ups on file.    CHETAN [facility]

## 2020-11-20 NOTE — PATIENT INSTRUCTIONS
Switch to the higher dose flovent     If this does not help then start the prednisone (steroid pills)    Use Flonase or Nasacort daily     Let me know if you develop any worsening symptoms

## 2020-12-09 DIAGNOSIS — G47.33 OBSTRUCTIVE SLEEP APNEA (ADULT) (PEDIATRIC): Primary | ICD-10-CM

## 2021-01-09 DIAGNOSIS — E11.9 TYPE 2 DIABETES MELLITUS WITHOUT COMPLICATION, WITHOUT LONG-TERM CURRENT USE OF INSULIN (H): ICD-10-CM

## 2021-01-09 DIAGNOSIS — E78.5 HYPERLIPIDEMIA LDL GOAL <130: ICD-10-CM

## 2021-01-09 NOTE — TELEPHONE ENCOUNTER
Atorvastatin 40 mg tablet    Summary: TAKE 1 TABLET BY MOUTH DAILY, Disp-30 tablet, R-3, E-Prescribe  Requesting 1 year supply     Start: 9/15/2020  Ord/Sold: 9/15/2020    Lisinopril 5 mg tablet    Summary: TAKE 1 TABLET BY MOUTH DAILY, Disp-30 tablet, R-3, E-Prescribe  Requesting 1 year supply     Start: 9/15/2020  Ord/Sold: 9/15/2020

## 2021-01-11 RX ORDER — LISINOPRIL 5 MG/1
5 TABLET ORAL DAILY
Qty: 90 TABLET | Refills: 3 | Status: SHIPPED | OUTPATIENT
Start: 2021-01-11 | End: 2022-01-12

## 2021-01-11 RX ORDER — ATORVASTATIN CALCIUM 40 MG/1
40 TABLET, FILM COATED ORAL DAILY
Qty: 90 TABLET | Refills: 3 | Status: SHIPPED | OUTPATIENT
Start: 2021-01-11 | End: 2021-09-13

## 2021-01-29 ENCOUNTER — TELEPHONE (OUTPATIENT)
Dept: FAMILY MEDICINE | Facility: CLINIC | Age: 66
End: 2021-01-29

## 2021-01-29 NOTE — TELEPHONE ENCOUNTER
Pt called to follow up on refills. Notified they were sent  Helped schedule establish care visit with Dr. Jamaal GRAJEDA RN

## 2021-02-03 ENCOUNTER — MYC MEDICAL ADVICE (OUTPATIENT)
Dept: FAMILY MEDICINE | Facility: CLINIC | Age: 66
End: 2021-02-03

## 2021-02-03 ENCOUNTER — VIRTUAL VISIT (OUTPATIENT)
Dept: FAMILY MEDICINE | Facility: CLINIC | Age: 66
End: 2021-02-03
Payer: COMMERCIAL

## 2021-02-03 DIAGNOSIS — E11.9 TYPE 2 DIABETES MELLITUS WITHOUT COMPLICATION, WITHOUT LONG-TERM CURRENT USE OF INSULIN (H): ICD-10-CM

## 2021-02-03 PROCEDURE — 99213 OFFICE O/P EST LOW 20 MIN: CPT | Mod: 95 | Performed by: INTERNAL MEDICINE

## 2021-02-03 NOTE — PROGRESS NOTES
"Rommel is a 66 year old who is being evaluated via a billable video visit.      How would you like to obtain your AVS? MyChart  If the video visit is dropped, the invitation should be resent by: Text to cell phone: [phone]  Will anyone else be joining your video visit? No    Video Start Time: 1:03 PM  Assessment & Plan     Type 2 diabetes mellitus without complication, without long-term current use of insulin (H)  Worsening control related to lack of exercise.  Patient is motivated to restart his usual exercise.  I would like him to increase his Glucophage to 500 mg twice daily.  He will return to clinic for his hemoglobin A1c and other labs.  He would like to wait till after he obtains his COVID-19 vaccination.  - metFORMIN (GLUCOPHAGE) 500 MG tablet; Take 1 tablet (500 mg) by mouth 2 times daily (with meals)  - Comprehensive metabolic panel (BMP + Alb, Alk Phos, ALT, AST, Total. Bili, TP); Future  - CBC with platelets and differential; Future  - Albumin Random Urine Quantitative with Creat Ratio; Future  - Lipid panel reflex to direct LDL Fasting; Future  - Hemoglobin A1c; Future    Review of external notes as documented above        BMI:   Estimated body mass index is 40.61 kg/m  as calculated from the following:    Height as of 11/20/20: 1.753 m (5' 9\").    Weight as of 11/20/20: 124.7 kg (275 lb).   Patient is motivated to do exercise plan into his daily regime.  His weight has increased as has his blood sugar.      See Patient Instructions    Return in about 3 months (around 5/3/2021).    Enio Hinton MD  [facility]    Elvi Carney is a 66 year old who presents to clinic today for the following health issues     HPI patient presents to establish primary care.  Mentions that his blood sugars have increased since the pandemic.  He is rarely leaving the house and his exercise has diminished significantly.  He has just really instituted an exercise program.  He has uncovered his " exercise bike and is motivated to start to work on exercise and diet.    Recent hemoglobin A1c was 7.0    She denies any significant symptoms currently.his   Eventually need his medications refilled.    New Patient/Transfer of Care  New Patient/Transfer of Care    Review of Systems   Constitutional, HEENT, cardiovascular, pulmonary, gi and gu systems are negative, except as otherwise noted.      Objective       Alert patient no apparent distress    Vitals:  No vitals were obtained today due to virtual visit.    Physical Exam   GENERAL: Healthy, alert and no distress  EYES: Eyes grossly normal to inspection.  No discharge or erythema, or obvious scleral/conjunctival abnormalities.  RESP: No audible wheeze, cough, or visible cyanosis.  No visible retractions or increased work of breathing.    SKIN: Visible skin clear. No significant rash, abnormal pigmentation or lesions.  NEURO: Cranial nerves grossly intact.  Mentation and speech appropriate for age.  PSYCH: Mentation appears normal, affect normal/bright, judgement and insight intact, normal speech and appearance well-groomed.    Orders Only on 11/11/2020   Component Date Value Ref Range Status     COVID-19 Virus PCR to [facility] So* 11/11/2020 Nasopharyngeal   Final     COVID-19 Virus PCR to [facility] - Re* 11/11/2020 Not Detected   Final    Comment: Collection of multiple specimens from the same patient may be necessary to   detect the virus. The possibility of a false negative should be considered if   the patient's recent exposure or clinical presentation suggests 2019 nCOV   infection and diagnostic tests for other causes of illness are negative.   Repeat testing may be considered in this setting.  Patient sample was heat inactivated and amplified using the HDPCR SARS-CoV-2   assay ([employer] The HDPCRTM SARS-CoV-2 assay is a reverse   transcription real-time polymerase chain reaction (qRT-PCR) test intended for   the qualitative detection of nucleic  acid  from SARS-CoV-2 in human nasopharyngeal swabs, oropharyngeal swabs, anterior   nasal swabs, mid-turbinate nasal swabs as well as nasal aspirate, nasal wash,   and bronchoalveolar lavage (BAL) specimens from individuals who are suspected   of COVID-19 by their healthcare provider.  A negative result does not rule out the presence of real-time PCR inhibitors   in the sp                           ecimen or COVID-19 RNA in concentrations below the limit of detection   of the assay. The possibility of a false negative should be considered if the   patients recent exposure or clinical presentation suggests COVID-19.   Additional testing or repeat testing requires consultation with the   laboratory.  Nasopharyngeal specimen is the preferred choice for swab-based SARS CoV2   testing. When collection of a nasopharyngeal swab is not possible the   following are acceptable alternatives:  an oropharyngeal (OP) specimen collected by a healthcare professional, or a   nasal mid-turbinate (NMT) swab collected by a healthcare professional or by   onsite self-collection (using a flocked tapered swab), or an anterior nares   specimen collected by a healthcare professional or by onsite self-collection   (using a round foam swab). (Centers for Disease Control)  Testing performed by [facility] ([facility]) [address] [facility] [address]  The test performance characteristics were determined by [facility]. It has not been   cleared or approved by the FDA.  The laboratory is regulated under the Clinical Laboratory Improvement   Amendments of 1988 (CLIA-88) as qualified to perform high-complexity testing.   This test is used for clinical purposes. It should not be regarded as   investigational or for research.                   Video-Visit Details    Type of service:  Video Visit    Video End Time: 1:20 PM    Originating Location (pt.  Location): Home    Distant Location (provider location):  [facility]     Platform used for Video Visit: Booker

## 2021-03-24 ASSESSMENT — ASTHMA QUESTIONNAIRES: ACT_TOTALSCORE: 25

## 2021-04-01 DIAGNOSIS — J45.40 MODERATE PERSISTENT ASTHMA WITHOUT COMPLICATION: ICD-10-CM

## 2021-05-23 ENCOUNTER — HEALTH MAINTENANCE LETTER (OUTPATIENT)
Age: 66
End: 2021-05-23

## 2021-07-02 NOTE — TELEPHONE ENCOUNTER
Consult received. To give add 60 meq of KCL. Full consult AM.     Luis Felipe Ramirez   Transplant Nephrologist, [facility]        Failed protocol.  Faby DYSONRN BSN  [facility]  [phone]

## 2021-07-07 DIAGNOSIS — E11.9 TYPE 2 DIABETES MELLITUS WITHOUT COMPLICATION, WITHOUT LONG-TERM CURRENT USE OF INSULIN (H): ICD-10-CM

## 2021-07-08 RX ORDER — GLIPIZIDE 5 MG/1
TABLET ORAL
Qty: 90 TABLET | Refills: 0 | Status: SHIPPED | OUTPATIENT
Start: 2021-07-08 | End: 2021-10-05

## 2021-07-08 NOTE — TELEPHONE ENCOUNTER
Routing refill request to provider for review/approval because:  Labs not current:      Lab Results   Component Value Date    A1C 7.0 03/11/2020    A1C 6.1 04/16/2019    A1C 5.7 10/08/2018    A1C 7.4 02/27/2018    A1C 10.6 01/11/2018     Creatinine   Date Value Ref Range Status   03/11/2020 0.77 0.66 - 1.25 mg/dL Final       Last office vist: 2/3/21    Pended 90 day supply & 0 refills. Please Review.    Next office visit: none      Maria Luisa Borges RN  [facility]

## 2021-08-27 ENCOUNTER — OFFICE VISIT (OUTPATIENT)
Dept: URGENT CARE | Facility: URGENT CARE | Age: 66
End: 2021-08-27
Payer: COMMERCIAL

## 2021-08-27 VITALS
TEMPERATURE: 98.4 F | HEART RATE: 78 BPM | DIASTOLIC BLOOD PRESSURE: 74 MMHG | SYSTOLIC BLOOD PRESSURE: 148 MMHG | OXYGEN SATURATION: 96 %

## 2021-08-27 DIAGNOSIS — S01.01XA LACERATION OF SCALP WITHOUT FOREIGN BODY, INITIAL ENCOUNTER: Primary | ICD-10-CM

## 2021-08-27 PROCEDURE — 99207 PR NON-BILLABLE SERV PER CHARTING: CPT | Performed by: STUDENT IN AN ORGANIZED HEALTH CARE EDUCATION/TRAINING PROGRAM

## 2021-08-27 PROCEDURE — 12001 RPR S/N/AX/GEN/TRNK 2.5CM/<: CPT | Performed by: STUDENT IN AN ORGANIZED HEALTH CARE EDUCATION/TRAINING PROGRAM

## 2021-08-28 NOTE — PATIENT INSTRUCTIONS
Patient Education     Scalp Laceration, Stitches or Staples  A laceration is a cut through the skin. A scalp laceration may require stitches or staples. It may also be closed with a hair positioning technique such as braiding. There are a lot of blood vessels in the scalp. Because of this, a lot of bleeding is common with scalp cuts. You may need a tetanus shot if you are not up to date on your tetanus vaccine.   Home care  These guidelines will help you care for your laceration at home:     During the first 2 days you may carefully rinse your hair in the shower to remove blood and glass or dirt particles. After 2 days you may shower and shampoo your hair normally. Don't scrub repaired area or let water run on it for a long time.    Have someone help you clean your wound every day:  ? In the shower, wash the area with soap and water. Use a wet cotton swab to loosen and remove any blood or crust that forms.  ? After cleaning, keep the wound clean and dry. Talk with your doctor about applying antibiotic ointment to the wound. Apply a fresh bandage.    Don't put your head underwater until the stitches or staples have been removed. This means no swimming.    Your doctor may prescribe an antibiotic cream or ointment to prevent infection. Don't stop taking this medicine until you have finished the prescribed course or your doctor tells you to stop.    Your doctor may prescribe medicines for pain. If no pain medicines were prescribed, you can use over-the-counter pain medicines. Follow instructions for taking these medicines. Talk with your doctor before using these medicines if you have chronic liver or kidney disease. Also talk with your doctor if you have ever had a stomach ulcer or gastrointestinal bleeding.  Follow-up care  Follow up with your healthcare provider, or as advised. Check the wound daily for the signs of infection listed below. Stitches or staples are usually removed from the scalp in about 7 to 10 days.    Call 911  Call 911 if this occurs:     Bleeding can't be controlled by direct pressure  When to seek medical advice  Call your healthcare provider right away if any of these occur:     Signs of infection, including increasing pain in the wound, redness, swelling, or pus coming from the wound    Fever of 100.4 F (38 C) or higher, or as directed by your healthcare provider    Stitches or staples come apart or fall out before 7 days    Wound edges re-open  Gabrielle last reviewed this educational content on 8/1/2019

## 2021-08-28 NOTE — PROGRESS NOTES
Assessment & Plan     Laceration of scalp without foreign body, initial encounter  Discussed after care instructions and reasons to present to UC or ED. Pt and wife in agreement with plan.           Return in 1 week (on 9/3/2021) for staple removal.    Kaye Archer MD  [facility] URGENT CARE CHERYL Carney is a 66 year old male who presents to clinic today for the following health issues:  Chief Complaint   Patient presents with     Urgent Care     Laceration     hit head on wood  clamp tool, cut ontop of head.     HPI    Laceration    Mechanism of injury: hit head at home on wood clamp (remodeling home)  History provided by: Patient & wife  Time of injury was 1 hours(s) ago.    This is a non-work related injury.    Associated symptoms: Denies headache, vision changes, nausea or vomiting    Last tetanus booster within 10 years: Yes 2018    LACERATION EXAM:   Size of laceration: 5 centimeters  Characteristics of the laceration: dirty and linear  Depth of laceration: superficial  Tendon function intact: Yes  Sensation to light touch intact: Yes  Foreign body: No    Picture included in patient's chart: no    PROCEDURE NOTE:  Anesthesia: None  Prepped and draped in the usual sterile fashion  Wound irrigated with sterile water  Wound was explored for any foreign bodies and evaluated for tendon, nerve, vessel or joint involvement.    Closure was with staples  Laceration was closed with Staples x4  Bandage was applied  Patient tolerated the procedure well            Objective    BP (!) 148/74   Pulse 78   Temp 98.4  F (36.9  C) (Tympanic)   SpO2 96%   Physical Exam   GEN: Alert and appropriately interactive for age  EYES: Eyes grossly normal to inspection, conjunctivae and sclerae normal  RESP: Breathing comfortably on room air   CV: Warm and well perfused peripheral extremities   MS: no gross musculoskeletal defects noted, no edema  NEURO: Alert and oriented x 4. Gait normal. Speech fluent.   PERRL bilaterally.  SKIN: laceration on posterior scalp as per above  PSYCH: Affect normal/bright. Appearance well groomed.

## 2021-09-03 ENCOUNTER — OFFICE VISIT (OUTPATIENT)
Dept: URGENT CARE | Facility: URGENT CARE | Age: 66
End: 2021-09-03
Payer: COMMERCIAL

## 2021-09-03 VITALS
HEART RATE: 81 BPM | OXYGEN SATURATION: 95 % | TEMPERATURE: 97.2 F | DIASTOLIC BLOOD PRESSURE: 71 MMHG | SYSTOLIC BLOOD PRESSURE: 148 MMHG

## 2021-09-03 DIAGNOSIS — S01.01XA LACERATION OF SCALP WITHOUT FOREIGN BODY, INITIAL ENCOUNTER: Primary | ICD-10-CM

## 2021-09-03 PROCEDURE — 99207 PR NO CHARGE LOS: CPT | Performed by: STUDENT IN AN ORGANIZED HEALTH CARE EDUCATION/TRAINING PROGRAM

## 2021-09-03 NOTE — PROGRESS NOTES
Assessment & Plan     Laceration of scalp without foreign body, initial encounter  Staples removed with staple remover. Pt tolerated procedure well with no complications.           Kaye Archer MD  [facility] URGENT CARE CHERYL Carney is a 66 year old male who presents to clinic today for the following health issues:  Chief Complaint   Patient presents with     Urgent Care     Suture Removal     4 staples removed from top of head, put in on 8/27     HPI  Had staples placed on scalp for injury 1 week ago      Objective    BP (!) 148/71   Pulse 81   Temp 97.2  F (36.2  C) (Tympanic)   SpO2 95%   Physical Exam   GEN: Alert and appropriately interactive for age  EYES: Eyes grossly normal to inspection, conjunctivae and sclerae normal  RESP: Breathing comfortably on room air   CV: Warm and well perfused peripheral extremities   MS: no gross musculoskeletal defects noted, no edema  NEURO: Alert and oriented. Gait normal. Speech fluent.    PSYCH: Affect normal/bright. Appearance well groomed.   SKIN: well healed linear laceration on scalp without surrounding erythema or drainage

## 2021-09-09 DIAGNOSIS — E78.5 HYPERLIPIDEMIA LDL GOAL <130: ICD-10-CM

## 2021-09-10 NOTE — TELEPHONE ENCOUNTER
Atorvastatin 40 mg tablet    Summary: Take 1 tablet (40 mg) by mouth daily, Disp-90 tablet, R-3, E-Prescribe   Dose, Route, Frequency: 40 mg, Oral, DAILY  Start: 1/11/2021  Ord/Sold: 1/11/2021     LOV 2/3/21

## 2021-09-12 ENCOUNTER — HEALTH MAINTENANCE LETTER (OUTPATIENT)
Age: 66
End: 2021-09-12

## 2021-09-13 RX ORDER — ATORVASTATIN CALCIUM 40 MG/1
40 TABLET, FILM COATED ORAL DAILY
Qty: 90 TABLET | Refills: 0 | Status: SHIPPED | OUTPATIENT
Start: 2021-09-13 | End: 2022-02-10

## 2021-09-13 NOTE — TELEPHONE ENCOUNTER
Routing refill request to provider for review/approval because:  LDL out of date, but has appt scheduled next month   Josseline Cotto RN

## 2021-09-16 DIAGNOSIS — J45.40 MODERATE PERSISTENT ASTHMA WITHOUT COMPLICATION: ICD-10-CM

## 2021-09-20 NOTE — TELEPHONE ENCOUNTER
Medication filled 1 time as pt is due for a follow-up in clinic. further refills at next visit already scheduled    Josseline Cotto RN  [facility] RN Triage Team

## 2021-09-23 DIAGNOSIS — E11.9 TYPE 2 DIABETES MELLITUS WITHOUT COMPLICATION, WITHOUT LONG-TERM CURRENT USE OF INSULIN (H): ICD-10-CM

## 2021-09-24 NOTE — TELEPHONE ENCOUNTER
Refusing script, patient should have refills on file. Mediation script last sent on 2/3/2021  for 90 day supply and 3  refills .     Maria Luisa Borges RN  [facility].

## 2021-09-30 DIAGNOSIS — E11.9 TYPE 2 DIABETES MELLITUS WITHOUT COMPLICATION, WITHOUT LONG-TERM CURRENT USE OF INSULIN (H): ICD-10-CM

## 2021-10-04 NOTE — TELEPHONE ENCOUNTER
Lab Results   Component Value Date    A1C 7.0 03/11/2020    A1C 6.1 04/16/2019    A1C 5.7 10/08/2018    A1C 7.4 02/27/2018    A1C 10.6 01/11/2018     Creatinine   Date Value Ref Range Status   03/11/2020 0.77 0.66 - 1.25 mg/dL Final     Please refill as appropriate.  Thank you,    Aleyda Sandy RN  [facility]

## 2021-10-05 RX ORDER — GLIPIZIDE 5 MG/1
TABLET ORAL
Qty: 90 TABLET | Refills: 3 | Status: SHIPPED | OUTPATIENT
Start: 2021-10-05 | End: 2022-09-07

## 2021-10-09 DIAGNOSIS — E11.9 TYPE 2 DIABETES MELLITUS WITHOUT COMPLICATION, WITHOUT LONG-TERM CURRENT USE OF INSULIN (H): ICD-10-CM

## 2021-10-11 NOTE — TELEPHONE ENCOUNTER
Next 5 appointments (look out 90 days)    Oct 12, 2021  1:30 PM  Office Visit with Enio Hinton MD  [facility] ([facility] ) [address]  [facility] [address]  [phone]   Oct 26, 2021  3:30 PM  (Arrive by 3:15 PM)  Return Visit with Denice Chong PA-C  [facility] ([facility] ) [address] [place] [id number] [phone]        Routing refill request to provider for review/approval because:  Labs not current:  A1C, creatinine   Pt did not do lab only visit but has upcoming OV tomorrow     Iraida ROBERTS RN

## 2021-10-12 ENCOUNTER — OFFICE VISIT (OUTPATIENT)
Dept: FAMILY MEDICINE | Facility: CLINIC | Age: 66
End: 2021-10-12
Payer: COMMERCIAL

## 2021-10-12 VITALS
DIASTOLIC BLOOD PRESSURE: 78 MMHG | TEMPERATURE: 97.5 F | SYSTOLIC BLOOD PRESSURE: 134 MMHG | WEIGHT: 274 LBS | HEART RATE: 68 BPM | BODY MASS INDEX: 40.58 KG/M2 | RESPIRATION RATE: 18 BRPM | HEIGHT: 69 IN | OXYGEN SATURATION: 97 %

## 2021-10-12 DIAGNOSIS — G47.33 OSA (OBSTRUCTIVE SLEEP APNEA): ICD-10-CM

## 2021-10-12 DIAGNOSIS — E11.9 TYPE 2 DIABETES MELLITUS WITHOUT COMPLICATION, WITHOUT LONG-TERM CURRENT USE OF INSULIN (H): ICD-10-CM

## 2021-10-12 DIAGNOSIS — Z23 NEED FOR PROPHYLACTIC VACCINATION AND INOCULATION AGAINST INFLUENZA: Primary | ICD-10-CM

## 2021-10-12 LAB
BASOPHILS # BLD AUTO: 0 10E3/UL (ref 0–0.2)
BASOPHILS NFR BLD AUTO: 0 %
EOSINOPHIL # BLD AUTO: 0.4 10E3/UL (ref 0–0.7)
EOSINOPHIL NFR BLD AUTO: 5 %
ERYTHROCYTE [DISTWIDTH] IN BLOOD BY AUTOMATED COUNT: 13.6 % (ref 10–15)
HBA1C MFR BLD: 6.6 % (ref 0–5.6)
HCT VFR BLD AUTO: 43.3 % (ref 40–53)
HGB BLD-MCNC: 14.5 G/DL (ref 13.3–17.7)
LYMPHOCYTES # BLD AUTO: 3.5 10E3/UL (ref 0.8–5.3)
LYMPHOCYTES NFR BLD AUTO: 37 %
MCH RBC QN AUTO: 28.6 PG (ref 26.5–33)
MCHC RBC AUTO-ENTMCNC: 33.5 G/DL (ref 31.5–36.5)
MCV RBC AUTO: 85 FL (ref 78–100)
MONOCYTES # BLD AUTO: 0.9 10E3/UL (ref 0–1.3)
MONOCYTES NFR BLD AUTO: 9 %
NEUTROPHILS # BLD AUTO: 4.6 10E3/UL (ref 1.6–8.3)
NEUTROPHILS NFR BLD AUTO: 49 %
PLATELET # BLD AUTO: 203 10E3/UL (ref 150–450)
RBC # BLD AUTO: 5.07 10E6/UL (ref 4.4–5.9)
WBC # BLD AUTO: 9.5 10E3/UL (ref 4–11)

## 2021-10-12 PROCEDURE — 36415 COLL VENOUS BLD VENIPUNCTURE: CPT | Performed by: INTERNAL MEDICINE

## 2021-10-12 PROCEDURE — G0008 ADMIN INFLUENZA VIRUS VAC: HCPCS | Performed by: INTERNAL MEDICINE

## 2021-10-12 PROCEDURE — 90662 IIV NO PRSV INCREASED AG IM: CPT | Performed by: INTERNAL MEDICINE

## 2021-10-12 PROCEDURE — 99214 OFFICE O/P EST MOD 30 MIN: CPT | Mod: 25 | Performed by: INTERNAL MEDICINE

## 2021-10-12 PROCEDURE — 80050 GENERAL HEALTH PANEL: CPT | Performed by: INTERNAL MEDICINE

## 2021-10-12 PROCEDURE — 83036 HEMOGLOBIN GLYCOSYLATED A1C: CPT | Performed by: INTERNAL MEDICINE

## 2021-10-12 PROCEDURE — G0103 PSA SCREENING: HCPCS | Performed by: INTERNAL MEDICINE

## 2021-10-12 PROCEDURE — 82043 UR ALBUMIN QUANTITATIVE: CPT | Performed by: INTERNAL MEDICINE

## 2021-10-12 PROCEDURE — 80061 LIPID PANEL: CPT | Performed by: INTERNAL MEDICINE

## 2021-10-12 ASSESSMENT — MIFFLIN-ST. JEOR: SCORE: 2013.24

## 2021-10-12 NOTE — PROGRESS NOTES
"    Assessment & Plan     Need for prophylactic vaccination and inoculation against influenza    - ADMIN INFLUENZA (For MEDICARE Patients ONLY) []  - Hemoglobin A1c; Future  - Comprehensive metabolic panel (BMP + Alb, Alk Phos, ALT, AST, Total. Bili, TP); Future  - CBC with platelets and differential; Future  - TSH with free T4 reflex; Future  - Lipid panel reflex to direct LDL Fasting; Future  - Albumin Random Urine Quantitative with Creat Ratio; Future    Type 2 diabetes mellitus without complication, without long-term current use of insulin (H)  We will check hemoglobin A1c today as well as microalbumin.  BMI is 40.46.  He has been using his CPAP apparatus.  Claims is still working effectively.  - PSA, screen; Future  - Hemoglobin A1c; Future  - Comprehensive metabolic panel (BMP + Alb, Alk Phos, ALT, AST, Total. Bili, TP); Future  - CBC with platelets and differential; Future  - TSH with free T4 reflex; Future  - Lipid panel reflex to direct LDL Fasting; Future  - Albumin Random Urine Quantitative with Creat Ratio; Future    LYRIC (obstructive sleep apnea)  Continues to utilize his CPAP apparatus.  His CPAP device is 12 years old.  He states that the functioning is still good.  He feels good utilizing it.         BMI:   Estimated body mass index is 40.46 kg/m  as calculated from the following:    Height as of this encounter: 1.753 m (5' 9\").    Weight as of this encounter: 124.3 kg (274 lb).   Continues to work on diet and exercise current weight 274 pounds    See Patient Instructions    Return in about 6 months (around 4/12/2022) for Routine preventive.    Enio Hinton MD  [facility] CHERYL Carney is a 66 year old who presents for the following health issues     HPI 66-year-old with a history of type 2 diabetes.  Due to Covid and the pandemic he has not been seen for some time.  Overall feels relatively well.    Has had some lesions on his back which he would like to have " "checked out.  He has an appointment with dermatology in the near future to rule out actinic keratoses of the scalp.    Denies endocrine hematologic or allergic symptoms otherwise.  Does have sleep apnea and is using his CPAP apparatus nightly.  Compliant with his medications additionally.    Has a history of toe fracture no numbness of the lower extremities is noted.    Diabetes Follow Up          Review of Systems   Constitutional, HEENT, cardiovascular, pulmonary, gi and gu systems are negative, except as otherwise noted.      Objective    /78 (BP Location: Right arm, Cuff Size: Adult Large)   Pulse 68   Temp 97.5  F (36.4  C) (Temporal)   Resp 18   Ht 1.753 m (5' 9\")   Wt 124.3 kg (274 lb)   SpO2 97%   BMI 40.46 kg/m    Body mass index is 40.46 kg/m .  Physical Exam   GENERAL: healthy, alert and no distress  EYES: Eyes grossly normal to inspection, PERRL and conjunctivae and sclerae normal  HENT: ear canals and TM's normal, nose and mouth without ulcers or lesions  NECK: no adenopathy, no asymmetry, masses, or scars and thyroid normal to palpation  RESP: lungs clear to auscultation - no rales, rhonchi or wheezes  CV: regular rate and rhythm, normal S1 S2, no S3 or S4, no murmur, click or rub, no peripheral edema and peripheral pulses strong  ABDOMEN: soft, nontender, no hepatosplenomegaly, no masses and bowel sounds normal  MS: no gross musculoskeletal defects noted, no edema  SKIN: no suspicious lesions or rashes  NEURO: Normal strength and tone, mentation intact and speech normal  PSYCH: mentation appears normal, affect normal/bright    Orders Only on 11/11/2020   Component Date Value Ref Range Status     COVID-19 Virus PCR to [facility] So* 11/11/2020 Nasopharyngeal   Final     COVID-19 Virus PCR to [facility] Re* 11/11/2020 Not Detected   Final    Comment: Collection of multiple specimens from the same patient may be necessary to   detect the virus. The possibility of a false negative should be " considered if   the patient's recent exposure or clinical presentation suggests 2019 nCOV   infection and diagnostic tests for other causes of illness are negative.   Repeat testing may be considered in this setting.  Patient sample was heat inactivated and amplified using the HDPCR SARS-CoV-2   assay ([employer] The HDPCRTM SARS-CoV-2 assay is a reverse   transcription real-time polymerase chain reaction (qRT-PCR) test intended for   the qualitative detection of nucleic acid  from SARS-CoV-2 in human nasopharyngeal swabs, oropharyngeal swabs, anterior   nasal swabs, mid-turbinate nasal swabs as well as nasal aspirate, nasal wash,   and bronchoalveolar lavage (BAL) specimens from individuals who are suspected   of COVID-19 by their healthcare provider.  A negative result does not rule out the presence of real-time PCR inhibitors   in the sp                           ecimen or COVID-19 RNA in concentrations below the limit of detection   of the assay. The possibility of a false negative should be considered if the   patients recent exposure or clinical presentation suggests COVID-19.   Additional testing or repeat testing requires consultation with the   laboratory.  Nasopharyngeal specimen is the preferred choice for swab-based SARS CoV2   testing. When collection of a nasopharyngeal swab is not possible the   following are acceptable alternatives:  an oropharyngeal (OP) specimen collected by a healthcare professional, or a   nasal mid-turbinate (NMT) swab collected by a healthcare professional or by   onsite self-collection (using a flocked tapered swab), or an anterior nares   specimen collected by a healthcare professional or by onsite self-collection   (using a round foam swab). (Centers for Disease Control)  Testing performed by [facility]   [facility] ([facility]) [address] [place] [address]  The test performance  characteristics were determined by [facility]. It has not been   cleared or approved by the FDA.  The laboratory is regulated under the Clinical Laboratory Improvement   Amendments of 1988 (CLIA-88) as qualified to perform high-complexity testing.   This test is used for clinical purposes. It should not be regarded as   investigational or for research.

## 2021-10-12 NOTE — PATIENT INSTRUCTIONS
Rommel;  You appear to be doing well.  I had like to assess your blood glucose control.  We will check the labs today    Also check cholesterol today.    The lesions on your back are seborrheic keratoses.  These are benign lesions which can occasionally itch.  They are not premalignant in any way    Your foot exam was completely normal as was your prostate exam.  Nevertheless a PSA will be drawn today.    You are up-to-date with colorectal cancer screening.    I would like to see you again in 6 months to follow-up on diabetes.  Please make certain to have your eyes checked at least annually.    Best regards  Enio

## 2021-10-13 LAB
ALBUMIN SERPL-MCNC: 3.6 G/DL (ref 3.4–5)
ALP SERPL-CCNC: 78 U/L (ref 40–150)
ALT SERPL W P-5'-P-CCNC: 34 U/L (ref 0–70)
ANION GAP SERPL CALCULATED.3IONS-SCNC: 7 MMOL/L (ref 3–14)
AST SERPL W P-5'-P-CCNC: 16 U/L (ref 0–45)
BILIRUB SERPL-MCNC: 0.5 MG/DL (ref 0.2–1.3)
BUN SERPL-MCNC: 15 MG/DL (ref 7–30)
CALCIUM SERPL-MCNC: 9 MG/DL (ref 8.5–10.1)
CHLORIDE BLD-SCNC: 106 MMOL/L (ref 94–109)
CHOLEST SERPL-MCNC: 142 MG/DL
CO2 SERPL-SCNC: 26 MMOL/L (ref 20–32)
CREAT SERPL-MCNC: 0.72 MG/DL (ref 0.66–1.25)
CREAT UR-MCNC: 110 MG/DL
FASTING STATUS PATIENT QL REPORTED: NO
GFR SERPL CREATININE-BSD FRML MDRD: >90 ML/MIN/1.73M2
GLUCOSE BLD-MCNC: 83 MG/DL (ref 70–99)
HDLC SERPL-MCNC: 33 MG/DL
LDLC SERPL CALC-MCNC: 65 MG/DL
MICROALBUMIN UR-MCNC: 10 MG/L
MICROALBUMIN/CREAT UR: 9.09 MG/G CR (ref 0–17)
NONHDLC SERPL-MCNC: 109 MG/DL
POTASSIUM BLD-SCNC: 4.4 MMOL/L (ref 3.4–5.3)
PROT SERPL-MCNC: 7.5 G/DL (ref 6.8–8.8)
PSA SERPL-MCNC: 0.44 UG/L (ref 0–4)
SODIUM SERPL-SCNC: 139 MMOL/L (ref 133–144)
TRIGL SERPL-MCNC: 219 MG/DL
TSH SERPL DL<=0.005 MIU/L-ACNC: 1.71 MU/L (ref 0.4–4)

## 2021-10-26 ENCOUNTER — OFFICE VISIT (OUTPATIENT)
Dept: FAMILY MEDICINE | Facility: CLINIC | Age: 66
End: 2021-10-26
Payer: COMMERCIAL

## 2021-10-26 VITALS — SYSTOLIC BLOOD PRESSURE: 120 MMHG | DIASTOLIC BLOOD PRESSURE: 62 MMHG

## 2021-10-26 DIAGNOSIS — D22.9 MULTIPLE PIGMENTED NEVI: ICD-10-CM

## 2021-10-26 DIAGNOSIS — D18.01 CHERRY ANGIOMA: ICD-10-CM

## 2021-10-26 DIAGNOSIS — Z85.828 HISTORY OF SQUAMOUS CELL CARCINOMA OF SKIN: ICD-10-CM

## 2021-10-26 DIAGNOSIS — L81.4 SOLAR LENTIGINOSIS: ICD-10-CM

## 2021-10-26 DIAGNOSIS — Z87.2 HISTORY OF ACTINIC KERATOSES: ICD-10-CM

## 2021-10-26 DIAGNOSIS — Z12.83 SKIN CANCER SCREENING: Primary | ICD-10-CM

## 2021-10-26 DIAGNOSIS — L82.1 SEBORRHEIC KERATOSES: ICD-10-CM

## 2021-10-26 PROCEDURE — 99213 OFFICE O/P EST LOW 20 MIN: CPT | Performed by: PHYSICIAN ASSISTANT

## 2021-10-26 NOTE — PROGRESS NOTES
[facility] Dermatology Note  Encounter Date: Oct 26, 2021  Office Visit     Dermatology Problem List:  #. Skin cancer screening 10/26/2021   #. 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 AKs s/p cryo  ____________________________________________    Assessment & Plan:    #. History of NMSC and actinic keratoses. No evidence of recurrent disease.  - Continue photoprotection - recommend SPF 30 or higher with frequent reapplication  - Continue yearly skin exams  - Advised to monitor for changing, non-healing, bleeding, painful, changing, or otherwise symptomatic lesions     #. Benign lesions: Multiple benign nevi, solar lentigos, seborrheic keratoses, cherry angiomas. Explained to patient benign nature of lesion. No treatment is necessary at this time unless the lesion changes or becomes symptomatic.   - ABCDs of melanoma were discussed and self skin checks were advised.  - Sun precaution was advised including the use of sun screens of SPF 30 or higher, sun protective clothing, and avoidance of tanning beds.     Procedures Performed:   None    Follow-up: 1 year(s), or earlier for new or changing lesions    Staff and Scribe:     Provider Disclosure:   The documentation recorded by the scribe accurately reflects the services I personally performed and the decisions made by me.    All risks, benefits and alternatives were discussed with patient.  Patient is in agreement and understands the assessment and plan.  All questions were answered.  Sun Screen Education was given.   Return to Clinic annually or sooner as needed.   Denice Chong PA-C   [facility]     Scribe Disclosure:  I, Juliana Schwartz, am serving as a scribe to document services personally performed by Denice Chong PA-C based on data collection and the provider's statements to me.   ____________________________________________    CC: Skin Check      HPI:  Mr. Rommel Melvin Irvin is a(n) 66  year old male who presents today as a return patient for FBSE. The patient was last seen by Dr. Anna on 7/22/2020, at which time he had a benign skin cancer screening of the head and neck only.    Today, the patient denies any new skin concerns. He states that he has some spots on his back that are growing, though notes that his primary care provider told him that these are benign.    He reports that his brother had a growth removed from his nose, though he is unsure if this was cancerous. He otherwise denies family history of skin cancer or skin disease.    Patient is otherwise feeling well, without additional skin concerns.    Labs Reviewed:  N/A    Physical Exam:  Vitals: /62   SKIN: Full skin, which includes the head/face, both arms, chest, back, abdomen,both legs, genitalia and/or groin buttocks, digits and/or nails, was examined.  - There are dome shaped bright red papules on the trunk and extremities.   - Multiple regular brown pigmented macules and papules are identified on the trunk and extremities.   - Scattered brown macules on sun exposed areas.  - There are waxy stuck on tan to brown papules on the trunk and extremities.   - No other lesions of concern on areas examined.     Medications:  Current Outpatient Medications   Medication     albuterol (PROAIR HFA, PROVENTIL HFA, VENTOLIN HFA) 108 (90 BASE) MCG/ACT inhaler     aspirin 81 MG tablet     atorvastatin (LIPITOR) 40 MG tablet     blood glucose (NO BRAND SPECIFIED) test strip     blood glucose monitoring (NO BRAND SPECIFIED) meter device kit     FLOVENT DISKUS 250 MCG/BLIST inhaler     glipiZIDE (GLUCOTROL) 5 MG tablet     lisinopril (ZESTRIL) 5 MG tablet     metFORMIN (GLUCOPHAGE) 500 MG tablet     order for DME     order for DME     No current facility-administered medications for this visit.      Past Medical History:   Patient Active Problem List   Diagnosis     LYRIC (obstructive sleep apnea)     Advanced directives,  counseling/discussion     Hyperlipidemia LDL goal <130     Family history of coronary artery disease     Obesity, Class III, BMI 40-49.9 (morbid obesity) (H)     Transaminasemia     Moderate persistent asthma without complication     Hyperglycemia     Type 2 diabetes mellitus without complication, without long-term current use of insulin (H)     History of squamous cell carcinoma of skin     Past Medical History:   Diagnosis Date     Asthma      Family history of coronary artery disease 1/4/2013     HTN (hypertension)      Hypercholesterolemia      Hyperlipidemia LDL goal <130 1/4/2013     Moderate persistent asthma 1/4/2013     Obesity, Class III, BMI 40-49.9 (morbid obesity) (H) 1/4/2013     LYRIC (obstructive sleep apnea)      Squamous cell carcinoma      Transaminasemia 1/4/2013     CC Referred Self, MD  No address on file on close of this encounter.

## 2021-10-26 NOTE — LETTER
10/26/2021         RE: Rommel Bryan  [address] [address]        Dear Colleague,    Thank you for referring your patient, Rommel Bryan, to the [facility]. Please see a copy of my visit note below.    [facility] Dermatology Note  Encounter Date: Oct 26, 2021  Office Visit     Dermatology Problem List:  #. Skin cancer screening 10/26/2021   #. 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 AKs s/p cryo  ____________________________________________    Assessment & Plan:    #. History of NMSC and actinic keratoses. No evidence of recurrent disease.  - Continue photoprotection - recommend SPF 30 or higher with frequent reapplication  - Continue yearly skin exams  - Advised to monitor for changing, non-healing, bleeding, painful, changing, or otherwise symptomatic lesions     #. Benign lesions: Multiple benign nevi, solar lentigos, seborrheic keratoses, cherry angiomas. Explained to patient benign nature of lesion. No treatment is necessary at this time unless the lesion changes or becomes symptomatic.   - ABCDs of melanoma were discussed and self skin checks were advised.  - Sun precaution was advised including the use of sun screens of SPF 30 or higher, sun protective clothing, and avoidance of tanning beds.     Procedures Performed:   None    Follow-up: 1 year(s), or earlier for new or changing lesions    Staff and Scribe:     Provider Disclosure:   The documentation recorded by the scribe accurately reflects the services I personally performed and the decisions made by me.    All risks, benefits and alternatives were discussed with patient.  Patient is in agreement and understands the assessment and plan.  All questions were answered.  Sun Screen Education was given.   Return to Clinic annually or sooner as needed.   Denice Chong PA-C   [facility]     Scribe Disclosure:  Juliana DOUGLAS  Efren, am serving as a scribe to document services personally performed by Denice hCong PA-C based on data collection and the provider's statements to me.   ____________________________________________    CC: Skin Check      HPI:  Mr. Rommel Bryan is a(n) 66 year old male who presents today as a return patient for FBSE. The patient was last seen by Dr. Anna on 7/22/2020, at which time he had a benign skin cancer screening of the head and neck only.    Today, the patient denies any new skin concerns. He states that he has some spots on his back that are growing, though notes that his primary care provider told him that these are benign.    He reports that his brother had a growth removed from his nose, though he is unsure if this was cancerous. He otherwise denies family history of skin cancer or skin disease.    Patient is otherwise feeling well, without additional skin concerns.    Labs Reviewed:  N/A    Physical Exam:  Vitals: /62   SKIN: Full skin, which includes the head/face, both arms, chest, back, abdomen,both legs, genitalia and/or groin buttocks, digits and/or nails, was examined.  - There are dome shaped bright red papules on the trunk and extremities.   - Multiple regular brown pigmented macules and papules are identified on the trunk and extremities.   - Scattered brown macules on sun exposed areas.  - There are waxy stuck on tan to brown papules on the trunk and extremities.   - No other lesions of concern on areas examined.     Medications:  Current Outpatient Medications   Medication     albuterol (PROAIR HFA, PROVENTIL HFA, VENTOLIN HFA) 108 (90 BASE) MCG/ACT inhaler     aspirin 81 MG tablet     atorvastatin (LIPITOR) 40 MG tablet     blood glucose (NO BRAND SPECIFIED) test strip     blood glucose monitoring (NO BRAND SPECIFIED) meter device kit     FLOVENT DISKUS 250 MCG/BLIST inhaler     glipiZIDE (GLUCOTROL) 5 MG tablet     lisinopril (ZESTRIL) 5 MG tablet      metFORMIN (GLUCOPHAGE) 500 MG tablet     order for DME     order for DME     No current facility-administered medications for this visit.      Past Medical History:   Patient Active Problem List   Diagnosis     LYRIC (obstructive sleep apnea)     Advanced directives, counseling/discussion     Hyperlipidemia LDL goal <130     Family history of coronary artery disease     Obesity, Class III, BMI 40-49.9 (morbid obesity) (H)     Transaminasemia     Moderate persistent asthma without complication     Hyperglycemia     Type 2 diabetes mellitus without complication, without long-term current use of insulin (H)     History of squamous cell carcinoma of skin     Past Medical History:   Diagnosis Date     Asthma      Family history of coronary artery disease 1/4/2013     HTN (hypertension)      Hypercholesterolemia      Hyperlipidemia LDL goal <130 1/4/2013     Moderate persistent asthma 1/4/2013     Obesity, Class III, BMI 40-49.9 (morbid obesity) (H) 1/4/2013     LYRIC (obstructive sleep apnea)      Squamous cell carcinoma      Transaminasemia 1/4/2013     CC Referred Self, MD  No address on file on close of this encounter.      Again, thank you for allowing me to participate in the care of your patient.        Sincerely,        Denice Chong PA-C

## 2021-12-10 DIAGNOSIS — J45.40 MODERATE PERSISTENT ASTHMA WITHOUT COMPLICATION: ICD-10-CM

## 2021-12-13 NOTE — TELEPHONE ENCOUNTER
ACT sent via [email or web address]. Yanni refill given as this is just out of date not out of range.   Tootie HENDRIX RN  [facility]

## 2021-12-13 NOTE — TELEPHONE ENCOUNTER
Medication is being filled for 1 time refill only due to: Patient ACT over due, sent via [employer].   Tootie HENDRIX RN  [facility]

## 2022-01-02 ENCOUNTER — HEALTH MAINTENANCE LETTER (OUTPATIENT)
Age: 67
End: 2022-01-02

## 2022-02-09 DIAGNOSIS — E78.5 HYPERLIPIDEMIA LDL GOAL <130: ICD-10-CM

## 2022-02-10 RX ORDER — ATORVASTATIN CALCIUM 40 MG/1
TABLET, FILM COATED ORAL
Qty: 90 TABLET | Refills: 3 | Status: SHIPPED | OUTPATIENT
Start: 2022-02-10 | End: 2023-01-06

## 2022-02-10 NOTE — TELEPHONE ENCOUNTER
Prescription approved per [facility] Refill Protocol.    Luz Maria ALONSO RN  EP Triage

## 2022-02-28 DIAGNOSIS — J45.40 MODERATE PERSISTENT ASTHMA WITHOUT COMPLICATION: ICD-10-CM

## 2022-03-01 NOTE — TELEPHONE ENCOUNTER
Last visit 10/12/21         Routing refill request to provider for review/approval because:  ACT not current, sent to pt to complete via [employer]     Iraida ROBERTS Triage RN  [facility]

## 2022-04-12 ENCOUNTER — OFFICE VISIT (OUTPATIENT)
Dept: FAMILY MEDICINE | Facility: CLINIC | Age: 67
End: 2022-04-12
Payer: COMMERCIAL

## 2022-04-12 VITALS
HEART RATE: 73 BPM | WEIGHT: 285 LBS | HEIGHT: 69 IN | OXYGEN SATURATION: 94 % | BODY MASS INDEX: 42.21 KG/M2 | DIASTOLIC BLOOD PRESSURE: 79 MMHG | SYSTOLIC BLOOD PRESSURE: 142 MMHG | TEMPERATURE: 97.3 F | RESPIRATION RATE: 18 BRPM

## 2022-04-12 DIAGNOSIS — Z82.49 FAMILY HISTORY OF CORONARY ARTERY DISEASE: Primary | ICD-10-CM

## 2022-04-12 DIAGNOSIS — J45.40 MODERATE PERSISTENT ASTHMA WITHOUT COMPLICATION: ICD-10-CM

## 2022-04-12 DIAGNOSIS — E11.9 TYPE 2 DIABETES MELLITUS WITHOUT COMPLICATION, WITHOUT LONG-TERM CURRENT USE OF INSULIN (H): ICD-10-CM

## 2022-04-12 LAB
ALBUMIN SERPL-MCNC: 3.8 G/DL (ref 3.4–5)
ALP SERPL-CCNC: 83 U/L (ref 40–150)
ALT SERPL W P-5'-P-CCNC: 42 U/L (ref 0–70)
ANION GAP SERPL CALCULATED.3IONS-SCNC: 4 MMOL/L (ref 3–14)
AST SERPL W P-5'-P-CCNC: 18 U/L (ref 0–45)
BILIRUB SERPL-MCNC: 0.3 MG/DL (ref 0.2–1.3)
BUN SERPL-MCNC: 19 MG/DL (ref 7–30)
CALCIUM SERPL-MCNC: 9.4 MG/DL (ref 8.5–10.1)
CHLORIDE BLD-SCNC: 107 MMOL/L (ref 94–109)
CO2 SERPL-SCNC: 28 MMOL/L (ref 20–32)
CREAT SERPL-MCNC: 0.68 MG/DL (ref 0.66–1.25)
GFR SERPL CREATININE-BSD FRML MDRD: >90 ML/MIN/1.73M2
GLUCOSE BLD-MCNC: 151 MG/DL (ref 70–99)
HBA1C MFR BLD: 7.8 % (ref 0–5.6)
POTASSIUM BLD-SCNC: 4.2 MMOL/L (ref 3.4–5.3)
PROT SERPL-MCNC: 7.6 G/DL (ref 6.8–8.8)
SODIUM SERPL-SCNC: 139 MMOL/L (ref 133–144)

## 2022-04-12 PROCEDURE — 99214 OFFICE O/P EST MOD 30 MIN: CPT | Performed by: INTERNAL MEDICINE

## 2022-04-12 PROCEDURE — 80053 COMPREHEN METABOLIC PANEL: CPT | Performed by: INTERNAL MEDICINE

## 2022-04-12 PROCEDURE — 36415 COLL VENOUS BLD VENIPUNCTURE: CPT | Performed by: INTERNAL MEDICINE

## 2022-04-12 PROCEDURE — 83036 HEMOGLOBIN GLYCOSYLATED A1C: CPT | Performed by: INTERNAL MEDICINE

## 2022-04-12 ASSESSMENT — PAIN SCALES - GENERAL: PAINLEVEL: NO PAIN (0)

## 2022-04-12 NOTE — PROGRESS NOTES
"  Assessment & Plan     Type 2 diabetes mellitus without complication, without long-term current use of insulin (H)  We will evaluate hemoglobin A1c today.  Patient is cognizant that he needs to improve his diet.  His weight has increased and his diet has been more liberal this winter.  Significant stressors.    - Hemoglobin A1c; Future  - Comprehensive metabolic panel (BMP + Alb, Alk Phos, ALT, AST, Total. Bili, TP); Future    Family history of coronary artery disease  Continues to utilize his CPAP.  O2 saturations 94%.  Most recent LDL much less than 70.  Blood pressure today is slightly elevated.  We will monitor this and contact me should he continue to have elevation.  We will increase his lisinopril from 5 to 10 mg should this be the case.    Moderate persistent asthma without complication  Stable.  No wheezing.  Does have sleep apnea and is compliant with his CPAP               BMI:   Estimated body mass index is 42.09 kg/m  as calculated from the following:    Height as of this encounter: 1.753 m (5' 9\").    Weight as of this encounter: 129.3 kg (285 lb).       See Patient Instructions    Return in about 6 months (around 10/12/2022) for Routine preventive.    Enio Hinton MD  [facility] CHERYL Carney is a 67 year old who presents for the following health issues     HPI 67-year-old male who presents clinic today for follow-up primarily on diabetes.  He states he has not been checking his blood sugars.  He mentions his weight is up due to some dietary issues.  Currently has a lot of stressors.  His wife is being treated for lymphoma.  His mother-in-law is having significant health issues in [place].    Patient states he has been eating more than normal.  His weights increased.  His blood pressure is up as we discussed today in clinic.    Denies chest pain or shortness of breath.  No visual change.  No change in sensorium          Review of Systems   Constitutional, HEENT, " "cardiovascular, pulmonary, gi and gu systems are negative, except as otherwise noted.      Objective    BP (!) 142/79 (BP Location: Right arm, Patient Position: Sitting, Cuff Size: Adult Large)   Pulse 73   Temp 97.3  F (36.3  C) (Temporal)   Resp 18   Ht 1.753 m (5' 9\")   Wt 129.3 kg (285 lb)   SpO2 94%   BMI 42.09 kg/m    Body mass index is 42.09 kg/m .  Physical Exam   GENERAL: healthy, alert and no distress  EYES: Eyes grossly normal to inspection, PERRL and conjunctivae and sclerae normal  NECK: no adenopathy, no asymmetry, masses, or scars and thyroid normal to palpation  RESP: lungs clear to auscultation - no rales, rhonchi or wheezes  CV: regular rate and rhythm, normal S1 S2, no S3 or S4, no murmur, click or rub, no peripheral edema and peripheral pulses strong  ABDOMEN: soft, nontender, no hepatosplenomegaly, no masses and bowel sounds normal  MS: no gross musculoskeletal defects noted, no edema  SKIN: no suspicious lesions or rashes  NEURO: Normal strength and tone, mentation intact and speech normal.  Lower extremities sensorium intact to monofilament  PSYCH: mentation appears normal, affect normal/bright    Office Visit on 10/12/2021   Component Date Value Ref Range Status     Sodium 10/12/2021 139  133 - 144 mmol/L Final     Potassium 10/12/2021 4.4  3.4 - 5.3 mmol/L Final     Chloride 10/12/2021 106  94 - 109 mmol/L Final     Carbon Dioxide (CO2) 10/12/2021 26  20 - 32 mmol/L Final     Anion Gap 10/12/2021 7  3 - 14 mmol/L Final     Urea Nitrogen 10/12/2021 15  7 - 30 mg/dL Final     Creatinine 10/12/2021 0.72  0.66 - 1.25 mg/dL Final     Calcium 10/12/2021 9.0  8.5 - 10.1 mg/dL Final     Glucose 10/12/2021 83  70 - 99 mg/dL Final     Alkaline Phosphatase 10/12/2021 78  40 - 150 U/L Final     AST 10/12/2021 16  0 - 45 U/L Final     ALT 10/12/2021 34  0 - 70 U/L Final     Protein Total 10/12/2021 7.5  6.8 - 8.8 g/dL Final     Albumin 10/12/2021 3.6  3.4 - 5.0 g/dL Final     Bilirubin Total " 10/12/2021 0.5  0.2 - 1.3 mg/dL Final     GFR Estimate 10/12/2021 >90  >60 mL/min/1.73m2 Final    As of July 11, 2021, eGFR is calculated by the CKD-EPI creatinine equation, without race adjustment. eGFR can be influenced by muscle mass, exercise, and diet. The reported eGFR is an estimation only and is only applicable if the renal function is stable.     Creatinine Urine mg/dL 10/12/2021 110  mg/dL Final     Albumin Urine mg/L 10/12/2021 10  mg/L Final     Albumin Urine mg/g Cr 10/12/2021 9.09  0.00 - 17.00 mg/g Cr Final     Cholesterol 10/12/2021 142  <200 mg/dL Final     Triglycerides 10/12/2021 219 (A) <150 mg/dL Final     Direct Measure HDL 10/12/2021 33 (A) >=40 mg/dL Final     LDL Cholesterol Calculated 10/12/2021 65  <=100 mg/dL Final     Non HDL Cholesterol 10/12/2021 109  <130 mg/dL Final     Patient Fasting > 8hrs? 10/12/2021 No   Final     Hemoglobin A1C 10/12/2021 6.6 (A) 0.0 - 5.6 % Final    Normal <5.7%   Prediabetes 5.7-6.4%    Diabetes 6.5% or higher     Note: Adopted from ADA consensus guidelines.     Prostate Specific Antigen Screen 10/12/2021 0.44  0.00 - 4.00 ug/L Final     TSH 10/12/2021 1.71  0.40 - 4.00 mU/L Final     WBC Count 10/12/2021 9.5  4.0 - 11.0 10e3/uL Final     RBC Count 10/12/2021 5.07  4.40 - 5.90 10e6/uL Final     Hemoglobin 10/12/2021 14.5  13.3 - 17.7 g/dL Final     Hematocrit 10/12/2021 43.3  40.0 - 53.0 % Final     MCV 10/12/2021 85  78 - 100 fL Final     MCH 10/12/2021 28.6  26.5 - 33.0 pg Final     MCHC 10/12/2021 33.5  31.5 - 36.5 g/dL Final     RDW 10/12/2021 13.6  10.0 - 15.0 % Final     Platelet Count 10/12/2021 203  150 - 450 10e3/uL Final     % Neutrophils 10/12/2021 49  % Final     % Lymphocytes 10/12/2021 37  % Final     % Monocytes 10/12/2021 9  % Final     % Eosinophils 10/12/2021 5  % Final     % Basophils 10/12/2021 0  % Final     Absolute Neutrophils 10/12/2021 4.6  1.6 - 8.3 10e3/uL Final     Absolute Lymphocytes 10/12/2021 3.5  0.8 - 5.3 10e3/uL Final      Absolute Monocytes 10/12/2021 0.9  0.0 - 1.3 10e3/uL Final     Absolute Eosinophils 10/12/2021 0.4  0.0 - 0.7 10e3/uL Final     Absolute Basophils 10/12/2021 0.0  0.0 - 0.2 10e3/uL Final

## 2022-04-12 NOTE — PATIENT INSTRUCTIONS
Rommel;  We will check your blood work today.  I did notice that your blood pressure is higher than it was visit.  Please check this at home.  If your blood pressure continues to be greater than 130/80 increase your lisinopril to 10 mg and notify me.    We will obtain a couple of blood tests today to check up on your blood sugar levels.      Continue to work on diet and exercise.  The more weight you lose the better your breathing will be and the less strain on your heart.    See you in 6 months    Enio Hinton MD on 4/12/2022 at 7:23 AM

## 2022-08-15 DIAGNOSIS — E11.9 TYPE 2 DIABETES MELLITUS WITHOUT COMPLICATION, WITHOUT LONG-TERM CURRENT USE OF INSULIN (H): ICD-10-CM

## 2022-08-18 NOTE — TELEPHONE ENCOUNTER
Prescription approved per [facility] Refill Protocol.    Janina Arana, RN BSN MSN  [facility]

## 2022-09-22 DIAGNOSIS — E11.9 TYPE 2 DIABETES MELLITUS WITHOUT COMPLICATION, WITHOUT LONG-TERM CURRENT USE OF INSULIN (H): ICD-10-CM

## 2022-09-26 RX ORDER — LISINOPRIL 5 MG/1
TABLET ORAL
Qty: 90 TABLET | Refills: 3 | Status: SHIPPED | OUTPATIENT
Start: 2022-09-26 | End: 2022-10-18

## 2022-09-26 NOTE — TELEPHONE ENCOUNTER
Routing refill request to provider for review/approval because:  BP Readings from Last 6 Encounters:   04/12/22 (!) 142/79   10/26/21 120/62   10/12/21 134/78   09/03/21 (!) 148/71   08/27/21 (!) 148/74   11/20/20 (!) 143/81       Josseline Cotto RN

## 2022-10-17 NOTE — PROGRESS NOTES
"SUBJECTIVE:   Rommel is a 67 year old who presents for Preventive Visit.    Patient has been advised of split billing requirements and indicates understanding: Yes  Are you in the first 12 months of your Medicare coverage?  { :757847::\"No\"}    HPI  Do you feel safe in your environment? { :275077}    Have you ever done Advance Care Planning? (For example, a Health Directive, POLST, or a discussion with a medical provider or your loved ones about your wishes): { :352099}       Fall risk  { :034969}    Cognitive Screening { :010769}    {Do you have sleep apnea, excessive snoring or daytime drowsiness? (Optional):882709}    Reviewed and updated as needed this visit by clinical staff                  Reviewed and updated as needed this visit by Provider                 Social History     Tobacco Use     Smoking status: Never     Smokeless tobacco: Never   Substance Use Topics     Alcohol use: Yes     Comment: maybe 1-2  glasses a year  special occasion      {Rooming Staff- Complete this question if Prescreen response is not shown below for today's visit. If you drink alcohol do you typically have >3 drinks per day or >7 drinks per week? (Optional):818675}    Alcohol Use 2/25/2014   Prescreen: >3 drinks/day or >7 drinks/week? The patient does not drink >3 drinks per day nor >7 drinks per week.   {add AUDIT responses (Optional) (A score of 7 for adult men is an indication of hazardous drinking; a score of 8 or more is an indication of an alcohol use disorder.  A score of 7 or more for adult women is an indication of hazardous drinking or an alchohol use disorder):306457}    {Outside tests to abstract? :443340}    {additional problems to add (Optional):150554}    Current providers sharing in care for this patient include: {Rooming staff:  Please update Care Team in Rooming Activity, refresh this note and then delete this statement}  Patient Care Team:  [facility] as PCP - Enio Nails MD " "as Assigned PCP  Enio Hinton MD (Internal Medicine)  Denice Chong PA-C as Physician Assistant (Dermatology)  Denice Chong PA-C as Assigned Surgical Provider    The following health maintenance items are reviewed in Epic and correct as of today:  Health Maintenance   Topic Date Due     ANNUAL REVIEW OF HM ORDERS  Never done     ZOSTER IMMUNIZATION (1 of 2) Never done     Pneumococcal Vaccine: 65+ Years (2 - PCV) 2014     ADVANCE CARE PLANNING  2018     AORTIC ANEURYSM SCREENING (SYSTEM ASSIGNED)  Never done     MEDICARE ANNUAL WELLNESS VISIT  2020     INFLUENZA VACCINE (1) 2022     A1C  10/12/2022     LIPID  10/12/2022     MICROALBUMIN  10/12/2022     ASTHMA CONTROL TEST  10/12/2022     BMP  2023     DIABETIC FOOT EXAM  2023     ASTHMA ACTION PLAN  2023     EYE EXAM  2023     FALL RISK ASSESSMENT  2023     COLORECTAL CANCER SCREENING  2024     DTAP/TDAP/TD IMMUNIZATION (3 - Td or Tdap) 2028     HEPATITIS C SCREENING  Completed     PHQ-2 (once per calendar year)  Completed     COVID-19 Vaccine  Completed     IPV IMMUNIZATION  Aged Out     MENINGITIS IMMUNIZATION  Aged Out     {Chronicprobdata (optional):184871}  {Decision Support (Optional):403073}        Review of Systems  {ROS COMP (Optional):249911}    OBJECTIVE:   There were no vitals taken for this visit. Estimated body mass index is 42.09 kg/m  as calculated from the following:    Height as of 22: 1.753 m (5' 9\").    Weight as of 22: 129.3 kg (285 lb).  Physical Exam  {Exam (Optional) :827149}    {Diagnostic Test Results (Optional):550927::\"Diagnostic Test Results:\",\"Labs reviewed in Epic\"}    ASSESSMENT / PLAN:   {Diag Picklist:993988}    {Patient advised of split billing (Optional):581808}    COUNSELING:  {Medicare Counselin}    Estimated body mass index is 42.09 kg/m  as calculated from the following:    Height as of 22: 1.753 m (5' 9\").    " Weight as of 4/12/22: 129.3 kg (285 lb).    {Weight Management Plan (ACO) Complete if BMI is abnormal-  Ages 18-64  BMI >24.9.  Age 65+ with BMI <23 or >30 (Optional):878213}    He reports that he has never smoked. He has never used smokeless tobacco.      Appropriate preventive services were discussed with this patient, including applicable screening as appropriate for cardiovascular disease, diabetes, osteopenia/osteoporosis, and glaucoma.  As appropriate for age/gender, discussed screening for colorectal cancer, prostate cancer, breast cancer, and cervical cancer. Checklist reviewing preventive services available has been given to the patient.    Reviewed patients plan of care and provided an AVS. The {CarePlan:820573} for Rommel meets the Care Plan requirement. This Care Plan has been established and reviewed with the {PATIENT, FAMILY MEMBER, CAREGIVER:771025}.    Counseling Resources:  ATP IV Guidelines  Pooled Cohorts Equation Calculator  Breast Cancer Risk Calculator  Breast Cancer: Medication to Reduce Risk  FRAX Risk Assessment  ICSI Preventive Guidelines  Dietary Guidelines for Americans, 2010  USDA's MyPlate  ASA Prophylaxis  Lung CA Screening    Enio Hinton MD  [facility]    Identified Health Risks:

## 2022-10-18 ENCOUNTER — OFFICE VISIT (OUTPATIENT)
Dept: FAMILY MEDICINE | Facility: CLINIC | Age: 67
End: 2022-10-18
Payer: COMMERCIAL

## 2022-10-18 VITALS
HEIGHT: 69 IN | DIASTOLIC BLOOD PRESSURE: 78 MMHG | OXYGEN SATURATION: 94 % | TEMPERATURE: 97.7 F | HEART RATE: 87 BPM | RESPIRATION RATE: 16 BRPM | BODY MASS INDEX: 43.25 KG/M2 | WEIGHT: 292 LBS | SYSTOLIC BLOOD PRESSURE: 138 MMHG

## 2022-10-18 DIAGNOSIS — Z00.00 ENCOUNTER FOR PREVENTIVE HEALTH EXAMINATION: Primary | ICD-10-CM

## 2022-10-18 DIAGNOSIS — Z13.220 SCREENING FOR HYPERLIPIDEMIA: ICD-10-CM

## 2022-10-18 DIAGNOSIS — E11.9 TYPE 2 DIABETES MELLITUS WITHOUT COMPLICATION, WITHOUT LONG-TERM CURRENT USE OF INSULIN (H): ICD-10-CM

## 2022-10-18 LAB
ALBUMIN SERPL-MCNC: 3.7 G/DL (ref 3.4–5)
ALP SERPL-CCNC: 81 U/L (ref 40–150)
ALT SERPL W P-5'-P-CCNC: 63 U/L (ref 0–70)
ANION GAP SERPL CALCULATED.3IONS-SCNC: 9 MMOL/L (ref 3–14)
AST SERPL W P-5'-P-CCNC: 28 U/L (ref 0–45)
BASOPHILS # BLD AUTO: 0 10E3/UL (ref 0–0.2)
BASOPHILS NFR BLD AUTO: 0 %
BILIRUB SERPL-MCNC: 0.4 MG/DL (ref 0.2–1.3)
BUN SERPL-MCNC: 26 MG/DL (ref 7–30)
CALCIUM SERPL-MCNC: 8.9 MG/DL (ref 8.5–10.1)
CHLORIDE BLD-SCNC: 106 MMOL/L (ref 94–109)
CHOLEST SERPL-MCNC: 154 MG/DL
CO2 SERPL-SCNC: 25 MMOL/L (ref 20–32)
CREAT SERPL-MCNC: 0.58 MG/DL (ref 0.66–1.25)
CREAT UR-MCNC: 137 MG/DL
EOSINOPHIL # BLD AUTO: 0.4 10E3/UL (ref 0–0.7)
EOSINOPHIL NFR BLD AUTO: 6 %
ERYTHROCYTE [DISTWIDTH] IN BLOOD BY AUTOMATED COUNT: 12.8 % (ref 10–15)
FASTING STATUS PATIENT QL REPORTED: YES
GFR SERPL CREATININE-BSD FRML MDRD: >90 ML/MIN/1.73M2
GLUCOSE BLD-MCNC: 211 MG/DL (ref 70–99)
HBA1C MFR BLD: 9.3 % (ref 0–5.6)
HCT VFR BLD AUTO: 43 % (ref 40–53)
HDLC SERPL-MCNC: 44 MG/DL
HGB BLD-MCNC: 14 G/DL (ref 13.3–17.7)
IMM GRANULOCYTES # BLD: 0 10E3/UL
IMM GRANULOCYTES NFR BLD: 0 %
LDLC SERPL CALC-MCNC: 85 MG/DL
LYMPHOCYTES # BLD AUTO: 2.4 10E3/UL (ref 0.8–5.3)
LYMPHOCYTES NFR BLD AUTO: 34 %
MCH RBC QN AUTO: 27.6 PG (ref 26.5–33)
MCHC RBC AUTO-ENTMCNC: 32.6 G/DL (ref 31.5–36.5)
MCV RBC AUTO: 85 FL (ref 78–100)
MICROALBUMIN UR-MCNC: 34 MG/L
MICROALBUMIN/CREAT UR: 24.82 MG/G CR (ref 0–17)
MONOCYTES # BLD AUTO: 0.8 10E3/UL (ref 0–1.3)
MONOCYTES NFR BLD AUTO: 10 %
NEUTROPHILS # BLD AUTO: 3.6 10E3/UL (ref 1.6–8.3)
NEUTROPHILS NFR BLD AUTO: 50 %
NONHDLC SERPL-MCNC: 110 MG/DL
PLATELET # BLD AUTO: 207 10E3/UL (ref 150–450)
POTASSIUM BLD-SCNC: 4.1 MMOL/L (ref 3.4–5.3)
PROT SERPL-MCNC: 7.5 G/DL (ref 6.8–8.8)
PSA SERPL-MCNC: 0.51 UG/L (ref 0–4)
RBC # BLD AUTO: 5.08 10E6/UL (ref 4.4–5.9)
SODIUM SERPL-SCNC: 140 MMOL/L (ref 133–144)
TRIGL SERPL-MCNC: 123 MG/DL
TSH SERPL DL<=0.005 MIU/L-ACNC: 3.24 MU/L (ref 0.4–4)
WBC # BLD AUTO: 7.3 10E3/UL (ref 4–11)

## 2022-10-18 PROCEDURE — 36415 COLL VENOUS BLD VENIPUNCTURE: CPT | Performed by: INTERNAL MEDICINE

## 2022-10-18 PROCEDURE — 80050 GENERAL HEALTH PANEL: CPT | Performed by: INTERNAL MEDICINE

## 2022-10-18 PROCEDURE — G0103 PSA SCREENING: HCPCS | Performed by: INTERNAL MEDICINE

## 2022-10-18 PROCEDURE — 80061 LIPID PANEL: CPT | Performed by: INTERNAL MEDICINE

## 2022-10-18 PROCEDURE — 82043 UR ALBUMIN QUANTITATIVE: CPT | Performed by: INTERNAL MEDICINE

## 2022-10-18 PROCEDURE — 99397 PER PM REEVAL EST PAT 65+ YR: CPT | Performed by: INTERNAL MEDICINE

## 2022-10-18 PROCEDURE — 83036 HEMOGLOBIN GLYCOSYLATED A1C: CPT | Performed by: INTERNAL MEDICINE

## 2022-10-18 RX ORDER — LISINOPRIL 10 MG/1
10 TABLET ORAL DAILY
Qty: 90 TABLET | Refills: 3 | Status: SHIPPED | OUTPATIENT
Start: 2022-10-18 | End: 2023-09-28

## 2022-10-18 ASSESSMENT — ASTHMA QUESTIONNAIRES
ACT_TOTALSCORE: 25
QUESTION_4 LAST FOUR WEEKS HOW OFTEN HAVE YOU USED YOUR RESCUE INHALER OR NEBULIZER MEDICATION (SUCH AS ALBUTEROL): NOT AT ALL
ACT_TOTALSCORE: 25
QUESTION_1 LAST FOUR WEEKS HOW MUCH OF THE TIME DID YOUR ASTHMA KEEP YOU FROM GETTING AS MUCH DONE AT WORK, SCHOOL OR AT HOME: NONE OF THE TIME
QUESTION_3 LAST FOUR WEEKS HOW OFTEN DID YOUR ASTHMA SYMPTOMS (WHEEZING, COUGHING, SHORTNESS OF BREATH, CHEST TIGHTNESS OR PAIN) WAKE YOU UP AT NIGHT OR EARLIER THAN USUAL IN THE MORNING: NOT AT ALL
QUESTION_5 LAST FOUR WEEKS HOW WOULD YOU RATE YOUR ASTHMA CONTROL: COMPLETELY CONTROLLED
QUESTION_2 LAST FOUR WEEKS HOW OFTEN HAVE YOU HAD SHORTNESS OF BREATH: NOT AT ALL

## 2022-10-18 ASSESSMENT — ENCOUNTER SYMPTOMS
SORE THROAT: 0
HEARTBURN: 0
EYE PAIN: 0
HEMATOCHEZIA: 0
DIZZINESS: 0
ARTHRALGIAS: 0
PARESTHESIAS: 0
NAUSEA: 0
WEAKNESS: 0
MYALGIAS: 0
CONSTIPATION: 0
NERVOUS/ANXIOUS: 0
SHORTNESS OF BREATH: 0
HEMATURIA: 0
CHILLS: 0
ABDOMINAL PAIN: 0
DYSURIA: 0
JOINT SWELLING: 0
FEVER: 0
HEADACHES: 0
PALPITATIONS: 0
COUGH: 0
FREQUENCY: 0

## 2022-10-18 ASSESSMENT — PAIN SCALES - GENERAL: PAINLEVEL: NO PAIN (0)

## 2022-10-18 ASSESSMENT — ACTIVITIES OF DAILY LIVING (ADL): CURRENT_FUNCTION: NO ASSISTANCE NEEDED

## 2022-10-18 NOTE — PATIENT INSTRUCTIONS
Rommel;  We will obtain some lab work today to see how your cholesterol and blood sugars are.    Continue to work on diet and exercise.  If your hemoglobin A1c is elevated, diet and exercise will be the most beneficial way to lower and control your diabetes.    Blood pressure is close to a reasonable level.  I recommend you increase your Zestril to 10 mg daily.  This change was made in your medical record    Recommend you follow-up with Dr. Sky in 6 months.    Best regards  Enio

## 2022-10-18 NOTE — PROGRESS NOTES
"SUBJECTIVE:   Rommel is a 67 year old who presents for Preventive Visit.    Patient with a history of diabetes mellitus and hypertension.  Overall he was doing well however he continues to struggle with weight.  States he is not exercising.  States his diet has not been the best as well.        Are you in the first 12 months of your Medicare coverage?  No    Healthy Habits:     In general, how would you rate your overall health?  Fair    Frequency of exercise:  1 day/week    Duration of exercise:  Less than 15 minutes    Do you usually eat at least 4 servings of fruit and vegetables a day, include whole grains    & fiber and avoid regularly eating high fat or \"junk\" foods?  Yes    Taking medications regularly:  Yes    Medication side effects:  None    Ability to successfully perform activities of daily living:  No assistance needed    Home Safety:  No safety concerns identified    Hearing Impairment:  Need to ask people to speak up or repeat themselves    In the past 6 months, have you been bothered by leaking of urine?  No    In general, how would you rate your overall mental or emotional health?  Good      PHQ-2 Total Score: 0    Do you feel safe in your environment? Yes    Have you ever done Advance Care Planning? (For example, a Health Directive, POLST, or a discussion with a medical provider or your loved ones about your wishes): No, advance care planning information given to patient to review.  Patient plans to discuss their wishes with loved ones or provider.         Fall risk  Fallen 2 or more times in the past year?: No  Any fall with injury in the past year?: No    Cognitive Screening   1) Repeat 3 items (Leader, Season, Table)    2) Clock draw: NORMAL  3) 3 item recall: Recalls 3 objects  Results: 3 items recalled: COGNITIVE IMPAIRMENT LESS LIKELY    Mini-CogTM Copyright FREDRICK Ro. Licensed by the author for use in [facility]; reprinted with permission ([email or web address]@.[email or web address]). All rights reserved.  "     Do you have sleep apnea, excessive snoring or daytime drowsiness?: no    Reviewed and updated as needed this visit by clinical staff   Tobacco  Allergies  Meds              Reviewed and updated as needed this visit by Provider                 Social History     Tobacco Use     Smoking status: Never     Smokeless tobacco: Never   Substance Use Topics     Alcohol use: Yes     Comment: maybe 1-2  glasses a year  special occasion          Alcohol Use 10/18/2022   Prescreen: >3 drinks/day or >7 drinks/week? No   Prescreen: >3 drinks/day or >7 drinks/week? -   No flowsheet data found.            Current providers sharing in care for this patient include:   Patient Care Team:  Clinic - [facility] as PCP - Enio Nails MD as Assigned PCP  Enio Hinton MD (Internal Medicine)  Denice Chong PA-C as Physician Assistant (Dermatology)  Denice Chong PA-C as Assigned Surgical Provider    The following health maintenance items are reviewed in Epic and correct as of today:  Health Maintenance   Topic Date Due     ZOSTER IMMUNIZATION (1 of 2) Never done     Pneumococcal Vaccine: 65+ Years (2 - PCV) 01/04/2014     AORTIC ANEURYSM SCREENING (SYSTEM ASSIGNED)  Never done     MEDICARE ANNUAL WELLNESS VISIT  04/16/2020     INFLUENZA VACCINE (1) 09/01/2022     A1C  10/12/2022     LIPID  10/12/2022     MICROALBUMIN  10/12/2022     BMP  04/12/2023     DIABETIC FOOT EXAM  04/12/2023     ASTHMA ACTION PLAN  04/12/2023     EYE EXAM  04/12/2023     ASTHMA CONTROL TEST  04/18/2023     ANNUAL REVIEW OF HM ORDERS  10/18/2023     FALL RISK ASSESSMENT  10/18/2023     COLORECTAL CANCER SCREENING  03/11/2024     ADVANCE CARE PLANNING  10/18/2027     DTAP/TDAP/TD IMMUNIZATION (3 - Td or Tdap) 01/11/2028     HEPATITIS C SCREENING  Completed     PHQ-2 (once per calendar year)  Completed     COVID-19 Vaccine  Completed     IPV IMMUNIZATION  Aged Out     MENINGITIS IMMUNIZATION  Aged Out     Lab  "work is in process          Review of Systems  Constitutional, HEENT, cardiovascular, pulmonary, GI, , musculoskeletal, neuro, skin, endocrine and psych systems are negative, except as otherwise noted.    OBJECTIVE:   /78 (BP Location: Right arm, Patient Position: Sitting, Cuff Size: Adult Large)   Pulse 87   Temp 97.7  F (36.5  C) (Temporal)   Resp 16   Ht 1.753 m (5' 9\")   Wt 132.5 kg (292 lb)   SpO2 94%   BMI 43.12 kg/m   Estimated body mass index is 43.12 kg/m  as calculated from the following:    Height as of this encounter: 1.753 m (5' 9\").    Weight as of this encounter: 132.5 kg (292 lb).  Physical Exam  GENERAL: healthy, alert and no distress  EYES: Eyes grossly normal to inspection, PERRL and conjunctivae and sclerae normal  HENT: ear canals and TM's normal, nose and mouth without ulcers or lesions  NECK: no adenopathy, no asymmetry, masses, or scars and thyroid normal to palpation  RESP: lungs clear to auscultation - no rales, rhonchi or wheezes  CV: regular rate and rhythm, normal S1 S2, no S3 or S4, no murmur, click or rub, no peripheral edema and peripheral pulses strong  ABDOMEN: soft, nontender, no hepatosplenomegaly, no masses and bowel sounds normal  MS: no gross musculoskeletal defects noted, no edema  SKIN: no suspicious lesions or rashes  NEURO: Normal strength and tone, mentation intact and speech normal  PSYCH: mentation appears normal, affect normal/bright    Diagnostic Test Results:  Labs reviewed in Epic    ASSESSMENT / PLAN:       ICD-10-CM    1. Encounter for preventive health examination  Z00.00 Comprehensive metabolic panel (BMP + Alb, Alk Phos, ALT, AST, Total. Bili, TP)     PSA, screen     CBC with platelets and differential     TSH with free T4 reflex     Lipid panel reflex to direct LDL Fasting     Comprehensive metabolic panel (BMP + Alb, Alk Phos, ALT, AST, Total. Bili, TP)     PSA, screen     CBC with platelets and differential     TSH with free T4 reflex     Lipid " "panel reflex to direct LDL Fasting      2. Type 2 diabetes mellitus without complication, without long-term current use of insulin (H)  E11.9 Albumin Random Urine Quantitative with Creat Ratio     Hemoglobin A1c     lisinopril (ZESTRIL) 10 MG tablet     Albumin Random Urine Quantitative with Creat Ratio     Hemoglobin A1c      3. Screening for hyperlipidemia  Z13.220           Patient is aware that diet and exercise will be the cornerstone for his diabetic and blood pressure control.  Currently his blood pressure is a bit higher than I would like so we will increase his lisinopril from 5 mg to 10 mg daily.    Will return to clinic in 6 months.  At which juncture his microalbumin should be evaluated along with his hemoglobin A1c.  I suspect his current A1c will be 7-7.5 range.    Is up to date with his eye exam.  Monofilament foot examination was normal today.    COUNSELING:  Reviewed preventive health counseling, as reflected in patient instructions    Estimated body mass index is 43.12 kg/m  as calculated from the following:    Height as of this encounter: 1.753 m (5' 9\").    Weight as of this encounter: 132.5 kg (292 lb).    See H&P.  Patient working on diet and exercise.    He reports that he has never smoked. He has never used smokeless tobacco.      Appropriate preventive services were discussed with this patient, including applicable screening as appropriate for cardiovascular disease, diabetes, osteopenia/osteoporosis, and glaucoma.  As appropriate for age/gender, discussed screening for colorectal cancer, prostate cancer, breast cancer, and cervical cancer. Checklist reviewing preventive services available has been given to the patient.    Reviewed patients plan of care and provided an AVS. The Basic Care Plan (routine screening as documented in Health Maintenance) for Rommel meets the Care Plan requirement. This Care Plan has been established and reviewed with the Patient.    Counseling Resources:  ATP IV " Guidelines  Pooled Cohorts Equation Calculator  Breast Cancer Risk Calculator  Breast Cancer: Medication to Reduce Risk  FRAX Risk Assessment  ICSI Preventive Guidelines  Dietary Guidelines for Americans, 2010  Adreal's MyPlate  ASA Prophylaxis  Lung CA Screening    Enio Hinton MD  [facility]    Identified Health Risks:

## 2022-11-19 ENCOUNTER — HEALTH MAINTENANCE LETTER (OUTPATIENT)
Age: 67
End: 2022-11-19

## 2022-11-27 DIAGNOSIS — E11.9 TYPE 2 DIABETES MELLITUS WITHOUT COMPLICATION, WITHOUT LONG-TERM CURRENT USE OF INSULIN (H): ICD-10-CM

## 2022-11-30 ENCOUNTER — NURSE TRIAGE (OUTPATIENT)
Dept: NURSING | Facility: CLINIC | Age: 67
End: 2022-11-30

## 2022-11-30 RX ORDER — GLIPIZIDE 5 MG/1
TABLET ORAL
Qty: 90 TABLET | Refills: 0 | Status: SHIPPED | OUTPATIENT
Start: 2022-11-30 | End: 2023-02-13

## 2022-11-30 NOTE — TELEPHONE ENCOUNTER
Routing refill request to provider for review/approval because:  Labs out of range:  LDL       Sulfonylurea Agents Failed    Protocol Details  Patient has a recent creatinine (normal) within the past 12 mos.        .  Creatinine   Date Value Ref Range Status   10/18/2022 0.58 (L) 0.66 - 1.25 mg/dL Final   03/11/2020 0.77 0.66 - 1.25 mg/dL Final     Future Appointments 11/30/2022 - 5/29/2023    None

## 2022-11-30 NOTE — TELEPHONE ENCOUNTER
Telephone call:    [facility] calling in on behalf of patient.  Per pharmacy, patient is requesting lisinopril & glipizide.       Writer advised pharmacist or patient to call back w/ further questions.    Molly Mac RN on 11/30/2022 at 12:14 PM    Reason for Disposition    Pharmacy calling with prescription question and triager answers question    Protocols used: MEDICATION QUESTION CALL-A-[place]

## 2023-01-05 DIAGNOSIS — E78.5 HYPERLIPIDEMIA LDL GOAL <130: ICD-10-CM

## 2023-01-06 RX ORDER — ATORVASTATIN CALCIUM 40 MG/1
TABLET, FILM COATED ORAL
Qty: 90 TABLET | Refills: 3 | Status: SHIPPED | OUTPATIENT
Start: 2023-01-06 | End: 2023-12-11

## 2023-02-01 DIAGNOSIS — E11.9 TYPE 2 DIABETES MELLITUS WITHOUT COMPLICATION, WITHOUT LONG-TERM CURRENT USE OF INSULIN (H): ICD-10-CM

## 2023-02-11 DIAGNOSIS — E11.9 TYPE 2 DIABETES MELLITUS WITHOUT COMPLICATION, WITHOUT LONG-TERM CURRENT USE OF INSULIN (H): ICD-10-CM

## 2023-02-13 RX ORDER — GLIPIZIDE 5 MG/1
TABLET ORAL
Qty: 90 TABLET | Refills: 3 | Status: SHIPPED | OUTPATIENT
Start: 2023-02-13 | End: 2024-01-11

## 2023-03-08 NOTE — TELEPHONE ENCOUNTER
Dr. Tomlinson  Did you get the forms?    Follow-up Pulmonary Progress Note  Chief Complaint : Other nontraumatic intracerebral hemorrhage        pt alert   stoma remains open  with wheeze of air when speaking  able to phonate   confused unable to provide history or ROS      Allergies :No Known Allergies      PAST MEDICAL & SURGICAL HISTORY:  Substance abuse    Hypertension    Depression    Anxiety    No significant past surgical history        Medications:  MEDICATIONS  (STANDING):  AQUAPHOR (petrolatum Ointment) 1 Application(s) Topical two times a day  bacitracin   Ointment 1 Application(s) Topical daily  enoxaparin Injectable 90 milliGRAM(s) SubCutaneous <User Schedule>  gabapentin 600 milliGRAM(s) Oral at bedtime  melatonin 6 milliGRAM(s) Oral at bedtime  midodrine. 5 milliGRAM(s) Oral <User Schedule>  nystatin Powder 1 Application(s) Topical two times a day  pantoprazole   Suspension 40 milliGRAM(s) Oral daily  polyethylene glycol 3350 17 Gram(s) Oral daily  senna Syrup 5 milliLiter(s) Oral at bedtime  traZODone 100 milliGRAM(s) Oral at bedtime    MEDICATIONS  (PRN):  acetaminophen    Suspension .. 650 milliGRAM(s) Oral every 6 hours PRN Temp greater or equal to 38C (100.4F), Mild Pain (1 - 3)      Antibiotics History      Heme Medications   enoxaparin Injectable 90 milliGRAM(s) SubCutaneous <User Schedule>, 03-02-23 @ 00:00      GI Medications  pantoprazole   Suspension 40 milliGRAM(s) Oral daily, 03-01-23 @ 00:00, Routine  polyethylene glycol 3350 17 Gram(s) Oral daily, 02-28-23 @ 14:19, Routine  senna Syrup 5 milliLiter(s) Oral at bedtime, 02-28-23 @ 14:19, Routine           VITALS:  T(C): 36.6 (03-08-23 @ 08:48), Max: 36.8 (03-07-23 @ 19:37)  T(F): 97.9 (03-08-23 @ 08:48), Max: 98.2 (03-07-23 @ 19:37)  HR: 93 (03-08-23 @ 08:48) (91 - 93)  BP: 122/83 (03-08-23 @ 08:48) (109/79 - 122/83)  BP(mean): --  ABP: --  ABP(mean): --  RR: 16 (03-08-23 @ 08:48) (16 - 17)  SpO2: 96% (03-08-23 @ 08:48) (96% - 98%)  CVP(mm Hg): --  CVP(cm H2O): --    Ins and Outs     03-07-23 @ 07:01  -  03-08-23 @ 07:00  --------------------------------------------------------  IN: 2100 mL / OUT: 0 mL / NET: 2100 mL                I&O's Detail    07 Mar 2023 07:01  -  08 Mar 2023 07:00  --------------------------------------------------------  IN:    Free Water: 900 mL    TwoCal HN: 1200 mL  Total IN: 2100 mL    OUT:  Total OUT: 0 mL    Total NET: 2100 mL

## 2023-06-01 ENCOUNTER — HEALTH MAINTENANCE LETTER (OUTPATIENT)
Age: 68
End: 2023-06-01

## 2023-07-19 ENCOUNTER — OFFICE VISIT (OUTPATIENT)
Dept: URGENT CARE | Facility: URGENT CARE | Age: 68
End: 2023-07-19
Payer: COMMERCIAL

## 2023-07-19 VITALS
DIASTOLIC BLOOD PRESSURE: 82 MMHG | OXYGEN SATURATION: 94 % | BODY MASS INDEX: 42.68 KG/M2 | WEIGHT: 289 LBS | HEART RATE: 85 BPM | SYSTOLIC BLOOD PRESSURE: 152 MMHG | TEMPERATURE: 98.6 F

## 2023-07-19 DIAGNOSIS — S46.001A INJURY OF RIGHT ROTATOR CUFF, INITIAL ENCOUNTER: Primary | ICD-10-CM

## 2023-07-19 PROCEDURE — 99203 OFFICE O/P NEW LOW 30 MIN: CPT

## 2023-07-19 PROCEDURE — A4565 SLINGS: HCPCS

## 2023-07-19 RX ORDER — CYCLOBENZAPRINE HCL 10 MG
10 TABLET ORAL 3 TIMES DAILY PRN
Qty: 21 TABLET | Refills: 0 | Status: SHIPPED | OUTPATIENT
Start: 2023-07-19 | End: 2023-09-28

## 2023-07-19 ASSESSMENT — PAIN SCALES - GENERAL: PAINLEVEL: EXTREME PAIN (8)

## 2023-07-19 NOTE — PROGRESS NOTES
Injury of right rotator cuff, initial encounter  - ARM SLING, M  - cyclobenzaprine (FLEXERIL) 10 MG tablet; Take 1 tablet (10 mg) by mouth 3 times daily as needed for muscle spasms  - Orthopedic  Referral; Future    Rest the affected area as much as possible.  Apply ice for 15-20 minutes intermittently as needed and especially after any offending activity. Hot packs are better for muscle spasms and cramping. Daily stretching as tolerated.  As pain recedes, begin normal activities slowly as tolerated.  Consider Physical Therapy after 6 weeks if symptoms not better with conservative care.      Okay to take acetaminophen 500 mg- 2 tabs (Total of 1000 mg) every 8 hrs   Okay to take ibuprofen 200 mg- 3 tabs (Total of 600 mg) every 6 hours      Follow-up with orthopedics in 1 to 2 weeks if no improvement in that time.  Patient's symptoms suggest tendinitis of the rotator cuff rather than a complete tear of the rotator cuff.    Diego Hearn PA-C  [facility]    Subjective   68 year old who presents to clinic today for the following health issues:    Arm Pain       HPI     Where in your body do you have pain? Musculoskeletal problem/pain  Onset/Duration: Severe pain in upper right arm, hard to raise arm without pain x Sunday -unsure if related to moving a heavy tool box on Saturday- Sunday woke up with pain.  Pain seemed to gradually worsen through the day.  He does not recall ever having sudden pain or hearing a pop noise.  Description  Location: Right shoulder   Joint Swelling: No  Redness: No  Pain: YES  Warmth: No  Intensity:  moderate  Progression of Symptoms:  worsening  Accompanying signs and symptoms:   Fevers: No  Numbness/tingling/weakness: No  History  Trauma to the area: No  Recent illness:  No  Previous similar problem: No  Previous evaluation:  No  Precipitating or alleviating factors:  Aggravating factors include: Any movement of the shoulder   Therapies tried and outcome:  rest/inactivity, ice and Ibuprofen    Review of Systems   Review of Systems   See HPI    Objective    Temp: 98.6  F (37  C) Temp src: Tympanic BP: (!) 152/82 Pulse: 85     SpO2: 94 %       Physical Exam   Physical Exam  Constitutional:       General: He is not in acute distress.     Appearance: Normal appearance. He is not ill-appearing, toxic-appearing or diaphoretic.   HENT:      Head: Normocephalic and atraumatic.   Cardiovascular:      Rate and Rhythm: Normal rate.      Pulses: Normal pulses.   Pulmonary:      Effort: Pulmonary effort is normal. No respiratory distress.   Musculoskeletal:      Comments: Patient has tenderness over the lateral right deltoid with palpation.  He does seems to have limitation of his shoulder movements and is unable to lift his arm above his head.  He has pain with right-sided Collado test and right-sided Neer's test.  There is no swelling, warmth, or erythema over the shoulder.  I do not see any deformities.  He has full range of motion of his elbow wrist and fingers.   Neurological:      General: No focal deficit present.      Mental Status: He is alert and oriented to person, place, and time. Mental status is at baseline.      Gait: Gait normal.   Psychiatric:         Mood and Affect: Mood normal.         Behavior: Behavior normal.         Thought Content: Thought content normal.         Judgment: Judgment normal.          No results found for this or any previous visit (from the past 24 hour(s)).

## 2023-09-27 ASSESSMENT — ENCOUNTER SYMPTOMS
DIARRHEA: 0
HEADACHES: 0
PALPITATIONS: 0
HEARTBURN: 0
MYALGIAS: 0
PARESTHESIAS: 0
COUGH: 0
CHILLS: 0
ABDOMINAL PAIN: 0
DYSURIA: 0
ARTHRALGIAS: 0
HEMATOCHEZIA: 0
HEMATURIA: 0
WEAKNESS: 0
FREQUENCY: 0
FEVER: 0
DIZZINESS: 0
NAUSEA: 0
EYE PAIN: 0
SORE THROAT: 0
CONSTIPATION: 0
SHORTNESS OF BREATH: 0
JOINT SWELLING: 0

## 2023-09-27 ASSESSMENT — ASTHMA QUESTIONNAIRES: ACT_TOTALSCORE: 25

## 2023-09-27 ASSESSMENT — ACTIVITIES OF DAILY LIVING (ADL): CURRENT_FUNCTION: NO ASSISTANCE NEEDED

## 2023-09-27 NOTE — PROGRESS NOTES
"SUBJECTIVE:   Rmomel is a 68 year old who presents for Preventive Visit.      Are you in the first 12 months of your Medicare coverage?  No    Healthy Habits:     In general, how would you rate your overall health?  Good    Frequency of exercise:  None    Do you usually eat at least 4 servings of fruit and vegetables a day, include whole grains    & fiber and avoid regularly eating high fat or \"junk\" foods?  No    Taking medications regularly:  Yes    Medication side effects:  Not applicable    Ability to successfully perform activities of daily living:  No assistance needed    Home Safety:  No safety concerns identified    Hearing Impairment:  Difficulty understanding speech on the telephone and no hearing concerns    In the past 6 months, have you been bothered by leaking of urine?  No    In general, how would you rate your overall mental or emotional health?  Good    Additional concerns today:  No          Have you ever done Advance Care Planning? (For example, a Health Directive, POLST, or a discussion with a medical provider or your loved ones about your wishes): No, advance care planning information given to patient to review.  Patient plans to discuss their wishes with loved ones or provider.         Fall risk  Fallen 2 or more times in the past year?: No  Any fall with injury in the past year?: No    Cognitive Screening   1) Repeat 3 items (Leader, Season, Table)    2) Clock draw: NORMAL  3) 3 item recall: Recalls 2 objects   Results: NORMAL clock, 1-2 items recalled: COGNITIVE IMPAIRMENT LESS LIKELY    Mini-CogTM Copyright FREDRICK Ro. Licensed by the author for use in [facility]; reprinted with permission ([email or web address]). All rights reserved.      Do you have sleep apnea, excessive snoring or daytime drowsiness? : yes    Reviewed and updated as needed this visit by clinical staff   Tobacco  Allergies  Meds              Reviewed and updated as needed this visit by Provider                 Social " History     Tobacco Use    Smoking status: Never    Smokeless tobacco: Never   Substance Use Topics    Alcohol use: Yes     Comment: maybe 1-2  glasses a year  special occasion            9/27/2023     6:41 PM   Alcohol Use   Prescreen: >3 drinks/day or >7 drinks/week? Not Applicable     Do you have a current opioid prescription? No  Do you use any other controlled substances or medications that are not prescribed by a provider? None      Current providers sharing in care for this patient include:   Patient Care Team:  Clinic - [facility] as PCP - Enio Nails MD as Assigned PCP  Enio Hinton MD (Internal Medicine)  Denice Chong PA-C as Physician Assistant (Dermatology)    The following health maintenance items are reviewed in Epic and correct as of today:  Health Maintenance   Topic Date Due    ZOSTER IMMUNIZATION (1 of 2) Never done    Pneumococcal Vaccine: 65+ Years (2 - PCV) 01/04/2014    AORTIC ANEURYSM SCREENING (SYSTEM ASSIGNED)  Never done    COVID-19 Vaccine (6 - Moderna series) 02/01/2023    ASTHMA ACTION PLAN  04/12/2023    EYE EXAM  04/12/2023    A1C  04/18/2023    INFLUENZA VACCINE (1) 09/01/2023    BMP  10/18/2023    LIPID  10/18/2023    MICROALBUMIN  10/18/2023    DIABETIC FOOT EXAM  10/18/2023    COLORECTAL CANCER SCREENING  03/11/2024    ASTHMA CONTROL TEST  03/28/2024    MEDICARE ANNUAL WELLNESS VISIT  09/28/2024    ANNUAL REVIEW OF HM ORDERS  09/28/2024    FALL RISK ASSESSMENT  09/28/2024    DTAP/TDAP/TD IMMUNIZATION (2 - Td or Tdap) 01/11/2028    ADVANCE CARE PLANNING  09/28/2028    HEPATITIS C SCREENING  Completed    PHQ-2 (once per calendar year)  Completed    IPV IMMUNIZATION  Aged Out    HPV IMMUNIZATION  Aged Out    MENINGITIS IMMUNIZATION  Aged Out     Labs reviewed in EPIC          Review of Systems  Constitutional, HEENT, cardiovascular, pulmonary, GI, , musculoskeletal, neuro, skin, endocrine and psych systems are negative, except as otherwise  "noted.    OBJECTIVE:   /80 (BP Location: Right arm, Patient Position: Sitting, Cuff Size: Adult Large)   Pulse 77   Temp 98.1  F (36.7  C) (Oral)   Resp 18   Ht 1.753 m (5' 9\")   Wt 130.6 kg (288 lb)   SpO2 93%   BMI 42.53 kg/m   Estimated body mass index is 42.53 kg/m  as calculated from the following:    Height as of this encounter: 1.753 m (5' 9\").    Weight as of this encounter: 130.6 kg (288 lb).  Physical Exam  GENERAL: alert and no distress  EYES: Eyes grossly normal to inspection, conjunctivae and sclerae normal  HENT: ear canals and TM's normal, nose and mouth without ulcers or lesions  NECK: no asymmetry  RESP: lungs clear to auscultation  CV: regular rate and rhythm  ABDOMEN: soft, nontender, bowel sounds normal  MS: no gross musculoskeletal defects noted, no edema  SKIN: multiple skin moles present on the back  NEURO: Normal strength and tone, mentation intact and speech normal  PSYCH: mentation appears normal, affect normal/bright    Diagnostic Test Results:  Labs reviewed in Epic    ASSESSMENT / PLAN:   Rommel was seen today for physical and establish care.    Diagnoses and all orders for this visit:    Routine history and physical examination of adult  -     PSA, screen; Future    Type 2 diabetes mellitus without complication, without long-term current use of insulin (H)  -     Adult Eye  Referral; Future  -     HEMOGLOBIN A1C; Future  -     Albumin Random Urine Quantitative with Creat Ratio; Future  -     lisinopril (ZESTRIL) 10 MG tablet; Take 1 tablet (10 mg) by mouth daily  -     Creatinine; Future    Hyperlipidemia LDL goal <130  -     Lipid panel reflex to direct LDL Non-fasting; Future    Skin mole  -     Adult Dermatology Referral; Future    Other orders  -     REVIEW OF HEALTH MAINTENANCE PROTOCOL ORDERS  -     PRIMARY CARE FOLLOW-UP SCHEDULING; Future        Patient has been advised of split billing requirements and indicates understanding: Yes      COUNSELING:  Reviewed " "preventive health counseling, as reflected in patient instructions  Special attention given to:       Regular exercise       Healthy diet/nutrition      BMI:   Estimated body mass index is 42.53 kg/m  as calculated from the following:    Height as of this encounter: 1.753 m (5' 9\").    Weight as of this encounter: 130.6 kg (288 lb).   Weight management plan: Discussed healthy diet and exercise guidelines      He reports that he has never smoked. He has never used smokeless tobacco.      Appropriate preventive services were discussed with this patient, including applicable screening as appropriate for cardiovascular disease, diabetes, osteopenia/osteoporosis, and glaucoma.  As appropriate for age/gender, discussed screening for colorectal cancer, prostate cancer, breast cancer, and cervical cancer. Checklist reviewing preventive services available has been given to the patient.    Reviewed patients plan of care and provided an AVS. The Basic Care Plan (routine screening as documented in Health Maintenance) for Rommel meets the Care Plan requirement. This Care Plan has been established and reviewed with the Patient.          Beckie Khan MD  [facility]    Identified Health Risks:  I have reviewed Opioid Use Disorder and Substance Use Disorder risk factors and made any needed referrals.   He is at risk for lack of exercise and has been provided with information to increase physical activity for the benefit of his well-being.  The patient was counseled and encouraged to consider modifying their diet and eating habits. He was provided with information on recommended healthy diet options.  The patient was provided with written information regarding signs of hearing loss.  "

## 2023-09-28 ENCOUNTER — OFFICE VISIT (OUTPATIENT)
Dept: FAMILY MEDICINE | Facility: CLINIC | Age: 68
End: 2023-09-28
Payer: COMMERCIAL

## 2023-09-28 VITALS
SYSTOLIC BLOOD PRESSURE: 138 MMHG | WEIGHT: 288 LBS | TEMPERATURE: 98.1 F | BODY MASS INDEX: 42.65 KG/M2 | RESPIRATION RATE: 18 BRPM | DIASTOLIC BLOOD PRESSURE: 80 MMHG | HEART RATE: 77 BPM | HEIGHT: 69 IN | OXYGEN SATURATION: 93 %

## 2023-09-28 DIAGNOSIS — D22.9 SKIN MOLE: ICD-10-CM

## 2023-09-28 DIAGNOSIS — E11.9 TYPE 2 DIABETES MELLITUS WITHOUT COMPLICATION, WITHOUT LONG-TERM CURRENT USE OF INSULIN (H): ICD-10-CM

## 2023-09-28 DIAGNOSIS — Z00.00 ROUTINE HISTORY AND PHYSICAL EXAMINATION OF ADULT: Primary | ICD-10-CM

## 2023-09-28 DIAGNOSIS — E78.5 HYPERLIPIDEMIA LDL GOAL <130: ICD-10-CM

## 2023-09-28 LAB
CHOLEST SERPL-MCNC: 181 MG/DL
CREAT SERPL-MCNC: 0.71 MG/DL (ref 0.67–1.17)
CREAT UR-MCNC: 108 MG/DL
EGFRCR SERPLBLD CKD-EPI 2021: >90 ML/MIN/1.73M2
HBA1C MFR BLD: 10.7 % (ref 0–5.6)
HDLC SERPL-MCNC: 43 MG/DL
LDLC SERPL CALC-MCNC: 106 MG/DL
MICROALBUMIN UR-MCNC: <12 MG/L
MICROALBUMIN/CREAT UR: NORMAL MG/G{CREAT}
NONHDLC SERPL-MCNC: 138 MG/DL
PSA SERPL DL<=0.01 NG/ML-MCNC: 0.26 NG/ML (ref 0–4.5)
TRIGL SERPL-MCNC: 161 MG/DL

## 2023-09-28 PROCEDURE — G0103 PSA SCREENING: HCPCS | Performed by: INTERNAL MEDICINE

## 2023-09-28 PROCEDURE — 36415 COLL VENOUS BLD VENIPUNCTURE: CPT | Performed by: INTERNAL MEDICINE

## 2023-09-28 PROCEDURE — 82043 UR ALBUMIN QUANTITATIVE: CPT | Performed by: INTERNAL MEDICINE

## 2023-09-28 PROCEDURE — 99214 OFFICE O/P EST MOD 30 MIN: CPT | Mod: 25 | Performed by: INTERNAL MEDICINE

## 2023-09-28 PROCEDURE — 83036 HEMOGLOBIN GLYCOSYLATED A1C: CPT | Performed by: INTERNAL MEDICINE

## 2023-09-28 PROCEDURE — 99397 PER PM REEVAL EST PAT 65+ YR: CPT | Performed by: INTERNAL MEDICINE

## 2023-09-28 PROCEDURE — 82570 ASSAY OF URINE CREATININE: CPT | Performed by: INTERNAL MEDICINE

## 2023-09-28 PROCEDURE — 80061 LIPID PANEL: CPT | Performed by: INTERNAL MEDICINE

## 2023-09-28 PROCEDURE — 82565 ASSAY OF CREATININE: CPT | Performed by: INTERNAL MEDICINE

## 2023-09-28 RX ORDER — LISINOPRIL 10 MG/1
10 TABLET ORAL DAILY
Qty: 90 TABLET | Refills: 3 | Status: SHIPPED | OUTPATIENT
Start: 2023-09-28 | End: 2024-07-29

## 2023-09-28 ASSESSMENT — PAIN SCALES - GENERAL: PAINLEVEL: NO PAIN (0)

## 2023-09-28 ASSESSMENT — ACTIVITIES OF DAILY LIVING (ADL): CURRENT_FUNCTION: NO ASSISTANCE NEEDED

## 2023-09-28 NOTE — PATIENT INSTRUCTIONS
"Patient Education   Personalized Prevention Plan  You are due for the preventive services outlined below.  Your care team is available to assist you in scheduling these services.  If you have already completed any of these items, please share that information with your care team to update in your medical record.  Health Maintenance Due   Topic Date Due     Zoster (Shingles) Vaccine (1 of 2) Never done     Pneumococcal Vaccine (2 - PCV) 01/04/2014     AORTIC ANEURYSM SCREENING (SYSTEM ASSIGNED)  Never done     COVID-19 Vaccine (6 - Moderna series) 02/01/2023     Asthma Action Plan - yearly  04/12/2023     Eye Exam  04/12/2023     A1C Lab  04/18/2023     Flu Vaccine (1) 09/01/2023     Basic Metabolic Panel  10/18/2023     Cholesterol Lab  10/18/2023     Kidney Microalbumin Urine Test  10/18/2023     Diabetic Foot Exam  10/18/2023     Learning About Being Physically Active  What is physical activity?     Being physically active means doing any kind of activity that gets your body moving.  The types of physical activity that can help you get fit and stay healthy include:  Aerobic or \"cardio\" activities. These make your heart beat faster and make you breathe harder, such as brisk walking, riding a bike, or running. They strengthen your heart and lungs and build up your endurance.  Strength training activities. These make your muscles work against, or \"resist,\" something. Examples include lifting weights or doing push-ups. These activities help tone and strengthen your muscles and bones.  Stretches. These let you move your joints and muscles through their full range of motion. Stretching helps you be more flexible.  Reaching a balance between these three types of physical activity is important because each one contributes to your overall fitness.  What are the benefits of being active?  Being active is one of the best things you can do for your health. It helps you to:  Feel stronger and have more energy to do all the " "things you like to do.  Focus better at school or work.  Feel, think, and sleep better.  Reach and stay at a healthy weight.  Lose fat and build lean muscle.  Lower your risk for serious health problems, including diabetes, heart attack, high blood pressure, and some cancers.  Keep your heart, lungs, bones, muscles, and joints strong and healthy.  How can you make being active part of your life?  Start slowly. Make it your long-term goal to get at least 30 minutes of exercise on most days of the week. Walking is a good choice. You also may want to do other activities, such as running, swimming, cycling, or playing tennis or team sports.  Pick activities that you like--ones that make your heart beat faster, your muscles stronger, and your muscles and joints more flexible. If you find more than one thing you like doing, do them all. You don't have to do the same thing every day.  Get your heart pumping every day. Any activity that makes your heart beat faster and keeps it at that rate for a while counts.  Here are some great ways to get your heart beating faster:  Go for a brisk walk, run, or bike ride.  Go for a hike or swim.  Go in-line skating.  Play a game of touch football, basketball, or soccer.  Ride a bike.  Play tennis or racquetball.  Climb stairs.  Even some household chores can be aerobic--just do them at a faster pace. Vacuuming, raking or mowing the lawn, sweeping the garage, and washing and waxing the car all can help get your heart rate up.  Strengthen your muscles during the week. You don't have to lift heavy weights or grow big, bulky muscles to get stronger. Doing a few simple activities that make your muscles work against, or \"resist,\" something can help you get stronger.  For example, you can:  Do push-ups or sit-ups, which use your own body weight as resistance.  Lift weights or dumbbells or use stretch bands at home or in a gym or community center.  Stretch your muscles often. Stretching will " "help you as you become more active. It can help you stay flexible, loosen tight muscles, and avoid injury. It can also help improve your balance and posture and can be a great way to relax.  Be sure to stretch the muscles you'll be using when you work out. It's best to warm your muscles slightly before you stretch them. Walk or do some other light aerobic activity for a few minutes, and then start stretching.  When you stretch your muscles:  Do it slowly. Stretching is not about going fast or making sudden movements.  Don't push or bounce during a stretch.  Hold each stretch for at least 15 to 30 seconds, if you can. You should feel a stretch in the muscle, but not pain.  Breathe out as you do the stretch. Then breathe in as you hold the stretch. Don't hold your breath.  If you're worried about how more activity might affect your health, have a checkup before you start. Follow any special advice your doctor gives you for getting a smart start.  Where can you learn more?  Go to https://www.Viratech.SoftoCoupon/patiented  Enter W332 in the search box to learn more about \"Learning About Being Physically Active.\"  Current as of: October 10, 2022               Content Version: 13.7    8739-7254 [employer].   Care instructions adapted under license by your healthcare professional. If you have questions about a medical condition or this instruction, always ask your healthcare professional. [employer] disclaims any warranty or liability for your use of this information.      Learning About Dietary Guidelines  What are the Dietary Guidelines for Americans?     Dietary Guidelines for Americans provide tips for eating well and staying healthy. This helps reduce the risk for long-term (chronic) diseases.  These guidelines recommend that you:  Eat and drink the right amount for you. The U.[place] government's food guide is called MyPlate. It can help you make your own well-balanced eating plan.  Try to balance " "your eating with your activity. This helps you stay at a healthy weight.  Drink alcohol in moderation, if at all.  Limit foods high in salt, saturated fat, trans fat, and added sugar.  These guidelines are from the U.S. Department of Agriculture and the U.S. Department of Health and Human Services. They are updated every 5 years.  What is MyPlate?  MyPlate is the U.[place] government's food guide. It can help you make your own well-balanced eating plan. A balanced eating plan means that you eat enough, but not too much, and that your food gives you the nutrients you need to stay healthy.  MyPlate focuses on eating plenty of whole grains, fruits, and vegetables, and on limiting fat and sugar. It is available online at www.ChooseMyPlate.gov.  How can you get started?  If you're trying to eat healthier, you can slowly change your eating habits over time. You don't have to make big changes all at once. Start by adding one or two healthy foods to your meals each day.  Grains  Choose whole-grain breads and cereals and whole-wheat pasta and whole-grain crackers.  Vegetables  Eat a variety of vegetables every day. They have lots of nutrients and are part of a heart-healthy diet.  Fruits  Eat a variety of fruits every day. Fruits contain lots of nutrients. Choose fresh fruit instead of fruit juice.  Protein foods  Choose fish and lean poultry more often. Eat red meat and fried meats less often. Dried beans, tofu, and nuts are also good sources of protein.  Dairy  Choose low-fat or fat-free products from this food group. If you have problems digesting milk, try eating cheese or yogurt instead.  Fats and oils  Limit fats and oils if you're trying to cut calories. Choose healthy fats when you cook. These include canola oil and olive oil.  Where can you learn more?  Go to https://www.healthwise.net/patiented  Enter D676 in the search box to learn more about \"Learning About Dietary Guidelines.\"  Current as of: March 1, " 2023               Content Version: 13.7    9590-2842 [employer].   Care instructions adapted under license by your healthcare professional. If you have questions about a medical condition or this instruction, always ask your healthcare professional. [employer] disclaims any warranty or liability for your use of this information.      Hearing Loss: Care Instructions  Overview     Hearing loss is a sudden or slow decrease in how well you hear. It can range from slight to profound. Permanent hearing loss can occur with aging. It also can happen when you are exposed long-term to loud noise. Examples include listening to loud music, riding motorcycles, or being around other loud machines.  Hearing loss can affect your work and home life. It can make you feel lonely or depressed. You may feel that you have lost your independence. But hearing aids and other devices can help you hear better and feel connected to others.  Follow-up care is a key part of your treatment and safety. Be sure to make and go to all appointments, and call your doctor if you are having problems. It's also a good idea to know your test results and keep a list of the medicines you take.  How can you care for yourself at home?  Avoid loud noises whenever possible. This helps keep your hearing from getting worse.  Always wear hearing protection around loud noises.  Wear a hearing aid as directed.  A professional can help you pick a hearing aid that will work best for you.  You can also get hearing aids over the counter for mild to moderate hearing loss.  Have hearing tests as your doctor suggests. They can show whether your hearing has changed. Your hearing aid may need to be adjusted.  Use other devices as needed. These may include:  Telephone amplifiers and hearing aids that can connect to a television, stereo, radio, or microphone.  Devices that use lights or vibrations. These alert you to the doorbell, a ringing  "telephone, or a baby monitor.  Television closed-captioning. This shows the words at the bottom of the screen. Most new TVs can do this.  TTY (text telephone). This lets you type messages back and forth on the telephone instead of talking or listening. These devices are also called TDD. When messages are typed on the keyboard, they are sent over the phone line to a receiving TTY. The message is shown on a monitor.  Use text messaging, social media, and email if it is hard for you to communicate by telephone.  Try to learn a listening technique called speechreading. It is not lipreading. You pay attention to people's gestures, expressions, posture, and tone of voice. These clues can help you understand what a person is saying. Face the person you are talking to, and have them face you. Make sure the lighting is good. You need to see the other person's face clearly.  Think about counseling if you need help to adjust to your hearing loss.  When should you call for help?  Watch closely for changes in your health, and be sure to contact your doctor if:    You think your hearing is getting worse.     You have new symptoms, such as dizziness or nausea.   Where can you learn more?  Go to https://www.Hytle.net/patiented  Enter R798 in the search box to learn more about \"Hearing Loss: Care Instructions.\"  Current as of: March 1, 2023               Content Version: 13.7    [id number] [employer].   Care instructions adapted under license by your healthcare professional. If you have questions about a medical condition or this instruction, always ask your healthcare professional. Healthwise, [employer] disclaims any warranty or liability for your use of this information.         "

## 2023-10-11 NOTE — PROGRESS NOTES
Subjective   Rommel is a 68 year old, presenting for the following health issues:  Follow Up        Chief Complaint:     follow up of multiple concerns including poorly controlled Type 2 Diabetes    HPI:   Patient Rommel Bryan is a very pleasant 68 year old male with history of Type 2 Diabetes, chronic obesity who presents to Internal Medicine clinic today for follow up of multiple concerns including poorly controlled Type 2 Diabetes.     Current Medications:     Current Outpatient Medications   Medication Sig Dispense Refill    albuterol (PROAIR HFA, PROVENTIL HFA, VENTOLIN HFA) 108 (90 BASE) MCG/ACT inhaler Inhale 2 puffs into the lungs every 6 hours as needed for shortness of breath / dyspnea 1 Inhaler 1    aspirin 81 MG tablet Take 1 tablet by mouth daily.      atorvastatin (LIPITOR) 40 MG tablet TAKE 1 TABLET BY MOUTH  DAILY 90 tablet 3    blood glucose ([facility]) lancets Test 1xDay 100 each 3    blood glucose (NO BRAND SPECIFIED) test strip Use to test blood sugar 1 times daily or as directed. 100 strip 3    blood glucose (NO BRAND SPECIFIED) test strip Use to test blood sugars bid times daily or as directed 200 strip 3    blood glucose monitoring (NO BRAND SPECIFIED) meter device kit Use to test blood sugar 1 times daily or as directed. 1 kit 3    blood glucose monitoring (NO BRAND SPECIFIED) meter device kit Use to test blood sugar bidtimes daily or as directed. 1 kit 3    glipiZIDE (GLUCOTROL) 5 MG tablet TAKE 1 TABLET BY MOUTH IN THE  MORNING BEFORE BREAKFAST 90 tablet 3    lisinopril (ZESTRIL) 10 MG tablet Take 1 tablet (10 mg) by mouth daily 90 tablet 3    metFORMIN (GLUCOPHAGE) 500 MG tablet Take 1 tablet (500 mg) by mouth 2 times daily (with meals) 180 tablet 3    order for DME Equipment being ordered: CPAP mask only 1 each 0    order for DME Equipment being ordered: CPAP mask   full face mask (CPT code: a7030) and head gear (CPT code: ). 1 each 0         Allergies:    No Known  Allergies         Past Medical History:     Past Medical History:   Diagnosis Date    Asthma     Family history of coronary artery disease 2013    HTN (hypertension)     Hypercholesterolemia     Hyperlipidemia LDL goal <130 2013    Moderate persistent asthma 2013    Obesity, Class III, BMI 40-49.9 (morbid obesity) (H) 2013    LYRIC (obstructive sleep apnea)     Squamous cell carcinoma     Transaminasemia 2013    Type 2 diabetes mellitus (H) 2020         Past Surgical History:     Past Surgical History:   Procedure Laterality Date    COLONOSCOPY  3/11/14    COLONOSCOPY  3/11/2014    Procedure: COLONOSCOPY;  colonoscopy;  Surgeon: Alirio Mao MD;  Location:  GI    REPAIR NERVE PRIMARY PERIPHERAL  2013         Family Medical History:     Family History   Problem Relation Age of Onset    Neurologic Disorder Mother         alzheimer     Diabetes Mother     Hypertension Mother     Cardiovascular Father         chf     Prostate Cancer Maternal Uncle     Asthma No family hx of     Cancer - colorectal No family hx of     Anesthesia Reaction No family hx of     Blood Disease No family hx of     Eye Disorder No family hx of          Social History:     Social History     Socioeconomic History    Marital status:      Spouse name: Not on file    Number of children: Not on file    Years of education: Not on file    Highest education level: Not on file   Occupational History    Occupation:  for [employer]     Employer: [employer]   Tobacco Use    Smoking status: Never    Smokeless tobacco: Never   Substance and Sexual Activity    Alcohol use: Yes     Comment: maybe 1-2  glasses a year  special occasion     Drug use: No    Sexual activity: Not Currently   Other Topics Concern    Parent/sibling w/ CABG, MI or angioplasty before 65F 55M? Not Asked     Service No    Blood Transfusions No    Caffeine Concern Not Asked    Occupational  Exposure Not Asked    Hobby Hazards Not Asked    Sleep Concern Not Asked    Stress Concern Not Asked    Weight Concern Not Asked    Special Diet Not Asked    Back Care Not Asked    Exercise No    Bike Helmet Not Asked    Seat Belt Yes    Self-Exams Not Asked   Social History Narrative    Eats fruits and vegetables every day. He has at least 3 servings of dairy per day. Vitamin D supplement recommended.     Social Determinants of Health     Financial Resource Strain: Unknown (9/27/2023)    Financial Resource Strain     Within the past 12 months, have you or your family members you live with been unable to get utilities (heat, electricity) when it was really needed?: Patient refused   Food Insecurity: Unknown (9/27/2023)    Food Insecurity     Within the past 12 months, did you worry that your food would run out before you got money to buy more?: Patient refused     Within the past 12 months, did the food you bought just not last and you didn t have money to get more?: Patient refused   Transportation Needs: Unknown (9/27/2023)    Transportation Needs     Within the past 12 months, has lack of transportation kept you from medical appointments, getting your medicines, non-medical meetings or appointments, work, or from getting things that you need?: Patient refused   Physical Activity: Not on file   Stress: Not on file   Social Connections: Not on file   Interpersonal Safety: Low Risk  (10/12/2023)    Interpersonal Safety     Do you feel physically and emotionally safe where you currently live?: Yes     Within the past 12 months, have you been hit, slapped, kicked or otherwise physically hurt by someone?: No     Within the past 12 months, have you been humiliated or emotionally abused in other ways by your partner or ex-partner?: No   Housing Stability: Unknown (9/27/2023)    Housing Stability     Do you have housing? : Patient refused     Are you worried about losing your housing?: Patient refused           Review of  "System:     Constitutional: Negative for fever or chills, positive for chronic obesity  Skin: Negative for rashes  Ears/Nose/Throat: Negative for nasal congestion, sore throat  Respiratory: No shortness of breath, dyspnea on exertion, cough, or hemoptysis  Cardiovascular: Negative for chest pain  Gastrointestinal: Negative for nausea, vomiting  Genitourinary: Negative for dysuria, hematuria  Musculoskeletal: Negative for myalgias  Neurologic: Negative for headaches  Psychiatric: Negative for depression, anxiety  Hematologic/Lymphatic/Immunologic: Negative  Endocrine: positive for Type 2 Diabetes   Behavioral: Negative for tobacco use       Physical Exam:   /82 (BP Location: Right arm, Patient Position: Sitting, Cuff Size: Adult Large)   Pulse 79   Temp 98  F (36.7  C) (Oral)   Resp 18   Ht 1.753 m (5' 9\")   SpO2 92%   BMI 42.53 kg/m      GENERAL: alert and no distress  EYES: eyes grossly normal to inspection, and conjunctivae and sclerae normal  HENT: Normocephalic atraumatic. Nose and mouth without ulcers or lesions  NECK: supple  RESP: lungs clear to auscultation   CV: regular rate and rhythm, normal S1 S2  LYMPH: no peripheral edema   ABDOMEN: nondistended  MS: no gross musculoskeletal defects noted  SKIN: no suspicious lesions or rashes  NEURO: Alert & Oriented x 3.   PSYCH: mentation appears normal, affect normal        Diagnostic Test Results:     Diagnostic Test Results:  Labs reviewed in Epic    ASSESSMENT/PLAN:       Rommel was seen today for follow up.    Diagnoses and all orders for this visit:    Type 2 diabetes mellitus without complication, without long-term current use of insulin (H)  -     Adult Eye  Referral; Future  -     Med Therapy Management Referral  -     metFORMIN (GLUCOPHAGE) 500 MG tablet; Take 1 tablet (500 mg) by mouth 2 times daily (with meals)  -     blood glucose monitoring (NO BRAND SPECIFIED) meter device kit; Use to test blood sugar 1 times daily or as " directed.  -     blood glucose (NO BRAND SPECIFIED) test strip; Use to test blood sugar 1 times daily or as directed.  -     blood glucose ([employer] FINEPOINT) lancets; Test 1xDay    Hyperlipidemia LDL goal <130  - stable, continue current therapy    Moderate persistent asthma without complication  - stable, continue current therapy    LYRIC (obstructive sleep apnea)  Obesity, Class III, BMI 40-49.9 (morbid obesity) (H)  - diet, exercise    Other orders  -     Pneumococcal 20 Valent Conjugate (PCV20)            Follow Up Plan:     Patient is instructed to return to Internal Medicine clinic for follow-up visit in 3 months.        Beckie Khan MD  Internal Medicine  [facility]

## 2023-10-12 ENCOUNTER — OFFICE VISIT (OUTPATIENT)
Dept: FAMILY MEDICINE | Facility: CLINIC | Age: 68
End: 2023-10-12
Payer: COMMERCIAL

## 2023-10-12 VITALS
SYSTOLIC BLOOD PRESSURE: 135 MMHG | DIASTOLIC BLOOD PRESSURE: 82 MMHG | BODY MASS INDEX: 42.53 KG/M2 | OXYGEN SATURATION: 92 % | TEMPERATURE: 98 F | HEIGHT: 69 IN | HEART RATE: 79 BPM | RESPIRATION RATE: 18 BRPM

## 2023-10-12 DIAGNOSIS — J45.40 MODERATE PERSISTENT ASTHMA WITHOUT COMPLICATION: ICD-10-CM

## 2023-10-12 DIAGNOSIS — G47.33 OSA (OBSTRUCTIVE SLEEP APNEA): ICD-10-CM

## 2023-10-12 DIAGNOSIS — E11.9 TYPE 2 DIABETES MELLITUS WITHOUT COMPLICATION, WITHOUT LONG-TERM CURRENT USE OF INSULIN (H): Primary | ICD-10-CM

## 2023-10-12 DIAGNOSIS — E78.5 HYPERLIPIDEMIA LDL GOAL <130: ICD-10-CM

## 2023-10-12 DIAGNOSIS — E66.01 OBESITY, CLASS III, BMI 40-49.9 (MORBID OBESITY) (H): ICD-10-CM

## 2023-10-12 PROCEDURE — 90471 IMMUNIZATION ADMIN: CPT | Performed by: INTERNAL MEDICINE

## 2023-10-12 PROCEDURE — 90677 PCV20 VACCINE IM: CPT | Performed by: INTERNAL MEDICINE

## 2023-10-12 PROCEDURE — 99214 OFFICE O/P EST MOD 30 MIN: CPT | Mod: 25 | Performed by: INTERNAL MEDICINE

## 2023-10-12 RX ORDER — BLOOD-GLUCOSE METER
EACH MISCELLANEOUS
Qty: 100 EACH | Refills: 3 | Status: SHIPPED | OUTPATIENT
Start: 2023-10-12 | End: 2024-10-03

## 2023-10-12 ASSESSMENT — PAIN SCALES - GENERAL: PAINLEVEL: NO PAIN (0)

## 2023-10-17 ENCOUNTER — OFFICE VISIT (OUTPATIENT)
Dept: FAMILY MEDICINE | Facility: CLINIC | Age: 68
End: 2023-10-17
Attending: INTERNAL MEDICINE
Payer: COMMERCIAL

## 2023-10-17 DIAGNOSIS — Z85.828 HISTORY OF NONMELANOMA SKIN CANCER: ICD-10-CM

## 2023-10-17 DIAGNOSIS — D18.01 CHERRY ANGIOMA: ICD-10-CM

## 2023-10-17 DIAGNOSIS — L57.8 ACTINIC SKIN DAMAGE: ICD-10-CM

## 2023-10-17 DIAGNOSIS — L91.8 INFLAMED SKIN TAG: ICD-10-CM

## 2023-10-17 DIAGNOSIS — L81.4 LENTIGINES: ICD-10-CM

## 2023-10-17 DIAGNOSIS — D22.9 SKIN MOLE: ICD-10-CM

## 2023-10-17 DIAGNOSIS — L82.1 SEBORRHEIC KERATOSIS: ICD-10-CM

## 2023-10-17 DIAGNOSIS — D49.2 NEOPLASM OF SKIN: ICD-10-CM

## 2023-10-17 DIAGNOSIS — D22.9 MULTIPLE BENIGN NEVI: ICD-10-CM

## 2023-10-17 DIAGNOSIS — L57.0 ACTINIC KERATOSIS: Primary | ICD-10-CM

## 2023-10-17 PROCEDURE — 99204 OFFICE O/P NEW MOD 45 MIN: CPT | Mod: 25 | Performed by: DERMATOLOGY

## 2023-10-17 PROCEDURE — 11102 TANGNTL BX SKIN SINGLE LES: CPT | Performed by: DERMATOLOGY

## 2023-10-17 PROCEDURE — 88305 TISSUE EXAM BY PATHOLOGIST: CPT | Performed by: DERMATOLOGY

## 2023-10-17 PROCEDURE — 11200 RMVL SKIN TAGS UP TO&INC 15: CPT | Mod: XS | Performed by: DERMATOLOGY

## 2023-10-17 PROCEDURE — 17000 DESTRUCT PREMALG LESION: CPT | Mod: XS | Performed by: DERMATOLOGY

## 2023-10-17 RX ORDER — FLUOROURACIL 50 MG/G
CREAM TOPICAL
Qty: 40 G | Refills: 0 | Status: SHIPPED | OUTPATIENT
Start: 2023-10-17 | End: 2023-11-20

## 2023-10-17 RX ORDER — CALCIPOTRIENE 50 UG/G
CREAM TOPICAL
Qty: 60 G | Refills: 1 | Status: SHIPPED | OUTPATIENT
Start: 2023-10-17 | End: 2023-11-20

## 2023-10-17 ASSESSMENT — PAIN SCALES - GENERAL: PAINLEVEL: NO PAIN (0)

## 2023-10-17 NOTE — PROGRESS NOTES
[facility] Dermatology Note  Encounter Date: Oct 17, 2023  Office Visit     Dermatology Problem List:  # Hx of SCC, right ear, s/p Mohs 2019  # AKs.  - 5FU/C planned fall 2023  # Neoplasm of uncertain behavior, L upper back, s/p shave bx 10/1723  ____________________________________________    Assessment & Plan:    # Neoplasm of uncertain behavior, L upper back.  - Shave biopsy performed today (see procedure note(s) below).    # Actinic keratosis, left mid supperior helix  - Cryotherapy performed today (see procedure note(s) below).    # SYmptomatic/inflamed skin tags, L upper eyelid.  - cryo     # Diffuse actinic damage, across the forehead and on vertex scalp  Start Efudex + calcipotriene BID x 4-10 days to forehead/temples and vertex scalp or until skin gets red. Anticipated reaction discussed.    # Benign lesions - SKs, cherry angiomas, lentigenes.  - No treatment required    # Multiple benign nevi.   - Monitor for ABCDEs of melanoma   - Continue sun protection - recommend SPF 30 or higher with frequent application   - Return sooner if noticing changing or symptomatic lesions    # History of NMSC. No evidence of recurrent disease.  - Continue photoprotection - recommend SPF 30 or higher with frequent reapplication  - Continue yearly skin exams  - Advised to monitor for changing, non-healing, bleeding, painful, changing, or otherwise symptomatic lesions      Procedures Performed:   - Cryotherapy procedure note, location(s): left mid superior helix. After verbal consent and discussion of risks and benefits including, but not limited to, dyspigmentation/scar, blister, and pain, 1 AK lesion(s) and 3 inflamed skin tags was(were) treated with 1-2 mm freeze border for 1-2 cycles with liquid nitrogen. Post cryotherapy instructions were provided.    - Shave biopsy procedure note, location(s): left upper back. After discussion of benefits and risks including but not limited to bleeding, infection,  scar, incomplete removal, recurrence, and non-diagnostic biopsy, written consent and photographs were obtained. The area was cleaned with isopropyl alcohol. 0.5mL of 1% lidocaine with epinephrine was injected to obtain adequate anesthesia of lesion(s). Shave biopsy at site(s) performed. Hemostasis was achieved with aluminium chloride. Petrolatum ointment and a sterile dressing were applied. The patient tolerated the procedure and no complications were noted. The patient was provided with verbal and written post care instructions.     Follow-up: 3 months, sooner if concerns.     Staff and Scribe:     I, Marnie Otto, am serving as a scribe to document services personally performed by Dr. Briana Valdivia, based on data collection and the provider's statements to me.     Provider Disclosure:   The documentation recorded by the scribe accurately reflects the services I personally performed and the decisions made by me.    Briana Valdivia MD    Department of Dermatology  [facility]: Phone: [phone], Fax: [phone]  10/22/2023     ____________________________________________    CC: Skin Check (FBSC)    HPI:  Mr. Rommel Bryan is a(n) 68 year old male who presents today as a new patient for FBSE.    Hx of SCC.    Has a bump above previous SCC site. Hard. No bleeding.    Patient is otherwise feeling well, without additional skin concerns.    Labs Reviewed:  N/A    Physical Exam:  Vitals: There were no vitals taken for this visit.  SKIN: Total skin excluding the undergarment areas was performed. The exam included the head/face, neck, both arms, chest, back, abdomen, both legs, digits and/or nails.   - Left mid superior helix, a small scaly papule.  - There are erythematous macules with overyling adherent scale on the diffused across the forehead and vertex scalp.  - eroded pink papule on left upper  back.  - There are fleshy pedunculated papules on left upper eyelid.   -  There are dome shaped bright red papules on the trunk and extremities .   - Multiple regular brown pigmented macules and papules are identified on the trunk and extremities. .   - Scattered brown macules on sun exposed areas.  - Waxy stuck on papules and plaques on trunk and extremities.   - No other lesions of concern on areas examined.     Medications:  Current Outpatient Medications   Medication    albuterol (PROAIR HFA, PROVENTIL HFA, VENTOLIN HFA) 108 (90 BASE) MCG/ACT inhaler    aspirin 81 MG tablet    atorvastatin (LIPITOR) 40 MG tablet    blood glucose (ServiceRelatedCAN FINEPOINT) lancets    blood glucose (NO BRAND SPECIFIED) test strip    blood glucose (NO BRAND SPECIFIED) test strip    blood glucose monitoring (NO BRAND SPECIFIED) meter device kit    blood glucose monitoring (NO BRAND SPECIFIED) meter device kit    glipiZIDE (GLUCOTROL) 5 MG tablet    lisinopril (ZESTRIL) 10 MG tablet    metFORMIN (GLUCOPHAGE) 500 MG tablet    order for DME    order for DME     No current facility-administered medications for this visit.      Past Medical History:   Patient Active Problem List   Diagnosis    LYRIC (obstructive sleep apnea)    Advanced directives, counseling/discussion    Hyperlipidemia LDL goal <130    Family history of coronary artery disease    Obesity, Class III, BMI 40-49.9 (morbid obesity) (H)    Transaminasemia    Moderate persistent asthma without complication    Hyperglycemia    Type 2 diabetes mellitus without complication, without long-term current use of insulin (H)    History of squamous cell carcinoma of skin     Past Medical History:   Diagnosis Date    Asthma     Family history of coronary artery disease 01/04/2013    HTN (hypertension)     Hypercholesterolemia     Hyperlipidemia LDL goal <130 01/04/2013    Moderate persistent asthma 01/04/2013    Obesity, Class III, BMI 40-49.9 (morbid obesity) (H) 01/04/2013    LYRIC  (obstructive sleep apnea)     Squamous cell carcinoma     Transaminasemia 01/04/2013    Type 2 diabetes mellitus (H) 03/2020        CC Beckie Khan MD  [address] [place] [address],  [place] [address] on close of this encounter.

## 2023-10-17 NOTE — PATIENT INSTRUCTIONS
For forehead/temples and vertex scalp, these are low-risk precancers called actinic keratoses. We will treat them with an anti-cancer cream.  Please start Efudex and calcipotriene mixed equally together. Apply twice daily to above areas for 4-10 days or until skin gets red. Stop applying when skin gets red.  Skin will get red and crusty (like hamburger).  Please keep Efudex away from pets and children. Wash hands thoroughly after application.   See handout below for more information about Efudex.    If medications are more than ~$75, please contact me for a compounded version through [facility].  Alternatively can also use Efudex alone. If you want to use Efudex alone, apply twice daily for 3-4 weeks. Stop when significant irritation occurs.        Efudex Treatment  Today, you are being prescribed Fluorouracil (Efudex) a topical cream used for the treatment of Actinic Keratosis (AKs).  The medication is working to eliminate the unhealthy cells.  This treatment may be unattractive and somewhat uncomfortable.  You may experience some mild discomfort while being treated.  You will want to stop any other creams such as glycolic acid products, retin A, Tazorac, etc. to the area. You may use bland makeup/cover-up as long as it doesn't sting or cause you discomfort.  Apply the cream at night as your physician recommends. Use a cotton-tipped applicator, or use gloves if applying it with your fingertips. If applied with unprotected fingertips, it is important to wash your hands well after you apply this medicine.   Keep this medication away from pets.  We recommend avoiding excessive sun exposure to the treated area  You may use moisturizing creams over bothersome areas such as Vanicream or Cetaphil cream if the reaction becomes too bothersome. Please, call the clinic if this occurs.   Potential Side Effects  Your treated areas may be unsightly during therapy.  This will improve slowly following the discontinuation of  therapy.   During the first week of application, mild inflammation may occur.   During the following weeks, redness, and swelling may occur with some crusting and burning.   Lesions resolve as the skin exfoliates.   Over 1 to 2 weeks, new skin grows into the treatment area.  Keep this medication away from pets  Specific side effects that usually do not require medical attention (report to your doctor or health care professional if they continue or are bothersome) include:  Red or dark-colored skin   Mild erosion (loss of upper layer of skin)   Mild eye irritation including burning, itching, sensitivity, stinging, or watering   Increased sensitivity of the skin to sun and ultraviolet light   Pain and burning of the affected area   Dryness, scaling or swelling of the affected area   Skin rash, itching of the affected area   Tenderness   If you have severe pain, please, call the clinic immediately and indicate that you have pain.  Ask for a call from the RN.     Who should I call with questions?  [facility]: [phone]  [facility]: [phone]  For urgent needs outside of business hours call the [facility] at [phone] and ask for the dermatology resident on call           Patient Education       Proper skin care from [facility]:    -Eliminate harsh soaps as they strip the natural oils from the skin, often resulting in dry itchy skin ( i.e. Dial, Zest, Yumiko Spring)  -Use mild soaps such as Cetaphil or Dove Sensitive Skin in the shower. You do not need to use soap on arms, legs, and trunk every time you shower unless visibly soiled.   -Avoid hot or cold showers.  -After showering, lightly dry off and apply moisturizing within 2-3 minutes. This will help trap moisture in the skin.   -Aggressive use of a moisturizer at least 1-2 times a day to the entire body (including -Vanicream, Cetaphil, Aquaphor or Cerave) and moisturize hands  after every washing.  -We recommend using moisturizers that come in a tub that needs to be scooped out, not a pump. This has more of an oil base. It will hold moisture in your skin much better than a water base moisturizer. The above recommended are non-pore clogging.      Wear a sunscreen with at least SPF 30 on your face, ears, neck and V of the chest daily. Wear sunscreen on other areas of the body if those areas are exposed to the sun throughout the day. Sunscreens can contain physical and/or chemical blockers. Physical blockers are less likely to clog pores, these include zinc oxide and titanium dioxide. Reapply every two hour and after swimming.     Sunscreen examples: https://www.ewg.org/sunscreen/    UV radiation  UVA radiation remains constant throughout the day and throughout the year. It is a longer wavelength than UVB and therefore penetrates deeper into the skin leading to immediate and delayed tanning, photoaging, and skin cancer. 70-80% of UVA and UVB radiation occurs between the hours of 10am-2pm.  UVB radiation  UVB radiation causes the most harmful effects and is more significant during the summer months. However, snow and ice can reflect UVB radiation leading to skin damage during the winter months as well. UVB radiation is responsible for tanning, burning, inflammation, delayed erythema (pinkness), pigmentation (brown spots), and skin cancer.     I recommend self monthly full body exams and yearly full body exams with a dermatology provider. If you develop a new or changing lesion please follow up for examination. Most skin cancers are pink and scaly or pink and pearly. However, we do see blue/brown/black skin cancers.  Consider the ABCDEs of melanoma when giving yourself your monthly full body exam ( don't forget the groin, buttocks, feet, toes, etc). A-asymmetry, B-borders, C-color, D-diameter, E-elevation or evolving. If you see any of these changes please follow up in clinic. If you cannot  see your back I recommend purchasing a hand held mirror to use with a larger wall mirror.       Checking for Skin Cancer  You can find cancer early by checking your skin each month. There are 3 kinds of skin cancer. They are melanoma, basal cell carcinoma, and squamous cell carcinoma. Doing monthly skin checks is the best way to find new marks or skin changes. Follow the instructions below for checking your skin.   The ABCDEs of checking moles for melanoma   Check your moles or growths for signs of melanoma using ABCDE:   Asymmetry: the sides of the mole or growth don t match  Border: the edges are ragged, notched, or blurred  Color: the color within the mole or growth varies  Diameter: the mole or growth is larger than 6 mm (size of a pencil eraser)  Evolving: the size, shape, or color of the mole or growth is changing (evolving is not shown in the images below)    Checking for other types of skin cancer  Basal cell carcinoma or squamous cell carcinoma have symptoms such as:     A spot or mole that looks different from all other marks on your skin  Changes in how an area feels, such as itching, tenderness, or pain  Changes in the skin's surface, such as oozing, bleeding, or scaliness  A sore that does not heal  New swelling or redness beyond the border of a mole    Who s at risk?  Anyone can get skin cancer. But you are at greater risk if you have:   Fair skin, light-colored hair, or light-colored eyes  Many moles or abnormal moles on your skin  A history of sunburns from sunlight or tanning beds  A family history of skin cancer  A history of exposure to radiation or chemicals  A weakened immune system  If you have had skin cancer in the past, you are at risk for recurring skin cancer.   How to check your skin  Do your monthly skin checkups in front of a full-length mirror. Check all parts of your body, including your:   Head (ears, face, neck, and scalp)  Torso (front, back, and sides)  Arms (tops, undersides,  upper, and lower armpits)  Hands (palms, backs, and fingers, including under the nails)  Buttocks and genitals  Legs (front, back, and sides)  Feet (tops, soles, toes, including under the nails, and between toes)  If you have a lot of moles, take digital photos of them each month. Make sure to take photos both up close and from a distance. These can help you see if any moles change over time.   Most skin changes are not cancer. But if you see any changes in your skin, call your doctor right away. Only he or she can diagnose a problem. If you have skin cancer, seeing your doctor can be the first step toward getting the treatment that could save your life.   [employer] last reviewed this educational content on 4/1/2019 2000-2020 [employer]. [address] [place] [address]. All rights reserved. This information is not intended as a substitute for professional medical care. Always follow your healthcare professional's instructions.       When should I call my doctor?  If you are worsening or not improving, please, contact us or seek urgent care as noted below.     Who should I call with questions (adults)?  [facility] (adult and pediatric): [phone]  [facility] (adult): [phone]  [facility] ([facility] and [place]) [phone]  For urgent needs outside of business hours call the [facility] at [phone] and ask for the dermatology resident on call to be paged  If this is a medical emergency and you are unable to reach an ER, Call [phone]      If you need a prescription refill, please contact your pharmacy. Refills are approved or denied by our Physicians during normal business hours, Monday through Fridays  Per office policy, refills will not be granted if you have not been seen within the past year (or sooner depending on your child's condition)

## 2023-10-17 NOTE — LETTER
10/17/2023         RE: Rommel Bryan  [address] [address] [address]        Dear Colleague,    Thank you for referring your patient, Rommel Bryan, to the [facility]. Please see a copy of my visit note below.    [facility] Dermatology Note  Encounter Date: Oct 17, 2023  Office Visit     Dermatology Problem List:  # Hx of SCC, right ear, s/p Mohs 2019  # AKs.  - 5FU/C planned fall 2023  # Neoplasm of uncertain behavior, L upper back, s/p shave bx 10/1723  ____________________________________________    Assessment & Plan:    # Neoplasm of uncertain behavior, L upper back.  - Shave biopsy performed today (see procedure note(s) below).    # Actinic keratosis, left mid supperior helix  - Cryotherapy performed today (see procedure note(s) below).    # SYmptomatic/inflamed skin tags, L upper eyelid.  - cryo     # Diffuse actinic damage, across the forehead and on vertex scalp  Start Efudex + calcipotriene BID x 4-10 days to forehead/temples and vertex scalp or until skin gets red. Anticipated reaction discussed.    # Benign lesions - SKs, cherry angiomas, lentigenes.  - No treatment required    # Multiple benign nevi.   - Monitor for ABCDEs of melanoma   - Continue sun protection - recommend SPF 30 or higher with frequent application   - Return sooner if noticing changing or symptomatic lesions    # History of NMSC. No evidence of recurrent disease.  - Continue photoprotection - recommend SPF 30 or higher with frequent reapplication  - Continue yearly skin exams  - Advised to monitor for changing, non-healing, bleeding, painful, changing, or otherwise symptomatic lesions      Procedures Performed:   - Cryotherapy procedure note, location(s): left mid superior helix. After verbal consent and discussion of risks and benefits including, but not limited to, dyspigmentation/scar, blister, and pain, 1 AK lesion(s) and 3 inflamed skin tags was(were)  treated with 1-2 mm freeze border for 1-2 cycles with liquid nitrogen. Post cryotherapy instructions were provided.    - Shave biopsy procedure note, location(s): left upper back. After discussion of benefits and risks including but not limited to bleeding, infection, scar, incomplete removal, recurrence, and non-diagnostic biopsy, written consent and photographs were obtained. The area was cleaned with isopropyl alcohol. 0.5mL of 1% lidocaine with epinephrine was injected to obtain adequate anesthesia of lesion(s). Shave biopsy at site(s) performed. Hemostasis was achieved with aluminium chloride. Petrolatum ointment and a sterile dressing were applied. The patient tolerated the procedure and no complications were noted. The patient was provided with verbal and written post care instructions.     Follow-up: 3 months, sooner if concerns.     Staff and Scribe:     I, Marnie Otto, am serving as a scribe to document services personally performed by Dr. Briana Valdivia, based on data collection and the provider's statements to me.     Provider Disclosure:   The documentation recorded by the scribe accurately reflects the services I personally performed and the decisions made by me.    Briana Valdivia MD    Department of Dermatology  [facility]: Phone: [phone], Fax: [phone]  10/22/2023     ____________________________________________    CC: Skin Check (FBSC)    HPI:  Mr. Rommel Bryan is a(n) 68 year old male who presents today as a new patient for FBSE.    Hx of SCC.    Has a bump above previous SCC site. Hard. No bleeding.    Patient is otherwise feeling well, without additional skin concerns.    Labs Reviewed:  N/A    Physical Exam:  Vitals: There were no vitals taken for this visit.  SKIN: Total skin excluding the undergarment areas was performed. The exam included the head/face, neck, both  arms, chest, back, abdomen, both legs, digits and/or nails.   - Left mid superior helix, a small scaly papule.  - There are erythematous macules with overyling adherent scale on the diffused across the forehead and vertex scalp.  - eroded pink papule on left upper back.  - There are fleshy pedunculated papules on left upper eyelid.   -  There are dome shaped bright red papules on the trunk and extremities .   - Multiple regular brown pigmented macules and papules are identified on the trunk and extremities. .   - Scattered brown macules on sun exposed areas.  - Waxy stuck on papules and plaques on trunk and extremities.   - No other lesions of concern on areas examined.     Medications:  Current Outpatient Medications   Medication     albuterol (PROAIR HFA, PROVENTIL HFA, VENTOLIN HFA) 108 (90 BASE) MCG/ACT inhaler     aspirin 81 MG tablet     atorvastatin (LIPITOR) 40 MG tablet     blood glucose ([employer]/Bud[employer]CAN FINEPOINT) lancets     blood glucose (NO BRAND SPECIFIED) test strip     blood glucose (NO BRAND SPECIFIED) test strip     blood glucose monitoring (NO BRAND SPECIFIED) meter device kit     blood glucose monitoring (NO BRAND SPECIFIED) meter device kit     glipiZIDE (GLUCOTROL) 5 MG tablet     lisinopril (ZESTRIL) 10 MG tablet     metFORMIN (GLUCOPHAGE) 500 MG tablet     order for DME     order for DME     No current facility-administered medications for this visit.      Past Medical History:   Patient Active Problem List   Diagnosis     LYRIC (obstructive sleep apnea)     Advanced directives, counseling/discussion     Hyperlipidemia LDL goal <130     Family history of coronary artery disease     Obesity, Class III, BMI 40-49.9 (morbid obesity) (H)     Transaminasemia     Moderate persistent asthma without complication     Hyperglycemia     Type 2 diabetes mellitus without complication, without long-term current use of insulin (H)     History of squamous cell carcinoma of skin     Past Medical History:   Diagnosis  Date     Asthma      Family history of coronary artery disease 01/04/2013     HTN (hypertension)      Hypercholesterolemia      Hyperlipidemia LDL goal <130 01/04/2013     Moderate persistent asthma 01/04/2013     Obesity, Class III, BMI 40-49.9 (morbid obesity) (H) 01/04/2013     LYRIC (obstructive sleep apnea)      Squamous cell carcinoma      Transaminasemia 01/04/2013     Type 2 diabetes mellitus (H) 03/2020        CC Beckie Khan MD  [address] [address] on close of this encounter.      Again, thank you for allowing me to participate in the care of your patient.        Sincerely,        Briana Valdivia MD

## 2023-10-19 LAB
PATH REPORT.COMMENTS IMP SPEC: NORMAL
PATH REPORT.FINAL DX SPEC: NORMAL
PATH REPORT.GROSS SPEC: NORMAL
PATH REPORT.MICROSCOPIC SPEC OTHER STN: NORMAL
PATH REPORT.RELEVANT HX SPEC: NORMAL

## 2023-10-26 ENCOUNTER — TELEPHONE (OUTPATIENT)
Dept: FAMILY MEDICINE | Facility: CLINIC | Age: 68
End: 2023-10-26
Payer: COMMERCIAL

## 2023-10-26 NOTE — LETTER
October 26, 2023      Rommel Bryan  [address] [facility] [facility] [address]            Dear ,    Great news! We are writing to inform you of your biopsy results that showed a normal benign thickening of the skin. No signs of skin cancer and no further treatment is required. I will also give the caveat that since I only took a small piece of the spot, we should watch this area carefully to make sure it doesn't grow back or do anything funny. If it grows, changes, or gives you symptoms such as pain or bleeding, please let me know and we can re-evaluate.     It was great to see you and please let me know if any questions or issues.    If you have any questions or concerns, please call the clinic at the number listed above.       Sincerely,     Briana Valdivia MD    Department of Dermatology  [facility]

## 2023-10-26 NOTE — TELEPHONE ENCOUNTER
Letter mailed home about biopsy results and Dr. Valdivia's message:  Great news! We are writing to inform you of your biopsy results that showed a normal benign thickening of the skin. No signs of skin cancer and no further treatment is required. I will also give the caveat that since I only took a small piece of the spot, we should watch this area carefully to make sure it doesn't grow back or do anything funny. If it grows, changes, or gives you symptoms such as pain or bleeding, please let me know and we can re-evaluate.     It was great to see you and please let me know if any questions or issues.     Sincerely,     Briana Valdivia MD    Department of Dermatology  [facility]    Monique LEGGETT RN  [facility] [place]  [phone]

## 2023-11-20 ENCOUNTER — OFFICE VISIT (OUTPATIENT)
Dept: PHARMACY | Facility: CLINIC | Age: 68
End: 2023-11-20
Attending: INTERNAL MEDICINE
Payer: COMMERCIAL

## 2023-11-20 VITALS — BODY MASS INDEX: 41.35 KG/M2 | SYSTOLIC BLOOD PRESSURE: 134 MMHG | DIASTOLIC BLOOD PRESSURE: 76 MMHG | WEIGHT: 280 LBS

## 2023-11-20 DIAGNOSIS — L40.9 PSORIASIS: ICD-10-CM

## 2023-11-20 DIAGNOSIS — E78.5 HYPERLIPIDEMIA LDL GOAL <130: ICD-10-CM

## 2023-11-20 DIAGNOSIS — E11.9 TYPE 2 DIABETES MELLITUS WITHOUT COMPLICATION, WITHOUT LONG-TERM CURRENT USE OF INSULIN (H): Primary | ICD-10-CM

## 2023-11-20 DIAGNOSIS — J45.20 MILD INTERMITTENT ASTHMA WITHOUT COMPLICATION: ICD-10-CM

## 2023-11-20 PROCEDURE — 99207 PR NO CHARGE LOS: CPT

## 2023-11-20 NOTE — PROGRESS NOTES
Medication Therapy Management (MTM) Encounter    ASSESSMENT:                            Medication Adherence/Access: No issues identified    Diabetes:   Discussed nutrition and medication interventions today. Patient desires to continue lifestyle modification before escalating pharmacotherapy. Patient's CGM shows that he is achieving time in range >70% over the last 14 days. Future consideration for optimizing metformin, glipizide, and potentially adding a GLP-1. Patient is only taking aspirin for headaches, consider cardiac benefit of taking every day.    Hyperlipidemia:   Stable.    Psoriasis/Derm:  Stable     Asthma:   Stable    PLAN:                            Continue your lifestyle changes. Keep watching how many carbs you eat, continue to exercise and slowly increase your exercise tolerance.   I will MyChart some information about MyPlate that has some recipes for you to try   Continue to use the nutrition and support staff you get from the CGM       Follow-up: After A1c results at the end of December.     SUBJECTIVE/OBJECTIVE:                          Rommel Bryan is a 68 year old male coming in for an initial visit. He was referred to me from Dr. Khan.      Reason for visit: Medication Review, Diabetes.    Allergies/ADRs: Reviewed in chart  Past Medical History: Reviewed in chart  Tobacco: He reports that he has never smoked. He has never used smokeless tobacco.  Alcohol: 1-3 drinks per year  Other Substance Use: None  Caffeine: Occasionally sparkling water with caffeine if he needs to be alert for an on-call work call.    Medication Adherence/Access: no issues reported    Diabetes   Metformin 500 mg twice daily- Patient has been taking 3 times a daily (total of 1500 mg)- morning, dinner, and at night before bed   Glipizide 5 mg daily   Aspirin 81mg daily- only takes as needed for headaches  Lisinopril 10 mg daily- no side effects reported  Patient is not experiencing side effects.    Blood sugar  monitoring: Continuous Glucose Monitor Freestyle Susu 2 with a plan to move the Frestyle 3 when it becomes available through his work.  He notices sugars will go down in the morning after he takes his morning medications. Overall, his blood sugars are within range  mg/dL with occasional highs >200 mg/dL. He is in target range 96% of the time in the last 14 days.     Current diabetes symptoms: none  Diet/Exercise: Notices that carbs spike his blood sugar, If he exercises after eating- he notices that his blood sugars will come down.   Breakfast has eggs and sausage, switching over to a lower carb diet, cheese as a snack,  Lunch has been making his own salads, eggs, chili using turkey meat instead of beef,   Dinner eats more fish, salmon, and cod, and salad.   Sometimes has cravings and will have a peanut butter sandwich   Eye exam in the last 12 months? No  Foot exam: due  Urine Albumin:   Lab Results   Component Value Date    UMALCR  09/28/2023      Comment:      Unable to calculate, urine albumin and/or urine creatinine is outside detectable limits.  Microalbuminuria is defined as an albumin:creatinine ratio of 17 to 299 for males and 25 to 299 for females. A ratio of albumin:creatinine of 300 or higher is indicative of overt proteinuria.  Due to biologic variability, positive results should be confirmed by a second, first-morning random or 24-hour timed urine specimen. If there is discrepancy, a third specimen is recommended. When 2 out of 3 results are in the microalbuminuria range, this is evidence for incipient nephropathy and warrants increased efforts at glucose control, blood pressure control, and institution of therapy with an angiotensin-converting-enzyme (ACE) inhibitor (if the patient can tolerate it).        Lab Results   Component Value Date    A1C 10.7 (H) 09/28/2023     Hyperlipidemia   atorvastatin 40mg daily  Patient reports no significant myalgias or other side effects.  The 10-year ASCVD  "risk score (Barak MACIAS, et al., 2019) is: 30.7%    Values used to calculate the score:      Age: 68 years      Sex: Male      Is Non- : No      Diabetic: Yes      Tobacco smoker: No      Systolic Blood Pressure: 134 mmHg      Is BP treated: No      HDL Cholesterol: 43 mg/dL      Total Cholesterol: 181 mg/dL       Recent Labs   Lab Test 09/28/23  0923 10/18/22  0739   CHOL 181 154   HDL 43 44   * 85   TRIG 161* 123     Psoriasis/Derm:  Dovonox (calcipotriene) 0.005% cream  Efudex (fluorouracil) 5% cream  10-day course completed without issues. Did experience peeling on the face as he expected, slightly more peeling on the left side.  Has upcoming follow-up scheduled with dermatology.    Asthma:   Flovent diskus 250 mcg- only using it on occasion and was using at night cause he was making \"squeaking sounds \" because of dust and pollen in the air. Using twice a day when needed     Today's Vitals: /78   Wt 280 lb (127 kg)   BMI 41.35 kg/m    ----------------    I spent 60 minutes with this patient today.  A copy of the visit note was provided to the patient's provider(s).    A summary of these recommendations was sent via [employer].    Dianne Huang PharmD  Medication Therapy Management (MTM) Resident  Pager: [phone]    Pamela Fox PharmD, [facility]  Medication Therapy Management Provider  [phone]     Medication Therapy Recommendations  Type 2 diabetes mellitus without complication, without long-term current use of insulin (H)    Rationale: Dose too low - Dosage too low - Effectiveness   Recommendation: Increase Dose - metFORMIN 500 MG tablet   Status: Accepted per CPA            "

## 2023-11-20 NOTE — PATIENT INSTRUCTIONS
"Recommendations from today's MTM visit:                                                    Continue your lifestyle changes. Keep watching how many carbs you eat, continue to exercise and slowly increase your exercise tolerance.   I will MyChart some information about MyPlate that has some recipes for you to try   Continue to use the nutrition and support staff you get from the CGM     Follow-up: After A1c results at the end of December.     It was great speaking with you today.  I value your experience and would be very thankful for your time in providing feedback in our clinic survey. In the next few days, you may receive an email or text message from [employer] with a link to a survey related to your  clinical pharmacist.\"     To schedule another MTM appointment, please call the clinic directly or you may call the MTM scheduling line at [phone] or toll-free at [phone].     My Clinical Pharmacist's contact information:                                                      Please feel free to contact me with any questions or concerns you have.      Dianne Huang PharmD  Medication Therapy Management (MTM) Resident  Pager: [phone]    Pamela Fox PharmD, [facility]  Medication Therapy Management Provider  [phone]     "

## 2023-11-21 NOTE — PROGRESS NOTES
I have read the below correspondence and agree with the plan.  Pamela Fox, PharmD, [facility]  Medication Therapy Management Provider  [phone]       Beckie Khan MD Smith, Mackenzie B, RPH  Agree with plan for Metformin 500 mg three times (1500 mg) daily    Thanks,    Dr. Khan          Previous Messages       ----- Message -----  From: Dianne Huang RPH  Sent: 11/20/2023   4:37 PM CST  To: Beckie Khan MD  Subject: Metformin Dose Increase                          Hello Pamela Chavis and I saw Rommel today. He started taking Metformin 500 mg three times (1500 mg) daily on his own, and agree that with his A1c, his dose could be optimized. I wanted to reach out to see if you would like me to update his Metformin prescription to how he is taking it, three times daily?    Thank you,    Dianne Huang RPH on 11/20/2023 at 4:36 PM

## 2023-12-08 DIAGNOSIS — E78.5 HYPERLIPIDEMIA LDL GOAL <130: ICD-10-CM

## 2023-12-11 RX ORDER — ATORVASTATIN CALCIUM 40 MG/1
TABLET, FILM COATED ORAL
Qty: 90 TABLET | Refills: 1 | Status: SHIPPED | OUTPATIENT
Start: 2023-12-11 | End: 2024-04-08

## 2024-01-10 DIAGNOSIS — E11.9 TYPE 2 DIABETES MELLITUS WITHOUT COMPLICATION, WITHOUT LONG-TERM CURRENT USE OF INSULIN (H): ICD-10-CM

## 2024-01-11 RX ORDER — GLIPIZIDE 5 MG/1
TABLET ORAL
Qty: 90 TABLET | Refills: 1 | Status: SHIPPED | OUTPATIENT
Start: 2024-01-11 | End: 2024-04-08

## 2024-01-30 ENCOUNTER — OFFICE VISIT (OUTPATIENT)
Dept: FAMILY MEDICINE | Facility: CLINIC | Age: 69
End: 2024-01-30
Payer: COMMERCIAL

## 2024-01-30 DIAGNOSIS — L57.0 ACTINIC KERATOSIS: Primary | ICD-10-CM

## 2024-01-30 DIAGNOSIS — L82.0 INFLAMED SEBORRHEIC KERATOSIS: ICD-10-CM

## 2024-01-30 PROCEDURE — 17003 DESTRUCT PREMALG LES 2-14: CPT | Mod: XS | Performed by: DERMATOLOGY

## 2024-01-30 PROCEDURE — 17000 DESTRUCT PREMALG LESION: CPT | Mod: XS | Performed by: DERMATOLOGY

## 2024-01-30 PROCEDURE — 17110 DESTRUCTION B9 LES UP TO 14: CPT | Performed by: DERMATOLOGY

## 2024-01-30 NOTE — LETTER
1/30/2024         RE: Rommel Bryan  [address] [place] [address]        Dear Colleague,    Thank you for referring your patient, Rommel Bryan, to the [facility]. Please see a copy of my visit note below.    [facility] Dermatology Note  Encounter Date: Jan 30, 2024  Office Visit     Dermatology Problem List:  1. Hx of SCC, right ear, s/p Mohs 2019  2. AK's  - S/p 5FU/C fall 2023  - R Forehead and vertex scalp, s/p cryo 01/30/24  3. SK, L upper back, s/p shave bx 10/1723  3. ISK, right upper eyelid, s/p cryo 01/30/24  ____________________________________________    Assessment & Plan:    # Actinic keratosis, R forehead and vertex scalp  - had great response to field therapy, will cryo residual lesions today  - Cryotherapy performed today (see procedure note(s) below).    # Seborrheic keratosis, inflamed, Right upper eyelid   - Cryotherapy performed today (see procedure note(s) below).    Procedures Performed:   - Cryotherapy procedure note, location(s): see above. After verbal consent and discussion of risks and benefits including, but not limited to, dyspigmentation/scar, blister, and pain, 2 AK's and 1 ISK lesion(s) was(were) treated with 1-2 mm freeze border for 1-2 cycles with liquid nitrogen. Post cryotherapy instructions were provided.      Follow-up: 9 month(s) in-person for FBSE, or earlier for new or changing lesions    Staff and Scribe:     Scribe Disclosure:   I, TOM COX, am serving as a scribe; to document services personally performed by Briana Valdivia MD -based on data collection and the provider's statements to me.    Provider Disclosure:   The documentation recorded by the scribe accurately reflects the services I personally performed and the decisions made by me.    Briana Valdivia MD    Department of Dermatology  [facility]  Surgery Center: Phone: [phone], Fax: [phone]  2/4/2024     ____________________________________________    CC: Derm Problem (3 month follow up  on face and scalp)    HPI:  Mr. Rommel Bryan is a(n) 69 year old male who presents today as a return patient for recheck after field therapy.    The patient presents for a 3 month follow up.  Got red with creams     Patient is otherwise feeling well, without additional skin concerns.    Labs Reviewed:  Pathology 10/17/23:  A(1). L upper back:  - Seborrheic keratosis, inflamed and traumatized, base transected    Physical Exam:  Vitals: There were no vitals taken for this visit.  SKIN: Total skin excluding the undergarment areas was performed. The exam included the head/face, neck, both arms, chest, back, abdomen, both legs, digits and/or nails.   - There are erythematous macules with overyling adherent scale on the R forehead and vertex scalp.  - There is a tan to brown waxy stuck on papule with surrounding erythema on the right upper eyelid.  - No other lesions of concern on areas examined.     Medications:  Current Outpatient Medications   Medication     aspirin 81 MG tablet     atorvastatin (LIPITOR) 40 MG tablet     blood glucose (LIFESCAN FINEPOINT) lancets     blood glucose (NO BRAND SPECIFIED) test strip     blood glucose monitoring (NO BRAND SPECIFIED) meter device kit     fluticasone (FLOVENT DISKUS) 250 MCG/ACT inhaler     glipiZIDE (GLUCOTROL) 5 MG tablet     lisinopril (ZESTRIL) 10 MG tablet     metFORMIN (GLUCOPHAGE) 500 MG tablet     order for DME     order for DME     No current facility-administered medications for this visit.      Past Medical History:   Patient Active Problem List   Diagnosis     LYRIC (obstructive sleep apnea)     Advanced directives, counseling/discussion     Hyperlipidemia LDL goal <130     Family history of coronary artery disease     Obesity, Class III, BMI 40-49.9 (morbid obesity) (H)     Transaminasemia     Moderate  persistent asthma without complication     Hyperglycemia     Type 2 diabetes mellitus without complication, without long-term current use of insulin (H)     History of squamous cell carcinoma of skin     Past Medical History:   Diagnosis Date     Asthma      Family history of coronary artery disease 01/04/2013     HTN (hypertension)      Hypercholesterolemia      Hyperlipidemia LDL goal <130 01/04/2013     Moderate persistent asthma 01/04/2013     Obesity, Class III, BMI 40-49.9 (morbid obesity) (H) 01/04/2013     LYRIC (obstructive sleep apnea)      Squamous cell carcinoma      Transaminasemia 01/04/2013     Type 2 diabetes mellitus (H) 03/2020        CC No referring provider defined for this encounter. on close of this encounter.      Again, thank you for allowing me to participate in the care of your patient.        Sincerely,        Briana Valdivia MD

## 2024-01-30 NOTE — PROGRESS NOTES
[facility] Dermatology Note  Encounter Date: Jan 30, 2024  Office Visit     Dermatology Problem List:  1. Hx of SCC, right ear, s/p Mohs 2019  2. AK's  - S/p 5FU/C fall 2023  - R Forehead and vertex scalp, s/p cryo 01/30/24  3. SK, L upper back, s/p shave bx 10/1723  3. ISK, right upper eyelid, s/p cryo 01/30/24  ____________________________________________    Assessment & Plan:    # Actinic keratosis, R forehead and vertex scalp  - had great response to field therapy, will cryo residual lesions today  - Cryotherapy performed today (see procedure note(s) below).    # Seborrheic keratosis, inflamed, Right upper eyelid   - Cryotherapy performed today (see procedure note(s) below).    Procedures Performed:   - Cryotherapy procedure note, location(s): see above. After verbal consent and discussion of risks and benefits including, but not limited to, dyspigmentation/scar, blister, and pain, 2 AK's and 1 ISK lesion(s) was(were) treated with 1-2 mm freeze border for 1-2 cycles with liquid nitrogen. Post cryotherapy instructions were provided.      Follow-up: 9 month(s) in-person for FBSE, or earlier for new or changing lesions    Staff and Scribe:     Scribe Disclosure:   I, TOM COX, am serving as a scribe; to document services personally performed by Briana Valdivia MD -based on data collection and the provider's statements to me.    Provider Disclosure:   The documentation recorded by the scribe accurately reflects the services I personally performed and the decisions made by me.    Briana Valdivia MD    Department of Dermatology  [facility]: Phone: [phone], Fax: [phone]  2/4/2024     ____________________________________________    CC: Derm Problem (3 month follow up  on face and scalp)    HPI:  Mr. Rommel Bryan is a(n) 69 year old male who presents today as a return patient  for recheck after field therapy.    The patient presents for a 3 month follow up.  Got red with creams     Patient is otherwise feeling well, without additional skin concerns.    Labs Reviewed:  Pathology 10/17/23:  A(1). L upper back:  - Seborrheic keratosis, inflamed and traumatized, base transected    Physical Exam:  Vitals: There were no vitals taken for this visit.  SKIN: Total skin excluding the undergarment areas was performed. The exam included the head/face, neck, both arms, chest, back, abdomen, both legs, digits and/or nails.   - There are erythematous macules with overyling adherent scale on the R forehead and vertex scalp.  - There is a tan to brown waxy stuck on papule with surrounding erythema on the right upper eyelid.  - No other lesions of concern on areas examined.     Medications:  Current Outpatient Medications   Medication    aspirin 81 MG tablet    atorvastatin (LIPITOR) 40 MG tablet    blood glucose (LIFESCAN FINEPOINT) lancets    blood glucose (NO BRAND SPECIFIED) test strip    blood glucose monitoring (NO BRAND SPECIFIED) meter device kit    fluticasone (FLOVENT DISKUS) 250 MCG/ACT inhaler    glipiZIDE (GLUCOTROL) 5 MG tablet    lisinopril (ZESTRIL) 10 MG tablet    metFORMIN (GLUCOPHAGE) 500 MG tablet    order for DME    order for DME     No current facility-administered medications for this visit.      Past Medical History:   Patient Active Problem List   Diagnosis    LYRIC (obstructive sleep apnea)    Advanced directives, counseling/discussion    Hyperlipidemia LDL goal <130    Family history of coronary artery disease    Obesity, Class III, BMI 40-49.9 (morbid obesity) (H)    Transaminasemia    Moderate persistent asthma without complication    Hyperglycemia    Type 2 diabetes mellitus without complication, without long-term current use of insulin (H)    History of squamous cell carcinoma of skin     Past Medical History:   Diagnosis Date    Asthma     Family history of coronary artery  disease 01/04/2013    HTN (hypertension)     Hypercholesterolemia     Hyperlipidemia LDL goal <130 01/04/2013    Moderate persistent asthma 01/04/2013    Obesity, Class III, BMI 40-49.9 (morbid obesity) (H) 01/04/2013    LYRIC (obstructive sleep apnea)     Squamous cell carcinoma     Transaminasemia 01/04/2013    Type 2 diabetes mellitus (H) 03/2020        CC No referring provider defined for this encounter. on close of this encounter.

## 2024-04-06 ENCOUNTER — HEALTH MAINTENANCE LETTER (OUTPATIENT)
Age: 69
End: 2024-04-06

## 2024-04-08 DIAGNOSIS — E11.9 TYPE 2 DIABETES MELLITUS WITHOUT COMPLICATION, WITHOUT LONG-TERM CURRENT USE OF INSULIN (H): ICD-10-CM

## 2024-04-08 DIAGNOSIS — E78.5 HYPERLIPIDEMIA LDL GOAL <130: ICD-10-CM

## 2024-04-08 RX ORDER — GLIPIZIDE 5 MG/1
TABLET ORAL
Qty: 90 TABLET | Refills: 1 | Status: SHIPPED | OUTPATIENT
Start: 2024-04-08 | End: 2024-09-13

## 2024-04-08 RX ORDER — ATORVASTATIN CALCIUM 40 MG/1
TABLET, FILM COATED ORAL
Qty: 90 TABLET | Refills: 1 | Status: SHIPPED | OUTPATIENT
Start: 2024-04-08 | End: 2024-10-03

## 2024-05-15 ENCOUNTER — NURSE TRIAGE (OUTPATIENT)
Dept: FAMILY MEDICINE | Facility: CLINIC | Age: 69
End: 2024-05-15
Payer: COMMERCIAL

## 2024-05-15 DIAGNOSIS — U07.1 INFECTION DUE TO 2019 NOVEL CORONAVIRUS: Primary | ICD-10-CM

## 2024-05-15 NOTE — TELEPHONE ENCOUNTER
Tested covid+ this morning. Symptoms started 5/13/24.    Current Sx:  Headache  Runny nose  Nasal congestion  Sore throat  Cough - productive, brown  Diarrhea      RN COVID TREATMENT VISIT  05/15/24      The patient has been triaged and does not require a higher level of care.    Rommel Bryan  69 year old  Current weight? 280    Has the patient been seen by a primary care provider at an [facility] or [facility] within the past two years? Yes.   Have you been in close proximity to/do you have a known exposure to a person with a confirmed case of influenza? No.     General treatment eligibility:  Date of positive COVID test (PCR or at home)?  5/15    Are you or have you been hospitalized for this COVID-19 infection? No.   Have you received monoclonal antibodies or antiviral treatment for COVID-19 since this positive test? No.   Do you have any of the following conditions that place you at risk of being very sick from COVID-19?   - Age 50 years or older  - Chronic lung diseases such as asthma, bronchiectasis, COPD, interstitial lung disease, pulmonary embolism, pulmonary hypertension   - Diabetes mellitus, type 1 and type 2  - Overweight or Obesity (BMI >85th percentile or BMI 25 or higher)  Yes, patient has at least one high risk condition as noted above.     Current COVID symptoms:   - cough  - fatigue  - headache  - sore throat  - congestion or runny nose  - diarrhea  Yes. Patient has at least one symptom as selected.     How many days since symptoms started? 5 days or less. Established patient, 12 years or older weighing at least 88.2 lbs, who has symptoms that started in the past 5 days, has not been hospitalized nor received treatment already, and is at risk for being very sick from COVID-19.     Treatment eligibility by RN:  Are you currently pregnant or nursing? No  Do you have a clinically significant hypersensitivity to nirmatrelvir or ritonavir, or toxic epidermal  necrolysis (TEN) or Ring-Juancho Syndrome? No  Do you have a history of hepatitis, any hepatic impairment on the Problem List (such as Child-Flores Class C, cirrhosis, fatty liver disease, alcoholic liver disease), or was the last liver lab (hepatic panel, ALT, AST, ALK Phos, bilirubin) elevated in the past 6 months? No  Do you have any history of severe renal impairment (eGFR < 30mL/min)? No    - Fluticasone last used 1 week ago - takes PRN    Is patient eligible to continue? Yes, patient meets all eligibility requirements for the RN COVID treatment (as denoted by all no responses above).     Current Outpatient Medications   Medication Sig Dispense Refill    aspirin 81 MG tablet Take 1 tablet by mouth daily      atorvastatin (LIPITOR) 40 MG tablet TAKE 1 TABLET BY MOUTH DAILY 90 tablet 1    blood glucose (TaskdoerCAN FINEPOINT) lancets Test 1xDay 100 each 3    blood glucose (NO BRAND SPECIFIED) test strip Use to test blood sugar 1 times daily or as directed. 100 strip 3    blood glucose monitoring (NO BRAND SPECIFIED) meter device kit Use to test blood sugar 1 times daily or as directed. 1 kit 3    fluticasone (FLOVENT DISKUS) 250 MCG/ACT inhaler Inhale 1 puff into the lungs every 12 hours As needed      glipiZIDE (GLUCOTROL) 5 MG tablet TAKE 1 TABLET BY MOUTH IN THE  MORNING BEFORE BREAKFAST 90 tablet 1    lisinopril (ZESTRIL) 10 MG tablet Take 1 tablet (10 mg) by mouth daily 90 tablet 3    metFORMIN (GLUCOPHAGE) 500 MG tablet TAKE 1 TABLET BY MOUTH 3 TIMES  DAILY WITH MEALS 270 tablet 0    order for DME Equipment being ordered: CPAP mask only 1 each 0    order for DME Equipment being ordered: CPAP mask   full face mask (CPT code: a7030) and head gear (CPT code: ). 1 each 0       Medications from List 1 of the standing order (on medications that exclude the use of Paxlovid) that patient is taking: NONE. Is patient taking Ru's Wort? No  Is patient taking Ru's Wort or any meds from List 1? No.    Medications from List 2 of the standing order (on meds that provider needs to adjust) that patient is taking: NONE. Is patient on any of the meds from List 2? No.   Medications from List 3 of standing order (on meds that a RN needs to adjust) that patient is taking: atorvastatin (Lipitor): Instructed patient to stop atorvastatin while taking Paxlovid and restart atorvastatin 1 day after the completion of Paxlovid.  Is patient on any meds from List 3? Yes. Patient is on meds from list 3. No meds require a provider visit and at least one med required RN to adjust.     Paxlovid has an approximate 90% reduction in hospitalization. Paxlovid can possibly cause altered sense of taste, diarrhea (loose, watery stools), high blood pressure, muscle aches.     Would patient like a Paxlovid prescription?   Yes.   Lab Results   Component Value Date    GFRESTIMATED >90 09/28/2023       Was last eGFR reduced? No, eGFR 60 or greater/ No Result on record. Patient can receive the normal renal function dose. Paxlovid Rx sent to [facility]       Temporary change to home medications: stop atorvastatin while taking Paxlovid and restart atorvastatin 1 day after the completion of Paxlovid.    All medication adjustments (holds, etc) were discussed with the patient and patient was asked to repeat back (teachback) their med adjustment.  Did patient understand med adjustment? Yes, patient repeated back and understood correctly.        Reviewed the following instructions with the patient:    Paxlovid (nimatrelvir and ritonavir)    How it works  Two medicines (nirmatrelvir and ritonavir) are taken together. They stop the virus from growing. Less amount of virus is easier for your body to fight.    How to take  Medicine comes in a daily container with both medicine tablets. Take by mouth twice daily (once in the morning, once at night) for 5 days.  The number of tablets to take varies by patient.  Don't chew or break capsules. Swallow  whole.    When to take  Take as soon as possible after positive COVID-19 test result, and within 5 days of your first symptoms.    Possible side effects  Can cause altered sense of taste, diarrhea (loose, watery stools), high blood pressure, muscle aches.    June Shukla RN               Reason for Disposition   HIGH RISK patient (e.g., weak immune system, age > 64 years, obesity with BMI of 30 or higher, pregnant, chronic lung disease or other chronic medical condition) and COVID symptoms (e.g., cough, fever)  (Exceptions: Already seen by doctor or NP/PA and no new or worsening symptoms.)    Additional Information   Negative: SEVERE difficulty breathing (e.g., struggling for each breath, speaks in single words)   Negative: Difficult to awaken or acting confused (e.g., disoriented, slurred speech)   Negative: Bluish (or gray) lips or face now   Negative: Shock suspected (e.g., cold/pale/clammy skin, too weak to stand, low BP, rapid pulse)   Negative: Sounds like a life-threatening emergency to the triager   Negative: Diagnosed or suspected COVID-19 and symptoms lasting 3 or more weeks   Negative: COVID-19 exposure and no symptoms   Negative: COVID-19 vaccine reaction suspected (e.g., fever, headache, muscle aches) occurring 1 to 3 days after getting vaccine   Negative: COVID-19 vaccine, questions about   Negative: Lives with someone known to have influenza (flu test positive) and flu-like symptoms (e.g., cough, runny nose, sore throat, SOB; with or without fever)   Negative: Possible COVID-19 symptoms and triager concerned about severity of symptoms or other causes   Negative: COVID-19 and breastfeeding, questions about   Negative: SEVERE or constant chest pain or pressure  (Exception: Mild central chest pain, present only when coughing.)   Negative: MODERATE difficulty breathing (e.g., speaks in phrases, SOB even at rest, pulse 100-120)   Negative: Headache and stiff neck (can't touch chin to chest)    "Negative: Oxygen level (e.g., pulse oximetry) 90% or lower   Negative: Chest pain or pressure  (Exception: MILD central chest pain, present only when coughing.)   Negative: Drinking very little and dehydration suspected (e.g., no urine > 12 hours, very dry mouth, very lightheaded)   Negative: Patient sounds very sick or weak to the triager   Negative: MILD difficulty breathing (e.g., minimal/no SOB at rest, SOB with walking, pulse <100)   Negative: Fever > 103 F (39.4 C)   Negative: Fever > 101 F (38.3 C) and over 60 years of age   Negative: Fever > 100.0 F (37.8 C) and bedridden (e.g., CVA, chronic illness, recovering from surgery)    Answer Assessment - Initial Assessment Questions  1. COVID-19 DIAGNOSIS: \"How do you know that you have COVID?\" (e.g., positive lab test or self-test, diagnosed by doctor or NP/PA, symptoms after exposure).      Today via home test  2. COVID-19 EXPOSURE: \"Was there any known exposure to COVID before the symptoms began?\" CDC Definition of close contact: within 6 feet (2 meters) for a total of 15 minutes or more over a 24-hour period.       Traveled recently  3. ONSET: \"When did the COVID-19 symptoms start?\"       5/13/24  4. WORST SYMPTOM: \"What is your worst symptom?\" (e.g., cough, fever, shortness of breath, muscle aches)      Sore throat  5. COUGH: \"Do you have a cough?\" If Yes, ask: \"How bad is the cough?\"        Yes - productive, moderate  6. FEVER: \"Do you have a fever?\" If Yes, ask: \"What is your temperature, how was it measured, and when did it start?\"      No  7. RESPIRATORY STATUS: \"Describe your breathing?\" (e.g., normal; shortness of breath, wheezing, unable to speak)       Normal  8. BETTER-SAME-WORSE: \"Are you getting better, staying the same or getting worse compared to yesterday?\"  If getting worse, ask, \"In what way?\"      Worse - symptoms progressing  9. OTHER SYMPTOMS: \"Do you have any other symptoms?\"  (e.g., chills, fatigue, headache, loss of smell or taste, muscle " "pain, sore throat)      Headache, runny nose, nasal congestion, sore throat, diarrhea  10. HIGH RISK DISEASE: \"Do you have any chronic medical problems?\" (e.g., asthma, heart or lung disease, weak immune system, obesity, etc.)        Asthma, Diabetes  11. VACCINE: \"Have you had the COVID-19 vaccine?\" If Yes, ask: \"Which one, how many shots, when did you get it?\"        Yes  12. PREGNANCY: \"Is there any chance you are pregnant?\" \"When was your last menstrual period?\"        N/A  13. O2 SATURATION MONITOR:  \"Do you use an oxygen saturation monitor (pulse oximeter) at home?\" If Yes, ask \"What is your reading (oxygen level) today?\" \"What is your usual oxygen saturation reading?\" (e.g., 95%)        No    Protocols used: Coronavirus (COVID-19) Diagnosed or [id number]    "

## 2024-07-24 ENCOUNTER — TELEPHONE (OUTPATIENT)
Dept: PHARMACY | Facility: CLINIC | Age: 69
End: 2024-07-24
Payer: COMMERCIAL

## 2024-07-24 NOTE — TELEPHONE ENCOUNTER
We have been unable to reach this patient for MTM follow-up after several attempts. We will stop reaching out to the patient at this time. Please let us know if we can assist in this patient's care in the future.    Routing to PCP as ERICK Fox, PharmD, [facility]  Medication Therapy Management Provider  [phone]

## 2024-07-28 DIAGNOSIS — E11.9 TYPE 2 DIABETES MELLITUS WITHOUT COMPLICATION, WITHOUT LONG-TERM CURRENT USE OF INSULIN (H): ICD-10-CM

## 2024-07-29 RX ORDER — LISINOPRIL 10 MG/1
10 TABLET ORAL DAILY
Qty: 90 TABLET | Refills: 0 | Status: SHIPPED | OUTPATIENT
Start: 2024-07-29 | End: 2024-10-03

## 2024-09-11 ENCOUNTER — TRANSFERRED RECORDS (OUTPATIENT)
Dept: HEALTH INFORMATION MANAGEMENT | Facility: CLINIC | Age: 69
End: 2024-09-11
Payer: COMMERCIAL

## 2024-09-11 LAB — RETINOPATHY: NEGATIVE

## 2024-09-13 DIAGNOSIS — E11.9 TYPE 2 DIABETES MELLITUS WITHOUT COMPLICATION, WITHOUT LONG-TERM CURRENT USE OF INSULIN (H): ICD-10-CM

## 2024-09-13 RX ORDER — GLIPIZIDE 5 MG/1
TABLET ORAL
Qty: 90 TABLET | Refills: 0 | Status: SHIPPED | OUTPATIENT
Start: 2024-09-13 | End: 2024-10-03

## 2024-10-03 ENCOUNTER — OFFICE VISIT (OUTPATIENT)
Dept: FAMILY MEDICINE | Facility: CLINIC | Age: 69
End: 2024-10-03
Payer: COMMERCIAL

## 2024-10-03 VITALS
HEART RATE: 71 BPM | TEMPERATURE: 97.7 F | WEIGHT: 275 LBS | BODY MASS INDEX: 40.73 KG/M2 | HEIGHT: 69 IN | OXYGEN SATURATION: 97 % | RESPIRATION RATE: 16 BRPM | SYSTOLIC BLOOD PRESSURE: 125 MMHG | DIASTOLIC BLOOD PRESSURE: 77 MMHG

## 2024-10-03 DIAGNOSIS — E66.01 OBESITY, CLASS III, BMI 40-49.9 (MORBID OBESITY) (H): ICD-10-CM

## 2024-10-03 DIAGNOSIS — Z12.5 SCREENING FOR PROSTATE CANCER: ICD-10-CM

## 2024-10-03 DIAGNOSIS — Z00.00 ENCOUNTER FOR INITIAL ANNUAL WELLNESS VISIT (AWV) IN MEDICARE PATIENT: Primary | ICD-10-CM

## 2024-10-03 DIAGNOSIS — E78.5 HYPERLIPIDEMIA LDL GOAL <130: ICD-10-CM

## 2024-10-03 DIAGNOSIS — Z12.11 SCREEN FOR COLON CANCER: ICD-10-CM

## 2024-10-03 DIAGNOSIS — Z13.29 SCREENING FOR THYROID DISORDER: ICD-10-CM

## 2024-10-03 DIAGNOSIS — E11.65 TYPE 2 DIABETES MELLITUS WITH HYPERGLYCEMIA, WITHOUT LONG-TERM CURRENT USE OF INSULIN (H): ICD-10-CM

## 2024-10-03 LAB
BASOPHILS # BLD AUTO: 0 10E3/UL (ref 0–0.2)
BASOPHILS NFR BLD AUTO: 1 %
CHOLEST SERPL-MCNC: 164 MG/DL
CREAT SERPL-MCNC: 0.73 MG/DL (ref 0.67–1.17)
CREAT UR-MCNC: 24.2 MG/DL
EGFRCR SERPLBLD CKD-EPI 2021: >90 ML/MIN/1.73M2
EOSINOPHIL # BLD AUTO: 0.4 10E3/UL (ref 0–0.7)
EOSINOPHIL NFR BLD AUTO: 5 %
ERYTHROCYTE [DISTWIDTH] IN BLOOD BY AUTOMATED COUNT: 12.9 % (ref 10–15)
EST. AVERAGE GLUCOSE BLD GHB EST-MCNC: 192 MG/DL
FASTING STATUS PATIENT QL REPORTED: YES
HBA1C MFR BLD: 8.3 % (ref 0–5.6)
HCT VFR BLD AUTO: 43.2 % (ref 40–53)
HDLC SERPL-MCNC: 40 MG/DL
HGB BLD-MCNC: 13.9 G/DL (ref 13.3–17.7)
IMM GRANULOCYTES # BLD: 0 10E3/UL
IMM GRANULOCYTES NFR BLD: 0 %
LDLC SERPL CALC-MCNC: 97 MG/DL
LYMPHOCYTES # BLD AUTO: 2.9 10E3/UL (ref 0.8–5.3)
LYMPHOCYTES NFR BLD AUTO: 37 %
MCH RBC QN AUTO: 27 PG (ref 26.5–33)
MCHC RBC AUTO-ENTMCNC: 32.2 G/DL (ref 31.5–36.5)
MCV RBC AUTO: 84 FL (ref 78–100)
MICROALBUMIN UR-MCNC: <12 MG/L
MICROALBUMIN/CREAT UR: NORMAL MG/G{CREAT}
MONOCYTES # BLD AUTO: 0.7 10E3/UL (ref 0–1.3)
MONOCYTES NFR BLD AUTO: 9 %
NEUTROPHILS # BLD AUTO: 3.8 10E3/UL (ref 1.6–8.3)
NEUTROPHILS NFR BLD AUTO: 48 %
NONHDLC SERPL-MCNC: 124 MG/DL
PLATELET # BLD AUTO: 220 10E3/UL (ref 150–450)
PSA SERPL DL<=0.01 NG/ML-MCNC: 0.45 NG/ML (ref 0–4.5)
RBC # BLD AUTO: 5.14 10E6/UL (ref 4.4–5.9)
TRIGL SERPL-MCNC: 134 MG/DL
TSH SERPL DL<=0.005 MIU/L-ACNC: 2.3 UIU/ML (ref 0.3–4.2)
WBC # BLD AUTO: 7.8 10E3/UL (ref 4–11)

## 2024-10-03 PROCEDURE — 82043 UR ALBUMIN QUANTITATIVE: CPT | Performed by: INTERNAL MEDICINE

## 2024-10-03 PROCEDURE — G0402 INITIAL PREVENTIVE EXAM: HCPCS | Performed by: INTERNAL MEDICINE

## 2024-10-03 PROCEDURE — G0103 PSA SCREENING: HCPCS | Performed by: INTERNAL MEDICINE

## 2024-10-03 PROCEDURE — 82565 ASSAY OF CREATININE: CPT | Performed by: INTERNAL MEDICINE

## 2024-10-03 PROCEDURE — 82570 ASSAY OF URINE CREATININE: CPT | Performed by: INTERNAL MEDICINE

## 2024-10-03 PROCEDURE — 83036 HEMOGLOBIN GLYCOSYLATED A1C: CPT | Performed by: INTERNAL MEDICINE

## 2024-10-03 PROCEDURE — 36415 COLL VENOUS BLD VENIPUNCTURE: CPT | Performed by: INTERNAL MEDICINE

## 2024-10-03 PROCEDURE — 84443 ASSAY THYROID STIM HORMONE: CPT | Performed by: INTERNAL MEDICINE

## 2024-10-03 PROCEDURE — 85025 COMPLETE CBC W/AUTO DIFF WBC: CPT | Performed by: INTERNAL MEDICINE

## 2024-10-03 PROCEDURE — 80061 LIPID PANEL: CPT | Performed by: INTERNAL MEDICINE

## 2024-10-03 RX ORDER — LISINOPRIL 10 MG/1
10 TABLET ORAL DAILY
Qty: 90 TABLET | Refills: 0 | Status: SHIPPED | OUTPATIENT
Start: 2024-10-03

## 2024-10-03 RX ORDER — GLIPIZIDE 5 MG/1
TABLET ORAL
Qty: 90 TABLET | Refills: 0 | Status: SHIPPED | OUTPATIENT
Start: 2024-10-03

## 2024-10-03 RX ORDER — ACYCLOVIR 800 MG/1
TABLET ORAL
Qty: 6 EACH | Refills: 3 | Status: SHIPPED | OUTPATIENT
Start: 2024-10-03 | End: 2024-10-07

## 2024-10-03 RX ORDER — ATORVASTATIN CALCIUM 40 MG/1
40 TABLET, FILM COATED ORAL DAILY
Qty: 90 TABLET | Refills: 1 | Status: SHIPPED | OUTPATIENT
Start: 2024-10-03

## 2024-10-03 RX ORDER — KETOROLAC TROMETHAMINE 30 MG/ML
1 INJECTION, SOLUTION INTRAMUSCULAR; INTRAVENOUS
Qty: 1 EACH | Refills: 3 | Status: SHIPPED | OUTPATIENT
Start: 2024-10-03 | End: 2024-10-07

## 2024-10-03 SDOH — HEALTH STABILITY: PHYSICAL HEALTH: ON AVERAGE, HOW MANY DAYS PER WEEK DO YOU ENGAGE IN MODERATE TO STRENUOUS EXERCISE (LIKE A BRISK WALK)?: 1 DAY

## 2024-10-03 ASSESSMENT — SOCIAL DETERMINANTS OF HEALTH (SDOH): HOW OFTEN DO YOU GET TOGETHER WITH FRIENDS OR RELATIVES?: MORE THAN THREE TIMES A WEEK

## 2024-10-03 ASSESSMENT — ASTHMA QUESTIONNAIRES
QUESTION_2 LAST FOUR WEEKS HOW OFTEN HAVE YOU HAD SHORTNESS OF BREATH: NOT AT ALL
ACT_TOTALSCORE: 19
QUESTION_5 LAST FOUR WEEKS HOW WOULD YOU RATE YOUR ASTHMA CONTROL: WELL CONTROLLED
QUESTION_4 LAST FOUR WEEKS HOW OFTEN HAVE YOU USED YOUR RESCUE INHALER OR NEBULIZER MEDICATION (SUCH AS ALBUTEROL): NOT AT ALL
ACT_TOTALSCORE: 19
QUESTION_3 LAST FOUR WEEKS HOW OFTEN DID YOUR ASTHMA SYMPTOMS (WHEEZING, COUGHING, SHORTNESS OF BREATH, CHEST TIGHTNESS OR PAIN) WAKE YOU UP AT NIGHT OR EARLIER THAN USUAL IN THE MORNING: ONCE OR TWICE
QUESTION_1 LAST FOUR WEEKS HOW MUCH OF THE TIME DID YOUR ASTHMA KEEP YOU FROM GETTING AS MUCH DONE AT WORK, SCHOOL OR AT HOME: ALL OF THE TIME

## 2024-10-03 ASSESSMENT — PAIN SCALES - GENERAL: PAINLEVEL: NO PAIN (0)

## 2024-10-03 NOTE — PROGRESS NOTES
"Preventive Care Visit  [facility]  Beckie Khan MD, Internal Medicine  Oct 3, 2024      Assessment & Plan     Encounter for initial annual wellness visit (AWV) in Medicare patient  - CBC with platelets and differential    Obesity, Class III, BMI 40-49.9 (morbid obesity) (H)  - diet, exercise    Type 2 diabetes mellitus with hyperglycemia, without long-term current use of insulin (H)  - Continuous Glucose Sensor (FREESTYLE CHRISTIAN 3 SENSOR) MISC  Dispense: 6 each; Refill: 3  - Continuous Glucose  (FREESTYLE CHRISTIAN 3 READER) DAMIAN  Dispense: 1 each; Refill: 3  - Creatinine  - Hemoglobin A1c  - Albumin Random Urine Quantitative with Creat Ratio  -  metFORMIN (GLUCOPHAGE) 500 MG tablet  Dispense: 270 tablet; Refill: 1  - lisinopril (ZESTRIL) 10 MG tablet  Dispense: 90 tablet; Refill: 0  - glipiZIDE (GLUCOTROL) 5 MG tablet  Dispense: 90 tablet; Refill: 0    Hyperlipidemia LDL goal <130  - atorvastatin (LIPITOR) 40 MG tablet  Dispense: 90 tablet; Refill: 1  - Lipid panel reflex to direct LDL Fasting    Screening for prostate cancer  - PSA, screen    Screening for thyroid disorder  - TSH with free T4 reflex    Screen for colon cancer  - Colonoscopy Screening  Referral      Patient has been advised of split billing requirements and indicates understanding: Yes        BMI  Estimated body mass index is 40.61 kg/m  as calculated from the following:    Height as of this encounter: 1.753 m (5' 9\").    Weight as of this encounter: 124.7 kg (275 lb).   Weight management plan: Discussed healthy diet and exercise guidelines    Counseling  Appropriate preventive services were addressed with this patient via screening, questionnaire, or discussion as appropriate for fall prevention, nutrition, physical activity, Tobacco-use cessation, social engagement, weight loss and cognition.  Checklist reviewing preventive services available has been given to the patient.  Reviewed patient's diet, addressing " concerns and/or questions.   He is at risk for lack of exercise and has been provided with information to increase physical activity for the benefit of his well-being.   Discussed possible causes of fatigue. The patient was provided with written information regarding signs of hearing loss.   Information on urinary incontinence and treatment options given to patient.       FUTURE APPOINTMENTS:       - Follow-up visit in 1 year    Elvi Carney is a 69 year old, presenting for the following:    Health Care Directive  Patient does not have a Health Care Directive or Living Will: Discussed advance care planning with patient; information given to patient to review.    HPI        10/3/2024   General Health   How would you rate your overall physical health? (!) FAIR   Feel stress (tense, anxious, or unable to sleep) Patient declined            10/3/2024   Nutrition   Diet: I don't know            10/3/2024   Exercise   Days per week of moderate/strenous exercise 1 day      (!) EXERCISE CONCERN      10/3/2024   Social Factors   Frequency of gathering with friends or relatives More than three times a week   Worry food won't last until get money to buy more Patient declined   Food not last or not have enough money for food? Patient declined   Do you have housing? (Housing is defined as stable permanent housing and does not include staying ouside in a car, in a tent, in an abandoned building, in an overnight shelter, or couch-surfing.) Patient declined   Are you worried about losing your housing? Patient declined   Lack of transportation? Patient declined   Unable to get utilities (heat,electricity)? Patient declined            10/3/2024   Fall Risk   Fallen 2 or more times in the past year? No   Trouble with walking or balance? No             10/3/2024   Activities of Daily Living- Home Safety   Needs help with the following daily activites None of the above   Safety concerns in the home None of the above             10/3/2024   Dental   Dentist two times every year? Yes            10/3/2024   Hearing Screening   Hearing concerns? (!) IT'S HARD TO FOLLOW A CONVERSATION IN A NOISY RESTAURANT OR CROWDED ROOM.            10/3/2024   Driving Risk Screening   Patient/family members have concerns about driving (!) DECLINE            10/3/2024   General Alertness/Fatigue Screening   Have you been more tired than usual lately? (!) YES            10/3/2024   Urinary Incontinence Screening   Bothered by leaking urine in past 6 months Yes            10/3/2024   TB Screening   Were you born outside of the [place]? Yes            Today's PHQ-2 Score:       10/3/2024     9:47 AM   PHQ-2 ( 1999 Pfizer)   Q1: Little interest or pleasure in doing things 0   Q2: Feeling down, depressed or hopeless 0   PHQ-2 Score 0   Q1: Little interest or pleasure in doing things Not at all   Q2: Feeling down, depressed or hopeless Not at all   PHQ-2 Score 0           10/3/2024   Substance Use   Alcohol more than 3/day or more than 7/wk No   Do you have a current opioid prescription? No   How severe/bad is pain from 1 to 10? 2/10   Do you use any other substances recreationally? No        Social History     Tobacco Use    Smoking status: Never    Smokeless tobacco: Never   Substance Use Topics    Alcohol use: Yes     Comment: maybe 1-2  glasses a year  special occasion     Drug use: No           10/3/2024   AAA Screening   Family history of Abdominal Aortic Aneurysm (AAA)? Unsure      Last PSA:   [facility] PSA   Date Value Ref Range Status   05/18/2012 0.3 < OR = 4.0 ng/mL Final     Comment:        This test was performed using the Siemens  chemiluminescent method. Values obtained from  different assay methods cannot be used  interchangeably. PSA levels, regardless of  value, should not be interpreted as absolute  evidence of the presence or absence of disease.        PSA   Date Value Ref Range Status   03/11/2020 0.51 0 - 4 ug/L Final     Comment:     Assay Method:   Chemiluminescence using Siemens Vista analyzer     Prostate Specific Antigen Screen   Date Value Ref Range Status   09/28/2023 0.26 0.00 - 4.50 ng/mL Final   10/18/2022 0.51 0.00 - 4.00 ug/L Final     ASCVD Risk   The 10-year ASCVD risk score (Barak MACIAS, et al., 2019) is: 29.5%    Values used to calculate the score:      Age: 69 years      Sex: Male      Is Non- : No      Diabetic: Yes      Tobacco smoker: No      Systolic Blood Pressure: 125 mmHg      Is BP treated: No      HDL Cholesterol: 43 mg/dL      Total Cholesterol: 181 mg/dL        Reviewed and updated as needed this visit by Provider                    Past Medical History:   Diagnosis Date    Asthma     Family history of coronary artery disease 01/04/2013    HTN (hypertension)     Hypercholesterolemia     Hyperlipidemia LDL goal <130 01/04/2013    Moderate persistent asthma 01/04/2013    Obesity, Class III, BMI 40-49.9 (morbid obesity) (H) 01/04/2013    LYRIC (obstructive sleep apnea)     Squamous cell carcinoma     Transaminasemia 01/04/2013    Type 2 diabetes mellitus (H) 03/2020     Current providers sharing in care for this patient include:  Patient Care Team:  Clinic - [facility] as PCP - Denice Goldstein PA-C as Physician Assistant (Dermatology)  Briana Valdivia MD as MD (Dermatology)  Beckie Khan MD as MD (Internal Medicine)  Beckie Khan MD as Assigned PCP  Briana Valdivia MD as Assigned Surgical Provider  Pamela Fox, PharmD as Assigned MTM Pharmacist    The following health maintenance items are reviewed in Epic and correct as of today:  Health Maintenance   Topic Date Due    ZOSTER IMMUNIZATION (1 of 2) Never done    RSV VACCINE (1 - Risk 60-74 years 1-dose series) Never done    ASTHMA ACTION PLAN  04/12/2023    BMP  10/18/2023    DIABETIC FOOT EXAM  10/18/2023    COLORECTAL CANCER SCREENING  03/11/2024    A1C  03/28/2024    LIPID  09/28/2024    MICROALBUMIN   "09/28/2024    ANNUAL REVIEW OF  ORDERS  09/28/2024    ASTHMA CONTROL TEST  04/03/2025    EYE EXAM  09/11/2025    MEDICARE ANNUAL WELLNESS VISIT  10/03/2025    FALL RISK ASSESSMENT  10/03/2025    DTAP/TDAP/TD IMMUNIZATION (2 - Td or Tdap) 01/11/2028    ADVANCE CARE PLANNING  10/03/2029    HEPATITIS C SCREENING  Completed    PHQ-2 (once per calendar year)  Completed    INFLUENZA VACCINE  Completed    Pneumococcal Vaccine: 65+ Years  Completed    COVID-19 Vaccine  Completed    HPV IMMUNIZATION  Aged Out    MENINGITIS IMMUNIZATION  Aged Out    RSV MONOCLONAL ANTIBODY  Aged Out         Review of Systems  Constitutional, HEENT, cardiovascular, pulmonary, GI, , musculoskeletal, neuro, skin, endocrine and psych systems are negative, except as otherwise noted.     Objective    Exam  /77   Pulse 71   Temp 97.7  F (36.5  C) (Temporal)   Resp 16   Ht 1.753 m (5' 9\")   Wt 124.7 kg (275 lb)   SpO2 97%   BMI 40.61 kg/m     Estimated body mass index is 40.61 kg/m  as calculated from the following:    Height as of this encounter: 1.753 m (5' 9\").    Weight as of this encounter: 124.7 kg (275 lb).    Physical Exam  GENERAL: alert and no distress  EYES: Eyes grossly normal to inspection,conjunctivae and sclerae normal  HENT: ear canals and TM's normal, nose and mouth without ulcers or lesions  NECK: no asymmetry  RESP: lungs clear to auscultation - no rales, rhonchi or wheezes  CV: regular rate and rhythm, normal S1 S2  ABDOMEN: soft, nontender, bowel sounds normal  MS: no gross musculoskeletal defects noted, no edema  SKIN: no suspicious lesions or rashes  NEURO: Normal strength and tone, mentation intact and speech normal  PSYCH: mentation appears normal, affect normal/bright          10/3/2024   Mini Cog   Clock Draw Score 2 Normal   3 Item Recall 3 objects recalled   Mini Cog Total Score 5            Vision Screen    Eye exam L: 20/32 R: 10/12.5 B: 20/25          Signed Electronically by: Beckie Khan MD    "

## 2024-10-07 ENCOUNTER — TELEPHONE (OUTPATIENT)
Dept: FAMILY MEDICINE | Facility: CLINIC | Age: 69
End: 2024-10-07
Payer: COMMERCIAL

## 2024-10-07 DIAGNOSIS — E11.65 TYPE 2 DIABETES MELLITUS WITH HYPERGLYCEMIA, WITHOUT LONG-TERM CURRENT USE OF INSULIN (H): ICD-10-CM

## 2024-10-07 RX ORDER — HYDROCHLOROTHIAZIDE 12.5 MG/1
CAPSULE ORAL
Qty: 2 EACH | Refills: 5 | Status: SHIPPED | OUTPATIENT
Start: 2024-10-07

## 2024-10-07 NOTE — TELEPHONE ENCOUNTER
Pt called, insurance needs new Rx for freestyle Susu 3 plus sensor. Rx for reader not needed as patient is using his phone as a reader.

## 2024-10-08 NOTE — TELEPHONE ENCOUNTER
Patient calling to check on script and ensure 3 plus was ordered     Informed it was sent    Dariel Feliz RN

## 2024-10-14 NOTE — PROGRESS NOTES
[facility] Dermatology Note  Encounter Date: Oct 15, 2024  Office Visit     Dermatology Problem List:    #LTM:   - R temple, slightly erythematous cerebriform thin papule  - R nasal ala, non specific excoriated flesh colored pap, similar lesion inferiorly.     1. Hx of SCC, right ear, s/p Mohs 2019  2. AK's  - S/p 5FU/C fall 2023  - R Forehead and vertex scalp, s/p cryo 01/30/24  - L temple 10/15/2024  3. SK, L upper back, s/p shave bx 10/1723  4. ISK, right upper eyelid, s/p cryo 01/30/24  5. Tinea pedis   - current tx: ketoconazole 2% cream   ____________________________________________    Assessment & Plan:    # Lesions to monitor   - R temple, slightly erythematous cerebriform thin papule. suspect ISK. will treat w cryo, if doesn't resolve plan for field therapy and then recheck next visit  - R nasal ala, non specific excoriated flesh colored pap, similar lesion inferiorly. favor excoriated fibrous papule, discussed bx vs monitoring, favor monitoring   - R paraspinal lower back, several erythematous stuck on papules, favor sk     # Tinea pedis   - start ketoconazole 2% cream     # AK  - cryo done today     # Multiple benign nevi.   - Monitor for ABCDEs of melanoma   - Continue sun protection - recommend SPF 30 or higher with frequent application   - Return sooner if noticing changing or symptomatic lesions     # Benign lesions - SKs, cherry angiomas, lentigenes.  - No treatment required     # Hx NMSC.  -No signs of recurrence  - Continue regular skin examinations  - Sun precaution was advised including the use of sun screens of SPF 30 or higher, sun protective clothing, and avoidance of tanning beds.     Procedures Performed:   Cryotherapy procedure note: After verbal consent and discussion of risks and benefits including but no limited to dyspigmentation/scar, blister, and pain, 1 AK was(were) treated with 1-2mm freeze border for 2 cycles with liquid nitrogen. Post cryotherapy instructions  were provided.      Follow-up: 3-6 month(s) in-person for recheck, or earlier for new or changing lesions    Staff and Scribe:     Scribe Disclosure:   I, Neeta Jimenez, am serving as a scribe; to document services personally performed by Briana Valdivia MD -based on data collection and the provider's statements to me.     Provider Disclosure:   The documentation recorded by the scribe accurately reflects the services I personally performed and the decisions made by me.    Briana Valdivia MD    Department of Dermatology  [facility]: Phone: [phone], Fax: [phone]  10/21/2024     ____________________________________________    CC: Skin Check (Spots on right and left temples/Spot on nose/Couple of spots on lower back)    HPI:  Mr. Rommel Bryan is a(n) 69 year old male who presents today as a return patient for recheck after field therapy.    The patient reports that one of his spots of concern is scabbing over a bit. He puts lotion on it to help in symptoms. He notes that this is a spot that his C-pap mask rubs on, so the lotion doesn't help heal the area. He reports that some of the spots are sore, he is unsure if they are growing. Overall, his life and health has been going well.    Patient is otherwise feeling well, without additional skin concerns.    Labs Reviewed:  N/A      Physical exam:  Vitals: There were no vitals taken for this visit.  GEN: This is a well developed, well-nourished male in no acute distress, in a pleasant mood.    SKIN: Focused examination of the below was performed.  - R temple, slightly erythematous cerebriform thin papule  - R nasal ala, non specific excoriated flesh colored pap, similar lesion inferiorly    - toe web maceration bilateral feet  - R paraspinal lower back, several erythematous stuck on papules   - There are dome shaped bright red papules on the trunk and  extremities .   - Multiple regular brown pigmented macules and papules are identified on the trunk and extremities. .   - Scattered brown macules on sun exposed areas.  - Waxy stuck on papules and plaques on trunk and extremities.    - There is a waxy stuck on tan to brown papule on the L temple.  - No other lesions of concern on areas examined.       Medications:  Current Outpatient Medications   Medication Sig Dispense Refill    aspirin 81 MG tablet Take 1 tablet by mouth daily      atorvastatin (LIPITOR) 40 MG tablet Take 1 tablet (40 mg) by mouth daily. 90 tablet 1    blood glucose (NO BRAND SPECIFIED) test strip Use to test blood sugar 1 times daily or as directed. 100 strip 3    blood glucose monitoring (NO BRAND SPECIFIED) meter device kit Use to test blood sugar 1 times daily or as directed. 1 kit 3    Continuous Glucose Sensor (FREESTYLE CHRISTIAN 3 PLUS SENSOR) MISC Change every 15 days. 2 each 5    fluticasone (FLOVENT DISKUS) 250 MCG/ACT inhaler Inhale 1 puff into the lungs every 12 hours As needed      glipiZIDE (GLUCOTROL) 5 MG tablet TAKE 1 TABLET BY MOUTH IN  THE MORNING BEFORE  BREAKFAST 90 tablet 0    lisinopril (ZESTRIL) 10 MG tablet Take 1 tablet (10 mg) by mouth daily. 90 tablet 0    metFORMIN (GLUCOPHAGE) 500 MG tablet Take 1 tablet (500 mg) by mouth 3 times daily (with meals). 270 tablet 1    order for DME Equipment being ordered: CPAP mask only 1 each 0    order for DME Equipment being ordered: CPAP mask   full face mask (CPT code: a7030) and head gear (CPT code: ). 1 each 0     No current facility-administered medications for this visit.      Past Medical History:   Patient Active Problem List   Diagnosis    LYRIC (obstructive sleep apnea)    Hyperlipidemia LDL goal <130    Family history of coronary artery disease    Obesity, Class III, BMI 40-49.9 (morbid obesity) (H)    Transaminasemia    Moderate persistent asthma without complication    Hyperglycemia    Type 2 diabetes mellitus without  complication, without long-term current use of insulin (H)    History of squamous cell carcinoma of skin     Past Medical History:   Diagnosis Date    Asthma     Family history of coronary artery disease 01/04/2013    HTN (hypertension)     Hypercholesterolemia     Hyperlipidemia LDL goal <130 01/04/2013    Moderate persistent asthma 01/04/2013    Obesity, Class III, BMI 40-49.9 (morbid obesity) (H) 01/04/2013    LYRIC (obstructive sleep apnea)     Squamous cell carcinoma     Transaminasemia 01/04/2013    Type 2 diabetes mellitus (H) 03/2020        CC No referring provider defined for this encounter. on close of this encounter.

## 2024-10-15 ENCOUNTER — OFFICE VISIT (OUTPATIENT)
Dept: DERMATOLOGY | Facility: CLINIC | Age: 69
End: 2024-10-15
Attending: DERMATOLOGY
Payer: COMMERCIAL

## 2024-10-15 DIAGNOSIS — Z85.828 HISTORY OF NONMELANOMA SKIN CANCER: ICD-10-CM

## 2024-10-15 DIAGNOSIS — Z12.83 SKIN CANCER SCREENING: Primary | ICD-10-CM

## 2024-10-15 DIAGNOSIS — B35.3 TINEA PEDIS OF BOTH FEET: ICD-10-CM

## 2024-10-15 DIAGNOSIS — L57.0 ACTINIC KERATOSIS: ICD-10-CM

## 2024-10-15 DIAGNOSIS — D18.01 CHERRY ANGIOMA: ICD-10-CM

## 2024-10-15 DIAGNOSIS — D22.9 MULTIPLE BENIGN NEVI: ICD-10-CM

## 2024-10-15 DIAGNOSIS — L81.4 SOLAR LENTIGO: ICD-10-CM

## 2024-10-15 DIAGNOSIS — L82.1 SEBORRHEIC KERATOSES: ICD-10-CM

## 2024-10-15 PROCEDURE — 99213 OFFICE O/P EST LOW 20 MIN: CPT | Mod: 25 | Performed by: DERMATOLOGY

## 2024-10-15 PROCEDURE — 17000 DESTRUCT PREMALG LESION: CPT | Performed by: DERMATOLOGY

## 2024-10-15 RX ORDER — KETOCONAZOLE 20 MG/G
CREAM TOPICAL 2 TIMES DAILY
Qty: 60 G | Refills: 11 | Status: SHIPPED | OUTPATIENT
Start: 2024-10-15

## 2024-10-15 ASSESSMENT — PAIN SCALES - GENERAL: PAINLEVEL: NO PAIN (0)

## 2024-10-15 NOTE — LETTER
10/15/2024      Rommel Bryan  [address] [address]      Dear Colleague,    Thank you for referring your patient, Rommel Bryan, to the [facility]. Please see a copy of my visit note below.    [facility] Dermatology Note  Encounter Date: Oct 15, 2024  Office Visit     Dermatology Problem List:    #LTM:   - R temple, slightly erythematous cerebriform thin papule  - R nasal ala, non specific excoriated flesh colored pap, similar lesion inferiorly.     1. Hx of SCC, right ear, s/p Mohs 2019  2. AK's  - S/p 5FU/C fall 2023  - R Forehead and vertex scalp, s/p cryo 01/30/24  - L temple 10/15/2024  3. SK, L upper back, s/p shave bx 10/1723  4. ISK, right upper eyelid, s/p cryo 01/30/24  5. Tinea pedis   - current tx: ketoconazole 2% cream   ____________________________________________    Assessment & Plan:    # Lesions to monitor   - R temple, slightly erythematous cerebriform thin papule. suspect ISK. will treat w cryo, if doesn't resolve plan for field therapy and then recheck next visit  - R nasal ala, non specific excoriated flesh colored pap, similar lesion inferiorly. favor excoriated fibrous papule, discussed bx vs monitoring, favor monitoring   - R paraspinal lower back, several erythematous stuck on papules, favor sk     # Tinea pedis   - start ketoconazole 2% cream     # AK  - cryo done today     # Multiple benign nevi.   - Monitor for ABCDEs of melanoma   - Continue sun protection - recommend SPF 30 or higher with frequent application   - Return sooner if noticing changing or symptomatic lesions     # Benign lesions - SKs, cherry angiomas, lentigenes.  - No treatment required     # Hx NMSC.  -No signs of recurrence  - Continue regular skin examinations  - Sun precaution was advised including the use of sun screens of SPF 30 or higher, sun protective clothing, and avoidance of tanning beds.     Procedures Performed:   Cryotherapy procedure  note: After verbal consent and discussion of risks and benefits including but no limited to dyspigmentation/scar, blister, and pain, 1 AK was(were) treated with 1-2mm freeze border for 2 cycles with liquid nitrogen. Post cryotherapy instructions were provided.      Follow-up: 3-6 month(s) in-person for recheck, or earlier for new or changing lesions    Staff and Scribe:     Scribe Disclosure:   I, Neeta Jimenez, am serving as a scribe; to document services personally performed by Briana Valdivia MD -based on data collection and the provider's statements to me.     Provider Disclosure:   The documentation recorded by the scribe accurately reflects the services I personally performed and the decisions made by me.    Briana Valdivia MD    Department of Dermatology  [facility]: Phone: [phone], Fax: [phone]  10/21/2024     ____________________________________________    CC: Skin Check (Spots on right and left temples/Spot on nose/Couple of spots on lower back)    HPI:  Mr. Rommel Bryan is a(n) 69 year old male who presents today as a return patient for recheck after field therapy.    The patient reports that one of his spots of concern is scabbing over a bit. He puts lotion on it to help in symptoms. He notes that this is a spot that his C-pap mask rubs on, so the lotion doesn't help heal the area. He reports that some of the spots are sore, he is unsure if they are growing. Overall, his life and health has been going well.    Patient is otherwise feeling well, without additional skin concerns.    Labs Reviewed:  N/A      Physical exam:  Vitals: There were no vitals taken for this visit.  GEN: This is a well developed, well-nourished male in no acute distress, in a pleasant mood.    SKIN: Focused examination of the below was performed.  - R temple, slightly erythematous cerebriform thin papule  - R nasal  ala, non specific excoriated flesh colored pap, similar lesion inferiorly    - toe web maceration bilateral feet  - R paraspinal lower back, several erythematous stuck on papules   - There are dome shaped bright red papules on the trunk and extremities .   - Multiple regular brown pigmented macules and papules are identified on the trunk and extremities. .   - Scattered brown macules on sun exposed areas.  - Waxy stuck on papules and plaques on trunk and extremities.    - There is a waxy stuck on tan to brown papule on the L temple.  - No other lesions of concern on areas examined.       Medications:  Current Outpatient Medications   Medication Sig Dispense Refill     aspirin 81 MG tablet Take 1 tablet by mouth daily       atorvastatin (LIPITOR) 40 MG tablet Take 1 tablet (40 mg) by mouth daily. 90 tablet 1     blood glucose (NO BRAND SPECIFIED) test strip Use to test blood sugar 1 times daily or as directed. 100 strip 3     blood glucose monitoring (NO BRAND SPECIFIED) meter device kit Use to test blood sugar 1 times daily or as directed. 1 kit 3     Continuous Glucose Sensor (FREESTYLE CHRISTIAN 3 PLUS SENSOR) MISC Change every 15 days. 2 each 5     fluticasone (FLOVENT DISKUS) 250 MCG/ACT inhaler Inhale 1 puff into the lungs every 12 hours As needed       glipiZIDE (GLUCOTROL) 5 MG tablet TAKE 1 TABLET BY MOUTH IN  THE MORNING BEFORE  BREAKFAST 90 tablet 0     lisinopril (ZESTRIL) 10 MG tablet Take 1 tablet (10 mg) by mouth daily. 90 tablet 0     metFORMIN (GLUCOPHAGE) 500 MG tablet Take 1 tablet (500 mg) by mouth 3 times daily (with meals). 270 tablet 1     order for DME Equipment being ordered: CPAP mask only 1 each 0     order for DME Equipment being ordered: CPAP mask   full face mask (CPT code: a7030) and head gear (CPT code: ). 1 each 0     No current facility-administered medications for this visit.      Past Medical History:   Patient Active Problem List   Diagnosis     LYRIC (obstructive sleep apnea)      Hyperlipidemia LDL goal <130     Family history of coronary artery disease     Obesity, Class III, BMI 40-49.9 (morbid obesity) (H)     Transaminasemia     Moderate persistent asthma without complication     Hyperglycemia     Type 2 diabetes mellitus without complication, without long-term current use of insulin (H)     History of squamous cell carcinoma of skin     Past Medical History:   Diagnosis Date     Asthma      Family history of coronary artery disease 01/04/2013     HTN (hypertension)      Hypercholesterolemia      Hyperlipidemia LDL goal <130 01/04/2013     Moderate persistent asthma 01/04/2013     Obesity, Class III, BMI 40-49.9 (morbid obesity) (H) 01/04/2013     LYRIC (obstructive sleep apnea)      Squamous cell carcinoma      Transaminasemia 01/04/2013     Type 2 diabetes mellitus (H) 03/2020        CC No referring provider defined for this encounter. on close of this encounter.      Again, thank you for allowing me to participate in the care of your patient.        Sincerely,        Briana Valdivia MD

## 2024-10-15 NOTE — PATIENT INSTRUCTIONS
For right temple, these are low-risk precancers called actinic keratoses. We will treat them with an anti-cancer cream.  Please start Efudex and calcipotriene mixed equally together. Apply twice daily to above areas for 4-10 days or until skin gets red. Stop applying when skin gets red.  Skin will get red and crusty (like hamburger).  Please keep Efudex away from pets and children. Wash hands thoroughly after application.   See handout below for more information about Efudex.    If medications are more than ~$75, please contact me for a compounded version through [facility].  Alternatively can also use Efudex alone. If you want to use Efudex alone, apply twice daily for 3-4 weeks. Stop when significant irritation occurs.        Efudex Treatment  Today, you are being prescribed Fluorouracil (Efudex) a topical cream used for the treatment of Actinic Keratosis (AKs).  The medication is working to eliminate the unhealthy cells.  This treatment may be unattractive and somewhat uncomfortable.  You may experience some mild discomfort while being treated.  You will want to stop any other creams such as glycolic acid products, retin A, Tazorac, etc. to the area. You may use bland makeup/cover-up as long as it doesn't sting or cause you discomfort.  Apply the cream at night as your physician recommends. Use a cotton-tipped applicator, or use gloves if applying it with your fingertips. If applied with unprotected fingertips, it is important to wash your hands well after you apply this medicine.   Keep this medication away from pets.  We recommend avoiding excessive sun exposure to the treated area  You may use moisturizing creams over bothersome areas such as Vanicream or Cetaphil cream if the reaction becomes too bothersome. Please, call the clinic if this occurs.   Potential Side Effects  Your treated areas may be unsightly during therapy.  This will improve slowly following the discontinuation of therapy.   During the  first week of application, mild inflammation may occur.   During the following weeks, redness, and swelling may occur with some crusting and burning.   Lesions resolve as the skin exfoliates.   Over 1 to 2 weeks, new skin grows into the treatment area.  Keep this medication away from pets  Specific side effects that usually do not require medical attention (report to your doctor or health care professional if they continue or are bothersome) include:  Red or dark-colored skin   Mild erosion (loss of upper layer of skin)   Mild eye irritation including burning, itching, sensitivity, stinging, or watering   Increased sensitivity of the skin to sun and ultraviolet light   Pain and burning of the affected area   Dryness, scaling or swelling of the affected area   Skin rash, itching of the affected area   Tenderness   If you have severe pain, please, call the clinic immediately and indicate that you have pain.  Ask for a call from the RN.     Who should I call with questions?  [facility]: [phone]  [facility]: [phone]  For urgent needs outside of business hours call the [facility] at [phone] and ask for the dermatology resident on call               Checking for Skin Cancer  You can help find cancer early by checking your skin each month. There are 3 main kinds of skin cancer: melanoma, basal cell carcinoma, and squamous cell carcinoma. Doing monthly skin checks is the best way to find new marks, sores, or skin changes. Follow these instructions for checking your skin.   The ABCDEs of checking moles for melanoma   Check your moles or growths for signs of melanoma using ABCDE:   Asymmetry: The sides of the mole or growth don t match.  Border: The edges are ragged, notched, or blurred.  Color: The color within the mole or growth varies. It could be black, brown, tan, white, or shades of red, gray, or blue.  Diameter: The mole or growth is  larger than   inch or 6 mm (size of a pencil eraser).  Evolving: The size, shape, texture, or color of the mole or growth is changing.     ABCDE's of moles on light skin.        ABCDE's of moles on dark skin may be harder to identify.     Checking for other types of skin cancer  Basal cell carcinoma or squamous cell carcinoma cause symptoms like:     A spot or mole that looks different from all other marks on your skin  Changes in how an area feels, such as itching, tenderness, or pain  Changes in the skin's surface, such as oozing, bleeding, or scaliness  A sore that doesn't heal  New swelling, redness, or spread of color beyond the border of a mole    Who s at risk?  Anyone of any skin color can get skin cancer. But you're at greater risk if you have:   Fair skin that freckles easily and burns instead of tanning  Light-colored or red hair  Light-colored eyes  Many moles or abnormal moles on your skin  A long history of unprotected exposure to sunlight or tanning beds  A history of many blistering sunburns as a child or teen  A family history of skin cancer  Been exposed to radiation or chemicals  A weakened immune system  Been exposed to arsenic  If you've had skin cancer in the past, you're at high risk of having it again.   How to check your skin  Do your monthly skin checkups in front of a full-length mirror. Use a room with good lighting so it's easier to see. Use a hand mirror to look at hard-to-see places like your buttocks and back. You can also have a trusted friend or family member help you with these checks. Check every part of your body, including your:   Head (ears, face, neck, and scalp)  Torso (front, back, sides, and under breasts)  Arms (tops, undersides, and armpits)  Hands (palms, backs, and fingers, including under the nails)  Lower back, buttocks, and genitals  Legs (front, back, and sides)  Feet (tops, soles, toes, including under the nails, and between toes)  Watch for new spots on your skin  or a spot that's changing in color, shape, size.   If you have a lot of moles, take digital photos of them each month. Make sure to take photos both up close and from a distance. These can help you see if any moles change over time.   Know your skin  Most skin changes aren't cancer. But if you see any changes in your skin, call your healthcare provider right away. Only they can tell you if a change is a problem. If you have skin cancer, seeing your provider can be the first step to getting the treatment that could save your life.   [employer] last reviewed this educational content on 10/1/2021    2473-1224 [employer]. All rights reserved. This information is not intended as a substitute for professional medical care. Always follow your healthcare professional's instructions.     Cryotherapy    What is it?  Use of a very cold liquid, such as liquid nitrogen, to freeze and destroy abnormal skin cells that need to be removed    What should I expect?  Tenderness and redness  A small blister that might grow and fill with dark purple blood. There may be crusting.  More than one treatment may be needed if the lesions do not go away.    How do I care for the treated area?  Gently wash the area with your hands when bathing.  Use a thin layer of Vaseline to help with healing. You may use a Band-Aid.   The area should heal within 7-10 days and may leave behind a pink or lighter color.   Do not use an antibiotic or Neosporin ointment.   You may take acetaminophen (Tylenol) for pain.     Call your doctor if you have:  Severe pain  Signs of infection (warmth, redness, cloudy yellow drainage, and or a bad smell)  Questions or concerns    Who should I call with questions?      [facility]: [phone]      [facility]: [phone]      For urgent needs outside of business hours call the [facility] at [phone] and ask for the dermatology resident  on call

## 2024-10-22 ENCOUNTER — TELEPHONE (OUTPATIENT)
Dept: GASTROENTEROLOGY | Facility: CLINIC | Age: 69
End: 2024-10-22
Payer: COMMERCIAL

## 2024-10-22 NOTE — TELEPHONE ENCOUNTER
"Endoscopy Scheduling Screen    Have you had any respiratory illness or flu-like symptoms in the last 10 days?  No    What is your communication preference for Instructions and/or Bowel Prep?   MyChart    What insurance is in the chart?  Other:  [facility]    Ordering/Referring Provider:   KINGA JOEL        (If ordering provider performs procedure, schedule with ordering provider unless otherwise instructed. )    BMI: Estimated body mass index is 40.61 kg/m  as calculated from the following:    Height as of 10/3/24: 1.753 m (5' 9\").    Weight as of 10/3/24: 124.7 kg (275 lb).     Sedation Ordered  moderate sedation.   If patient BMI > 50 do not schedule in [facility].    If patient BMI > 45 do not schedule at [facility].    Are you taking methadone or Suboxone?  NO, No RN review required.    Have you been diagnosed and are being treated for severe PTSD or severe anxiety?  NO, No RN review required.    Are you taking any prescription medications for pain 3 or more times per week?   NO, No RN review required.    Do you have a history of malignant hyperthermia?  No    (Females) Are you currently pregnant?   No     Have you been diagnosed or told you have pulmonary hypertension?   No    Do you have an LVAD?  No    Have you been told you have moderate to severe sleep apnea?  Yes. Do you use a CPAP? Yes. (RN Review required for scheduling unless scheduling in Hospital.)     Have you been told you have COPD, asthma, or any other lung disease?  Yes     What breathing problems do you have?  Asthma     Do you use home oxygen?  No    Have your breathing problems required an ED visit or hospitalization in the last year?  No.    Do you have any heart conditions?  No     Have you ever had or are you waiting for an organ transplant?  No. Continue scheduling, no site restrictions.    Have you had a stroke or transient ischemic attack (TIA aka \"mini stroke\" in the last 6 months?   No    Have you been diagnosed with or been told you have " "cirrhosis of the liver?   No.    Are you currently on dialysis?   No    Do you need assistance transferring?   No    BMI: Estimated body mass index is 40.61 kg/m  as calculated from the following:    Height as of 10/3/24: 1.753 m (5' 9\").    Weight as of 10/3/24: 124.7 kg (275 lb).     Is patients BMI > 40 and scheduling location [facility]?  No    Do you take an injectable or oral medication for weight loss or diabetes (excluding insulin)?  Yes, hold time can be up to 7 days. Please consult with you prescribing provider to discuss endoscopy recommendations. (Please schedule at least 7 days out.) - METFORMIN    Do you take the medication Naltrexone?  No    Do you take blood thinners?  No       Prep   Are you currently on dialysis or do you have chronic kidney disease?  No    Do you have a diagnosis of diabetes?  Yes (Golytely Prep)    Do you have a diagnosis of cystic fibrosis (CF)?  No    On a regular basis do you go 3 -5 days between bowel movements?  Yes (Extended Prep)    BMI > 40?  Yes (Extended Prep)    Preferred Pharmacy:    [id number] - [place] - [address] [place] [address]  Phone: [phone] Fax: [phone]    Final Scheduling Details     Procedure scheduled  Colonoscopy    Surgeon:  WILMA     Date of procedure:  12/18/2024     Pre-OP / PAC:   No - Not required for this site.    Location  [facility]    Sedation   Moderate Sedation - Per order.      Patient Reminders:   You will receive a call from a Nurse to review instructions and health history.  This assessment must be completed prior to your procedure.  Failure to complete the Nurse assessment may result in the procedure being cancelled.      On the day of your procedure, please designate an adult(s) who can drive you home stay with you for the next 24 hours. The medicines used in the exam will make you sleepy. You will not be able to drive.      You cannot take public transportation, ride share services, or non-medical " taxi service without a responsible caregiver.  Medical transport services are allowed with the requirement that a responsible caregiver will receive you at your destination.  We require that drivers and caregivers are confirmed prior to your procedure.

## 2024-12-18 ENCOUNTER — HOSPITAL ENCOUNTER (OUTPATIENT)
Facility: CLINIC | Age: 69
Discharge: HOME | End: 2024-12-18
Attending: INTERNAL MEDICINE | Admitting: INTERNAL MEDICINE
Payer: COMMERCIAL

## 2024-12-18 VITALS
HEART RATE: 74 BPM | SYSTOLIC BLOOD PRESSURE: 155 MMHG | OXYGEN SATURATION: 89 % | DIASTOLIC BLOOD PRESSURE: 80 MMHG | RESPIRATION RATE: 18 BRPM

## 2024-12-18 LAB — COLONOSCOPY: NORMAL

## 2024-12-18 PROCEDURE — 88305 TISSUE EXAM BY PATHOLOGIST: CPT | Mod: TC | Performed by: INTERNAL MEDICINE

## 2024-12-18 PROCEDURE — G0500 MOD SEDAT ENDO SERVICE >5YRS: HCPCS | Mod: PT | Performed by: INTERNAL MEDICINE

## 2024-12-18 PROCEDURE — 250N000011 HC RX IP 250 OP 636: Performed by: INTERNAL MEDICINE

## 2024-12-18 PROCEDURE — 45380 COLONOSCOPY AND BIOPSY: CPT | Mod: PT,XU | Performed by: INTERNAL MEDICINE

## 2024-12-18 PROCEDURE — 45385 COLONOSCOPY W/LESION REMOVAL: CPT | Performed by: INTERNAL MEDICINE

## 2024-12-18 PROCEDURE — 99153 MOD SED SAME PHYS/QHP EA: CPT | Performed by: INTERNAL MEDICINE

## 2024-12-18 RX ORDER — PROCHLORPERAZINE MALEATE 5 MG/1
5 TABLET ORAL EVERY 6 HOURS PRN
Status: CANCELLED | OUTPATIENT
Start: 2024-12-18

## 2024-12-18 RX ORDER — NALOXONE HYDROCHLORIDE 0.4 MG/ML
0.2 INJECTION, SOLUTION INTRAMUSCULAR; INTRAVENOUS; SUBCUTANEOUS
Status: CANCELLED | OUTPATIENT
Start: 2024-12-18

## 2024-12-18 RX ORDER — NALOXONE HYDROCHLORIDE 0.4 MG/ML
0.4 INJECTION, SOLUTION INTRAMUSCULAR; INTRAVENOUS; SUBCUTANEOUS
Status: CANCELLED | OUTPATIENT
Start: 2024-12-18

## 2024-12-18 RX ORDER — DIPHENHYDRAMINE HYDROCHLORIDE 50 MG/ML
25-50 INJECTION INTRAMUSCULAR; INTRAVENOUS
Status: CANCELLED | OUTPATIENT
Start: 2024-12-18

## 2024-12-18 RX ORDER — FENTANYL CITRATE 50 UG/ML
50-100 INJECTION, SOLUTION INTRAMUSCULAR; INTRAVENOUS EVERY 5 MIN PRN
Status: CANCELLED | OUTPATIENT
Start: 2024-12-18

## 2024-12-18 RX ORDER — ONDANSETRON 2 MG/ML
4 INJECTION INTRAMUSCULAR; INTRAVENOUS EVERY 6 HOURS PRN
Status: CANCELLED | OUTPATIENT
Start: 2024-12-18

## 2024-12-18 RX ORDER — EPINEPHRINE 1 MG/ML
0.1 INJECTION, SOLUTION INTRAMUSCULAR; SUBCUTANEOUS
Status: CANCELLED | OUTPATIENT
Start: 2024-12-18

## 2024-12-18 RX ORDER — FLUMAZENIL 0.1 MG/ML
0.2 INJECTION, SOLUTION INTRAVENOUS
Status: CANCELLED | OUTPATIENT
Start: 2024-12-18

## 2024-12-18 RX ORDER — ONDANSETRON 2 MG/ML
4 INJECTION INTRAMUSCULAR; INTRAVENOUS
Status: CANCELLED | OUTPATIENT
Start: 2024-12-18

## 2024-12-18 RX ORDER — FLUMAZENIL 0.1 MG/ML
0.2 INJECTION, SOLUTION INTRAVENOUS
Status: CANCELLED | OUTPATIENT
Start: 2024-12-18 | End: 2024-12-18

## 2024-12-18 RX ORDER — SIMETHICONE 40MG/0.6ML
133 SUSPENSION, DROPS(FINAL DOSAGE FORM)(ML) ORAL
Status: CANCELLED | OUTPATIENT
Start: 2024-12-18

## 2024-12-18 RX ORDER — ATROPINE SULFATE 0.1 MG/ML
1 INJECTION INTRAVENOUS
Status: CANCELLED | OUTPATIENT
Start: 2024-12-18

## 2024-12-18 RX ORDER — ONDANSETRON 4 MG/1
4 TABLET, ORALLY DISINTEGRATING ORAL EVERY 6 HOURS PRN
Status: CANCELLED | OUTPATIENT
Start: 2024-12-18

## 2024-12-18 RX ORDER — FENTANYL CITRATE 50 UG/ML
INJECTION, SOLUTION INTRAMUSCULAR; INTRAVENOUS PRN
Status: DISCONTINUED | OUTPATIENT
Start: 2024-12-18 | End: 2024-12-18 | Stop reason: HOSPADM

## 2024-12-18 RX ORDER — LIDOCAINE 40 MG/G
CREAM TOPICAL
Status: CANCELLED | OUTPATIENT
Start: 2024-12-18

## 2024-12-18 ASSESSMENT — ACTIVITIES OF DAILY LIVING (ADL)
ADLS_ACUITY_SCORE: 41
ADLS_ACUITY_SCORE: 41

## 2024-12-18 NOTE — H&P
[facility]  Pre-Endoscopy History and Physical     Rommel Bryan MRN# [id number]   YOB: 1955 Age: 69 year old     Date of Procedure: 12/18/2024  Primary care provider: [facility]  Type of Endoscopy: colonoscopy  Reason for Procedure: screening  Type of Anesthesia Anticipated: Moderate Sedation    HPI:    Rommel is a 69 year old male who will be undergoing the above procedure.      A history and physical has been performed. The patient's medications and allergies have been reviewed. The risks and benefits of the procedure and the sedation options and risks were discussed with the patient.  All questions were answered and informed consent was obtained.      He denies a personal or family history of anesthesia complications or bleeding disorders.     No Known Allergies     Prior to Admission Medications   Prescriptions Last Dose Informant Patient Reported? Taking?   Continuous Glucose Sensor (FREESTYLE CHRISTIAN 3 PLUS SENSOR) MISC   No No   Sig: Change every 15 days.   aspirin 81 MG tablet More than a month  Yes Yes   Sig: Take 1 tablet by mouth daily   atorvastatin (LIPITOR) 40 MG tablet Past Week  No Yes   Sig: Take 1 tablet (40 mg) by mouth daily.   bisacodyl (DULCOLAX) 5 MG EC tablet 12/16/2024  No No   Sig: Two days prior to exam take two (2) tablets at 4pm. One day prior to exam take two (2) tablets at 4pm   blood glucose (NO BRAND SPECIFIED) test strip   No No   Sig: Use to test blood sugar 1 times daily or as directed.   blood glucose monitoring (NO BRAND SPECIFIED) meter device kit   No No   Sig: Use to test blood sugar 1 times daily or as directed.   fluticasone (FLOVENT DISKUS) 250 MCG/ACT inhaler 12/18/2024 Morning  Yes Yes   Sig: Inhale 1 puff into the lungs every 12 hours As needed   glipiZIDE (GLUCOTROL) 5 MG tablet 12/16/2024  No No   Sig: TAKE 1 TABLET BY MOUTH IN  THE MORNING BEFORE  BREAKFAST   ketoconazole (NIZORAL) 2 % external cream    No No   Sig: Apply topically 2 times daily. To feet until rash resolved, then 1-2 times for maintenance   lisinopril (ZESTRIL) 10 MG tablet 12/16/2024  No No   Sig: Take 1 tablet (10 mg) by mouth daily.   metFORMIN (GLUCOPHAGE) 500 MG tablet 12/16/2024  No No   Sig: Take 1 tablet (500 mg) by mouth 3 times daily (with meals).   order for DME   No No   Sig: Equipment being ordered: CPAP mask   full face mask (CPT code: a7030) and head gear (CPT code: ).   order for DME   No No   Sig: Equipment being ordered: CPAP mask only   polyethylene glycol (GOLYTELY) 236 g suspension 12/17/2024  No Yes   Sig: Two days before procedure at 5PM fill first container with water. Mix and drink an 8 oz glass every 15 minutes until HALF of the container is gone. Place the remainder in the refrigerator. One day before procedure at 5PM drink second half of bowel prep. Drink an 8 oz glass every 15 minutes until it is gone. Day of procedure 6 hours before arrival time fill the 2nd container with water. Mix and drink an 8 oz glass every 15 minutes until HALF of the container is gone. Discard the remaining solution.      Facility-Administered Medications: None       Patient Active Problem List   Diagnosis    LYRIC (obstructive sleep apnea)    Hyperlipidemia LDL goal <130    Family history of coronary artery disease    Obesity, Class III, BMI 40-49.9 (morbid obesity) (H)    Transaminasemia    Moderate persistent asthma without complication    Hyperglycemia    Type 2 diabetes mellitus without complication, without long-term current use of insulin (H)    History of squamous cell carcinoma of skin        Past Medical History:   Diagnosis Date    Asthma     Family history of coronary artery disease 01/04/2013    HTN (hypertension)     Hypercholesterolemia     Hyperlipidemia LDL goal <130 01/04/2013    Moderate persistent asthma 01/04/2013    Obesity, Class III, BMI 40-49.9 (morbid obesity) (H) 01/04/2013    LYRIC (obstructive sleep apnea)      "Squamous cell carcinoma     Transaminasemia 2013    Type 2 diabetes mellitus (H) 2020        Past Surgical History:   Procedure Laterality Date    COLONOSCOPY  3/11/14    COLONOSCOPY  3/11/2014    Procedure: COLONOSCOPY;  colonoscopy;  Surgeon: Alirio Mao MD;  Location:  GI    REPAIR NERVE PRIMARY PERIPHERAL  2013       Social History     Tobacco Use    Smoking status: Never    Smokeless tobacco: Never   Substance Use Topics    Alcohol use: Yes     Comment: maybe 1-2  glasses a year  special occasion        Family History   Problem Relation Age of Onset    Neurologic Disorder Mother         alzheimer     Diabetes Mother     Hypertension Mother     Cardiovascular Father         chf     Skin Cancer Brother     Skin Cancer Brother     Prostate Cancer Maternal Uncle     Asthma No family hx of     Cancer - colorectal No family hx of     Anesthesia Reaction No family hx of     Blood Disease No family hx of     Eye Disorder No family hx of        REVIEW OF SYSTEMS:     5 point ROS negative except as noted above in HPI, including Gen., Resp., CV, GI &  system review.      PHYSICAL EXAM:   There were no vitals taken for this visit. Estimated body mass index is 40.61 kg/m  as calculated from the following:    Height as of 10/3/24: 1.753 m (5' 9\").    Weight as of 10/3/24: 124.7 kg (275 lb).   GENERAL APPEARANCE: healthy, alert, and no distress  MENTAL STATUS: alert  AIRWAY EXAM: Mallampatti Class II (visualization of the soft palate, fauces, and uvula)  RESP: lungs clear to auscultation - no rales, rhonchi or wheezes  CV: regular rates and rhythm      DIAGNOSTICS:    Not indicated      IMPRESSION   ASA Class 3 - Severe systemic disease, but not incapacitating        PLAN:       Plan for colonoscopy. We discussed the risks, benefits and alternatives and the patient wished to proceed.    The above has been forwarded to the consulting provider.      Signed Electronically by: Cornell ROBERTS" MD Filiberto  December 18, 2024    Cornell De Los Santos MD  [facility]  Cell: [phone]  Office Phone: [phone]  Office Fax: [phone]

## 2024-12-19 LAB
PATH REPORT.COMMENTS IMP SPEC: NORMAL
PATH REPORT.COMMENTS IMP SPEC: NORMAL
PATH REPORT.FINAL DX SPEC: NORMAL
PATH REPORT.GROSS SPEC: NORMAL
PATH REPORT.MICROSCOPIC SPEC OTHER STN: NORMAL
PATH REPORT.RELEVANT HX SPEC: NORMAL
PHOTO IMAGE: NORMAL

## 2024-12-19 PROCEDURE — 88305 TISSUE EXAM BY PATHOLOGIST: CPT | Mod: 26 | Performed by: PATHOLOGY

## 2025-01-02 PROBLEM — D12.6 ADENOMA OF COLON: Status: ACTIVE | Noted: 2025-01-02

## 2025-03-17 DIAGNOSIS — E11.65 TYPE 2 DIABETES MELLITUS WITH HYPERGLYCEMIA, WITHOUT LONG-TERM CURRENT USE OF INSULIN (H): Primary | ICD-10-CM

## 2025-03-17 RX ORDER — LISINOPRIL 10 MG/1
10 TABLET ORAL DAILY
Qty: 90 TABLET | Refills: 0 | Status: SHIPPED | OUTPATIENT
Start: 2025-03-17

## 2025-03-17 RX ORDER — GLIPIZIDE 5 MG/1
TABLET ORAL
Qty: 90 TABLET | Refills: 0 | Status: SHIPPED | OUTPATIENT
Start: 2025-03-17

## 2025-04-18 NOTE — PROGRESS NOTES
[facility] Dermatology Note  Encounter Date: Apr 22, 2025  Office Visit     Dermatology Problem List:    1. Hx of SCC, right ear, s/p Mohs 2019  2. AK's  - s/p cryo   - S/p 5FU/C fall 2023  - R Forehead and vertex scalp, s/p cryo 01/30/24  - L temple 10/15/2024  3. SK, L upper back, s/p shave bx 10/1723  4. ISK   - s/p cryo   5. Tinea pedis   - current tx: ketoconazole 2% cream   ____________________________________________    Assessment & Plan:    # Lesions to monitor   - R temple, resolved.   - R nasal ala, bland flesh colored papule with resolution of prior lesion.   - R paraspinal lower back, resolved.     # AK, glabella  - Cryotherapy performed today. See procedure note below.     # ISKs, back x 8   - Cryotherapy performed today. See procedure note below.     # Tinea pedis   - resume ketoconazole 2% cream BID prn, then 1-2 times weekly for maintenance. Refilled today.     # Benign lesions - SKs, cherry angiomas, lentigenes.  - No treatment required    # Multiple benign nevi   - Monitor for ABCDEs of melanoma   - Continue sun protection - recommend SPF 30 or higher with frequent application   - Return sooner if noticing changing or symptomatic lesions     # Hx NMSC.  - No signs of recurrence  - Continue regular skin examinations  - Sun precaution was advised including the use of sun screens of SPF 30 or higher, sun protective clothing, and avoidance of tanning beds.     Procedures Performed:   Cryotherapy procedure note: After verbal consent and discussion of risks and benefits including but no limited to dyspigmentation/scar, blister, and pain, 1 AK, 8 ISKs was(were) treated with 1-2mm freeze border for 2 cycles with liquid nitrogen. Post cryotherapy instructions were provided.     Follow-up: 12 month(s) in-person for recheck, or earlier for new or changing lesions    Staff and Scribe:     Scribe Disclosure:   Neeta DOUGLAS, am serving as a scribe; to document services personally  performed by Briana Valdivia MD -based on data collection and the provider's statements to me.     Provider Disclosure:   The documentation recorded by the scribe accurately reflects the services I personally performed and the decisions made by me.    Briana Valdivia MD    Department of Dermatology  [facility]: Phone: [phone], Fax: [phone]  4/27/2025         ____________________________________________    CC: Skin Check ([facility], concerned about a few spots on back)    HPI:  Mr. Rommel Bryan is a(n) 70 year old male who presents today as a return patient for [facility].    The patient reports that he has a few spots on his back he would like checked, notes these spots are itchy.   Using OTC topicals to tx tinea pedis. Does not have ketoconazole anymore, would like a refill.     Patient is otherwise feeling well, without additional skin concerns.    Labs Reviewed:  N/A    Physical exam:  Vitals: There were no vitals taken for this visit.  GEN: This is a well developed, well-nourished male in no acute distress, in a pleasant mood.    SKIN: Total skin excluding the undergarment areas was performed. The exam included the head/face, neck, both arms, chest, back, abdomen, both legs, digits and/or nails.   - resolved lesion on R temple.   - R nasal ala, bland flesh colored papule with resolution of prior lesion.  - prior LTM on lower back, resolved.   - There is an erythematous macule with overyling adherent scale on the glabella.  - There are tan to brown waxy stuck on papules with surrounding erythema on the back x 8.   - There are dome shaped bright red papules on the trunk and extremities .   - Multiple regular brown pigmented macules and papules are identified on the trunk and extremities. .   - Scattered brown macules on sun exposed areas.  - Waxy stuck on papules and plaques on trunk and extremities.   - No  other lesions of concern on areas examined.     Medications:  Current Outpatient Medications   Medication Sig Dispense Refill    aspirin 81 MG tablet Take 1 tablet by mouth daily      atorvastatin (LIPITOR) 40 MG tablet Take 1 tablet (40 mg) by mouth daily. 90 tablet 1    bisacodyl (DULCOLAX) 5 MG EC tablet Two days prior to exam take two (2) tablets at 4pm. One day prior to exam take two (2) tablets at 4pm 4 tablet 0    blood glucose (NO BRAND SPECIFIED) test strip Use to test blood sugar 1 times daily or as directed. 100 strip 3    blood glucose monitoring (NO BRAND SPECIFIED) meter device kit Use to test blood sugar 1 times daily or as directed. 1 kit 3    Continuous Glucose Sensor (FREESTYLE CHRISTIAN 3 PLUS SENSOR) MISC Change every 15 days. 2 each 5    fluticasone (FLOVENT DISKUS) 250 MCG/ACT inhaler Inhale 1 puff into the lungs every 12 hours As needed      glipiZIDE (GLUCOTROL) 5 MG tablet TAKE ONE TABLET BY MOUTH IN THE MORNING BEFORE BREAKFAST 90 tablet 0    ketoconazole (NIZORAL) 2 % external cream Apply topically 2 times daily. To feet until rash resolved, then 1-2 times for maintenance 60 g 11    lisinopril (ZESTRIL) 10 MG tablet TAKE ONE TABLET BY MOUTH DAILY 90 tablet 0    metFORMIN (GLUCOPHAGE) 500 MG tablet Take 1 tablet (500 mg) by mouth 3 times daily (with meals). 270 tablet 1    order for DME Equipment being ordered: CPAP mask only 1 each 0    order for DME Equipment being ordered: CPAP mask   full face mask (CPT code: a7030) and head gear (CPT code: ). 1 each 0    polyethylene glycol (GOLYTELY) 236 g suspension Two days before procedure at 5PM fill first container with water. Mix and drink an 8 oz glass every 15 minutes until HALF of the container is gone. Place the remainder in the refrigerator. One day before procedure at 5PM drink second half of bowel prep. Drink an 8 oz glass every 15 minutes until it is gone. Day of procedure 6 hours before arrival time fill the 2nd container with water.  Mix and drink an 8 oz glass every 15 minutes until HALF of the container is gone. Discard the remaining solution. 8000 mL 0     No current facility-administered medications for this visit.      Past Medical History:   Patient Active Problem List   Diagnosis    LYRIC (obstructive sleep apnea)    Hyperlipidemia LDL goal <130    Family history of coronary artery disease    Obesity, Class III, BMI 40-49.9 (morbid obesity)    Transaminasemia    Moderate persistent asthma without complication    Hyperglycemia    Type 2 diabetes mellitus without complication, without long-term current use of insulin (H)    History of squamous cell carcinoma of skin    Adenoma of colon     Past Medical History:   Diagnosis Date    Asthma     Family history of coronary artery disease 01/04/2013    HTN (hypertension)     Hypercholesterolemia     Hyperlipidemia LDL goal <130 01/04/2013    Moderate persistent asthma 01/04/2013    Obesity, Class III, BMI 40-49.9 (morbid obesity) (H) 01/04/2013    LYRIC (obstructive sleep apnea)     Squamous cell carcinoma     Transaminasemia 01/04/2013    Type 2 diabetes mellitus (H) 03/2020        CC No referring provider defined for this encounter. on close of this encounter.

## 2025-04-19 ENCOUNTER — HEALTH MAINTENANCE LETTER (OUTPATIENT)
Age: 70
End: 2025-04-19

## 2025-04-22 ENCOUNTER — OFFICE VISIT (OUTPATIENT)
Dept: DERMATOLOGY | Facility: CLINIC | Age: 70
End: 2025-04-22
Attending: DERMATOLOGY
Payer: COMMERCIAL

## 2025-04-22 DIAGNOSIS — Z12.83 SKIN CANCER SCREENING: Primary | ICD-10-CM

## 2025-04-22 DIAGNOSIS — D22.9 MULTIPLE BENIGN NEVI: ICD-10-CM

## 2025-04-22 DIAGNOSIS — D18.01 CHERRY ANGIOMA: ICD-10-CM

## 2025-04-22 DIAGNOSIS — L81.4 SOLAR LENTIGO: ICD-10-CM

## 2025-04-22 DIAGNOSIS — B35.3 TINEA PEDIS OF BOTH FEET: ICD-10-CM

## 2025-04-22 DIAGNOSIS — L57.0 ACTINIC KERATOSES: ICD-10-CM

## 2025-04-22 DIAGNOSIS — L82.1 SEBORRHEIC KERATOSES: ICD-10-CM

## 2025-04-22 DIAGNOSIS — Z85.828 HISTORY OF NONMELANOMA SKIN CANCER: ICD-10-CM

## 2025-04-22 PROCEDURE — 99213 OFFICE O/P EST LOW 20 MIN: CPT | Mod: 25 | Performed by: DERMATOLOGY

## 2025-04-22 PROCEDURE — 17110 DESTRUCTION B9 LES UP TO 14: CPT | Performed by: DERMATOLOGY

## 2025-04-22 PROCEDURE — 17000 DESTRUCT PREMALG LESION: CPT | Mod: XU | Performed by: DERMATOLOGY

## 2025-04-22 RX ORDER — KETOCONAZOLE 20 MG/G
CREAM TOPICAL 2 TIMES DAILY
Qty: 60 G | Refills: 11 | Status: SHIPPED | OUTPATIENT
Start: 2025-04-22

## 2025-04-22 NOTE — PATIENT INSTRUCTIONS
Resume ketoconazole cream twice daily as needed to feet  Let me know if any spots don't go away on back that you wanted retreated            Cryotherapy    What is it?  Use of a very cold liquid, such as liquid nitrogen, to freeze and destroy abnormal skin cells that need to be removed    What should I expect?  Tenderness and redness  A small blister that might grow and fill with dark purple blood. There may be crusting.  More than one treatment may be needed if the lesions do not go away.    How do I care for the treated area?  Gently wash the area with your hands when bathing.  Use a thin layer of Vaseline to help with healing. You may use a Band-Aid.   The area should heal within 7-10 days and may leave behind a pink or lighter color.   Do not use an antibiotic or Neosporin ointment.   You may take acetaminophen (Tylenol) for pain.     Call your doctor if you have:  Severe pain  Signs of infection (warmth, redness, cloudy yellow drainage, and or a bad smell)  Questions or concerns    Who should I call with questions?      [facility]: [phone]      [facility]: [phone]      For urgent needs outside of business hours call the [facility] at [phone] and ask for the dermatology resident on call     Proper skin care from [facility]:    -Eliminate harsh soaps as they strip the natural oils from the skin, often resulting in dry itchy skin ( i.e. Dial, Zest, [place])  -Use mild soaps such as Cetaphil or Dove Sensitive Skin in the shower. You do not need to use soap on arms, legs, and trunk every time you shower unless visibly soiled.   -Avoid hot or cold showers.  -After showering, lightly dry off and apply moisturizing within 2-3 minutes. This will help trap moisture in the skin.   -Aggressive use of a moisturizer at least 1-2 times a day to the entire body (including -Vanicream, Cetaphil, Aquaphor or Cerave) and  moisturize hands after every washing.  -We recommend using moisturizers that come in a tub that needs to be scooped out, not a pump. This has more of an oil base. It will hold moisture in your skin much better than a water base moisturizer. The above recommended are non-pore clogging.      Wear a sunscreen with at least SPF 30 on your face, ears, neck and V of the chest daily. Wear sunscreen on other areas of the body if those areas are exposed to the sun throughout the day. Sunscreens can contain physical and/or chemical blockers. Physical blockers are less likely to clog pores, these include zinc oxide and titanium dioxide. Reapply every two hour and after swimming.     Sunscreen examples: https://www.ewg.org/sunscreen/    UV radiation  UVA radiation remains constant throughout the day and throughout the year. It is a longer wavelength than UVB and therefore penetrates deeper into the skin leading to immediate and delayed tanning, photoaging, and skin cancer. 70-80% of UVA and UVB radiation occurs between the hours of 10am-2pm.  UVB radiation  UVB radiation causes the most harmful effects and is more significant during the summer months. However, snow and ice can reflect UVB radiation leading to skin damage during the winter months as well. UVB radiation is responsible for tanning, burning, inflammation, delayed erythema (pinkness), pigmentation (brown spots), and skin cancer.     I recommend self monthly full body exams and yearly full body exams with a dermatology provider. If you develop a new or changing lesion please follow up for examination. Most skin cancers are pink and scaly or pink and pearly. However, we do see blue/brown/black skin cancers.  Consider the ABCDEs of melanoma when giving yourself your monthly full body exam ( don't forget the groin, buttocks, feet, toes, etc). A-asymmetry, B-borders, C-color, D-diameter, E-elevation or evolving. If you see any of these changes please follow up in clinic.  If you cannot see your back I recommend purchasing a hand held mirror to use with a larger wall mirror.       Checking for Skin Cancer  You can find cancer early by checking your skin each month. There are 3 kinds of skin cancer. They are melanoma, basal cell carcinoma, and squamous cell carcinoma. Doing monthly skin checks is the best way to find new marks or skin changes. Follow the instructions below for checking your skin.   The ABCDEs of checking moles for melanoma   Check your moles or growths for signs of melanoma using ABCDE:   Asymmetry: the sides of the mole or growth don t match  Border: the edges are ragged, notched, or blurred  Color: the color within the mole or growth varies  Diameter: the mole or growth is larger than 6 mm (size of a pencil eraser)  Evolving: the size, shape, or color of the mole or growth is changing (evolving is not shown in the images below)    Checking for other types of skin cancer  Basal cell carcinoma or squamous cell carcinoma have symptoms such as:     A spot or mole that looks different from all other marks on your skin  Changes in how an area feels, such as itching, tenderness, or pain  Changes in the skin's surface, such as oozing, bleeding, or scaliness  A sore that does not heal  New swelling or redness beyond the border of a mole    Who s at risk?  Anyone can get skin cancer. But you are at greater risk if you have:   Fair skin, light-colored hair, or light-colored eyes  Many moles or abnormal moles on your skin  A history of sunburns from sunlight or tanning beds  A family history of skin cancer  A history of exposure to radiation or chemicals  A weakened immune system  If you have had skin cancer in the past, you are at risk for recurring skin cancer.   How to check your skin  Do your monthly skin checkups in front of a full-length mirror. Check all parts of your body, including your:   Head (ears, face, neck, and scalp)  Torso (front, back, and sides)  Arms (tops,  undersides, upper, and lower armpits)  Hands (palms, backs, and fingers, including under the nails)  Buttocks and genitals  Legs (front, back, and sides)  Feet (tops, soles, toes, including under the nails, and between toes)  If you have a lot of moles, take digital photos of them each month. Make sure to take photos both up close and from a distance. These can help you see if any moles change over time.   Most skin changes are not cancer. But if you see any changes in your skin, call your doctor right away. Only he or she can diagnose a problem. If you have skin cancer, seeing your doctor can be the first step toward getting the treatment that could save your life.   [employer] last reviewed this educational content on 4/1/2019 2000-2020 [employer]. [address] [place] [address]. All rights reserved. This information is not intended as a substitute for professional medical care. Always follow your healthcare professional's instructions.       When should I call my doctor?  If you are worsening or not improving, please, contact us or seek urgent care as noted below.     Who should I call with questions (adults)?    [facility] [phone]  For urgent needs outside of business hours call the [facility] at [phone] and ask for the dermatology resident on call to be paged  If this is a medical emergency and you are unable to reach an ER, Call [phone]      If you need a prescription refill, please contact your pharmacy. Refills are approved or denied by our Physicians during normal business hours, Monday through Friday.  Per office policy, refills will not be granted if you have not been seen within the past year (or sooner depending on the condition).

## 2025-04-22 NOTE — LETTER
4/22/2025      Rommel Bryan  [address] [address]      Dear Colleague,    Thank you for referring your patient, Rommel Bryan, to the [facility]. Please see a copy of my visit note below.    [facility] Dermatology Note  Encounter Date: Apr 22, 2025  Office Visit     Dermatology Problem List:    1. Hx of SCC, right ear, s/p Mohs 2019  2. AK's  - s/p cryo   - S/p 5FU/C fall 2023  - R Forehead and vertex scalp, s/p cryo 01/30/24  - L temple 10/15/2024  3. SK, L upper back, s/p shave bx 10/1723  4. ISK   - s/p cryo   5. Tinea pedis   - current tx: ketoconazole 2% cream   ____________________________________________    Assessment & Plan:    # Lesions to monitor   - R temple, resolved.   - R nasal ala, bland flesh colored papule with resolution of prior lesion.   - R paraspinal lower back, resolved.     # AK, glabella  - Cryotherapy performed today. See procedure note below.     # ISKs, back x 8   - Cryotherapy performed today. See procedure note below.     # Tinea pedis   - resume ketoconazole 2% cream BID prn, then 1-2 times weekly for maintenance. Refilled today.     # Benign lesions - SKs, cherry angiomas, lentigenes.  - No treatment required    # Multiple benign nevi   - Monitor for ABCDEs of melanoma   - Continue sun protection - recommend SPF 30 or higher with frequent application   - Return sooner if noticing changing or symptomatic lesions     # Hx NMSC.  - No signs of recurrence  - Continue regular skin examinations  - Sun precaution was advised including the use of sun screens of SPF 30 or higher, sun protective clothing, and avoidance of tanning beds.     Procedures Performed:   Cryotherapy procedure note: After verbal consent and discussion of risks and benefits including but no limited to dyspigmentation/scar, blister, and pain, 1 AK, 8 ISKs was(were) treated with 1-2mm freeze border for 2 cycles with liquid nitrogen. Post cryotherapy  instructions were provided.     Follow-up: 12 month(s) in-person for recheck, or earlier for new or changing lesions    Staff and Scribe:     Scribe Disclosure:   I, Neeta Barbara, am serving as a scribe; to document services personally performed by Briana Valdivia MD -based on data collection and the provider's statements to me.     Provider Disclosure:   The documentation recorded by the scribe accurately reflects the services I personally performed and the decisions made by me.    Briana Valdivia MD    Department of Dermatology  [facility]: Phone: [phone], Fax: [phone]  4/27/2025         ____________________________________________    CC: Skin Check (FBSC, concerned about a few spots on back)    HPI:  Mr. Rommel Bryan is a(n) 70 year old male who presents today as a return patient for FBSC.    The patient reports that he has a few spots on his back he would like checked, notes these spots are itchy.   Using OTC topicals to tx tinea pedis. Does not have ketoconazole anymore, would like a refill.     Patient is otherwise feeling well, without additional skin concerns.    Labs Reviewed:  N/A    Physical exam:  Vitals: There were no vitals taken for this visit.  GEN: This is a well developed, well-nourished male in no acute distress, in a pleasant mood.    SKIN: Total skin excluding the undergarment areas was performed. The exam included the head/face, neck, both arms, chest, back, abdomen, both legs, digits and/or nails.   - resolved lesion on R temple.   - R nasal ala, bland flesh colored papule with resolution of prior lesion.  - prior LTM on lower back, resolved.   - There is an erythematous macule with overyling adherent scale on the glabella.  - There are tan to brown waxy stuck on papules with surrounding erythema on the back x 8.   - There are dome shaped bright red papules on the trunk and  extremities .   - Multiple regular brown pigmented macules and papules are identified on the trunk and extremities. .   - Scattered brown macules on sun exposed areas.  - Waxy stuck on papules and plaques on trunk and extremities.   - No other lesions of concern on areas examined.     Medications:  Current Outpatient Medications   Medication Sig Dispense Refill     aspirin 81 MG tablet Take 1 tablet by mouth daily       atorvastatin (LIPITOR) 40 MG tablet Take 1 tablet (40 mg) by mouth daily. 90 tablet 1     bisacodyl (DULCOLAX) 5 MG EC tablet Two days prior to exam take two (2) tablets at 4pm. One day prior to exam take two (2) tablets at 4pm 4 tablet 0     blood glucose (NO BRAND SPECIFIED) test strip Use to test blood sugar 1 times daily or as directed. 100 strip 3     blood glucose monitoring (NO BRAND SPECIFIED) meter device kit Use to test blood sugar 1 times daily or as directed. 1 kit 3     Continuous Glucose Sensor (FREESTYLE CHRISTIAN 3 PLUS SENSOR) MISC Change every 15 days. 2 each 5     fluticasone (FLOVENT DISKUS) 250 MCG/ACT inhaler Inhale 1 puff into the lungs every 12 hours As needed       glipiZIDE (GLUCOTROL) 5 MG tablet TAKE ONE TABLET BY MOUTH IN THE MORNING BEFORE BREAKFAST 90 tablet 0     ketoconazole (NIZORAL) 2 % external cream Apply topically 2 times daily. To feet until rash resolved, then 1-2 times for maintenance 60 g 11     lisinopril (ZESTRIL) 10 MG tablet TAKE ONE TABLET BY MOUTH DAILY 90 tablet 0     metFORMIN (GLUCOPHAGE) 500 MG tablet Take 1 tablet (500 mg) by mouth 3 times daily (with meals). 270 tablet 1     order for DME Equipment being ordered: CPAP mask only 1 each 0     order for DME Equipment being ordered: CPAP mask   full face mask (CPT code: a7030) and head gear (CPT code: ). 1 each 0     polyethylene glycol (GOLYTELY) 236 g suspension Two days before procedure at 5PM fill first container with water. Mix and drink an 8 oz glass every 15 minutes until HALF of the container  is gone. Place the remainder in the refrigerator. One day before procedure at 5PM drink second half of bowel prep. Drink an 8 oz glass every 15 minutes until it is gone. Day of procedure 6 hours before arrival time fill the 2nd container with water. Mix and drink an 8 oz glass every 15 minutes until HALF of the container is gone. Discard the remaining solution. 8000 mL 0     No current facility-administered medications for this visit.      Past Medical History:   Patient Active Problem List   Diagnosis     LYRIC (obstructive sleep apnea)     Hyperlipidemia LDL goal <130     Family history of coronary artery disease     Obesity, Class III, BMI 40-49.9 (morbid obesity)     Transaminasemia     Moderate persistent asthma without complication     Hyperglycemia     Type 2 diabetes mellitus without complication, without long-term current use of insulin (H)     History of squamous cell carcinoma of skin     Adenoma of colon     Past Medical History:   Diagnosis Date     Asthma      Family history of coronary artery disease 01/04/2013     HTN (hypertension)      Hypercholesterolemia      Hyperlipidemia LDL goal <130 01/04/2013     Moderate persistent asthma 01/04/2013     Obesity, Class III, BMI 40-49.9 (morbid obesity) (H) 01/04/2013     LYRIC (obstructive sleep apnea)      Squamous cell carcinoma      Transaminasemia 01/04/2013     Type 2 diabetes mellitus (H) 03/2020        CC No referring provider defined for this encounter. on close of this encounter.      Again, thank you for allowing me to participate in the care of your patient.        Sincerely,        Briana Valdivia MD    Electronically signed

## 2025-06-17 ENCOUNTER — ANCILLARY PROCEDURE (OUTPATIENT)
Dept: GENERAL RADIOLOGY | Facility: CLINIC | Age: 70
End: 2025-06-17
Attending: PHYSICIAN ASSISTANT
Payer: COMMERCIAL

## 2025-06-17 ENCOUNTER — OFFICE VISIT (OUTPATIENT)
Dept: FAMILY MEDICINE | Facility: CLINIC | Age: 70
End: 2025-06-17
Payer: COMMERCIAL

## 2025-06-17 ENCOUNTER — RESULTS FOLLOW-UP (OUTPATIENT)
Dept: FAMILY MEDICINE | Facility: CLINIC | Age: 70
End: 2025-06-17

## 2025-06-17 VITALS
DIASTOLIC BLOOD PRESSURE: 73 MMHG | HEIGHT: 69 IN | BODY MASS INDEX: 41.35 KG/M2 | HEART RATE: 75 BPM | WEIGHT: 279.2 LBS | OXYGEN SATURATION: 90 % | RESPIRATION RATE: 22 BRPM | TEMPERATURE: 98.9 F | SYSTOLIC BLOOD PRESSURE: 143 MMHG

## 2025-06-17 DIAGNOSIS — N30.01 ACUTE CYSTITIS WITH HEMATURIA: Primary | ICD-10-CM

## 2025-06-17 DIAGNOSIS — E11.9 TYPE 2 DIABETES MELLITUS WITHOUT COMPLICATION, WITHOUT LONG-TERM CURRENT USE OF INSULIN (H): ICD-10-CM

## 2025-06-17 DIAGNOSIS — J06.9 UPPER RESPIRATORY TRACT INFECTION, UNSPECIFIED TYPE: ICD-10-CM

## 2025-06-17 DIAGNOSIS — R82.90 CLOUDY URINE: ICD-10-CM

## 2025-06-17 DIAGNOSIS — J45.41 MODERATE PERSISTENT ASTHMA WITH (ACUTE) EXACERBATION: Primary | ICD-10-CM

## 2025-06-17 LAB
ALBUMIN UR-MCNC: NEGATIVE MG/DL
ANION GAP SERPL CALCULATED.3IONS-SCNC: 12 MMOL/L (ref 3–14)
APPEARANCE UR: CLEAR
BACTERIA #/AREA URNS HPF: ABNORMAL /HPF
BILIRUB UR QL STRIP: NEGATIVE
BUN SERPL-MCNC: 16 MG/DL (ref 7–30)
CALCIUM SERPL-MCNC: 10.1 MG/DL (ref 8.5–10.1)
CHLORIDE BLD-SCNC: 101 MMOL/L (ref 94–109)
CO2 SERPL-SCNC: 28 MMOL/L (ref 20–32)
COLOR UR AUTO: YELLOW
CREAT SERPL-MCNC: 0.7 MG/DL (ref 0.66–1.25)
D DIMER PPP FEU-MCNC: 0.44 UG/ML FEU (ref 0–0.5)
EGFRCR SERPLBLD CKD-EPI 2021: >90 ML/MIN/1.73M2
ERYTHROCYTE [DISTWIDTH] IN BLOOD BY AUTOMATED COUNT: 12.4 % (ref 10–15)
EST. AVERAGE GLUCOSE BLD GHB EST-MCNC: 235 MG/DL
GLUCOSE BLD-MCNC: 138 MG/DL (ref 70–99)
GLUCOSE UR STRIP-MCNC: NEGATIVE MG/DL
HBA1C MFR BLD: 9.8 % (ref 0–5.6)
HCT VFR BLD AUTO: 43.4 % (ref 40–53)
HGB BLD-MCNC: 13.9 G/DL (ref 13.3–17.7)
HGB UR QL STRIP: NEGATIVE
KETONES UR STRIP-MCNC: ABNORMAL MG/DL
LEUKOCYTE ESTERASE UR QL STRIP: ABNORMAL
MCH RBC QN AUTO: 27.2 PG (ref 26.5–33)
MCHC RBC AUTO-ENTMCNC: 32 G/DL (ref 31.5–36.5)
MCV RBC AUTO: 85 FL (ref 78–100)
NITRATE UR QL: NEGATIVE
PH UR STRIP: 5.5 [PH] (ref 5–7)
PLATELET # BLD AUTO: 217 10E3/UL (ref 150–450)
POTASSIUM BLD-SCNC: 4.8 MMOL/L (ref 3.4–5.3)
RBC # BLD AUTO: 5.11 10E6/UL (ref 4.4–5.9)
RBC #/AREA URNS AUTO: ABNORMAL /HPF
SODIUM SERPL-SCNC: 141 MMOL/L (ref 135–145)
SP GR UR STRIP: 1.02 (ref 1–1.03)
SQUAMOUS #/AREA URNS AUTO: ABNORMAL /LPF
UROBILINOGEN UR STRIP-ACNC: 0.2 E.U./DL
WBC # BLD AUTO: 9.2 10E3/UL (ref 4–11)
WBC #/AREA URNS AUTO: ABNORMAL /HPF

## 2025-06-17 PROCEDURE — 3046F HEMOGLOBIN A1C LEVEL >9.0%: CPT | Performed by: PHYSICIAN ASSISTANT

## 2025-06-17 PROCEDURE — 87088 URINE BACTERIA CULTURE: CPT | Performed by: PHYSICIAN ASSISTANT

## 2025-06-17 PROCEDURE — 1126F AMNT PAIN NOTED NONE PRSNT: CPT | Performed by: PHYSICIAN ASSISTANT

## 2025-06-17 PROCEDURE — 83036 HEMOGLOBIN GLYCOSYLATED A1C: CPT | Performed by: PHYSICIAN ASSISTANT

## 2025-06-17 PROCEDURE — 85027 COMPLETE CBC AUTOMATED: CPT | Performed by: PHYSICIAN ASSISTANT

## 2025-06-17 PROCEDURE — 3078F DIAST BP <80 MM HG: CPT | Performed by: PHYSICIAN ASSISTANT

## 2025-06-17 PROCEDURE — 36415 COLL VENOUS BLD VENIPUNCTURE: CPT | Performed by: PHYSICIAN ASSISTANT

## 2025-06-17 PROCEDURE — 99214 OFFICE O/P EST MOD 30 MIN: CPT | Performed by: PHYSICIAN ASSISTANT

## 2025-06-17 PROCEDURE — 87086 URINE CULTURE/COLONY COUNT: CPT | Performed by: PHYSICIAN ASSISTANT

## 2025-06-17 PROCEDURE — 85379 FIBRIN DEGRADATION QUANT: CPT | Performed by: PHYSICIAN ASSISTANT

## 2025-06-17 PROCEDURE — 93000 ELECTROCARDIOGRAM COMPLETE: CPT | Performed by: PHYSICIAN ASSISTANT

## 2025-06-17 PROCEDURE — 81001 URINALYSIS AUTO W/SCOPE: CPT | Performed by: PHYSICIAN ASSISTANT

## 2025-06-17 PROCEDURE — 3077F SYST BP >= 140 MM HG: CPT | Performed by: PHYSICIAN ASSISTANT

## 2025-06-17 PROCEDURE — 71046 X-RAY EXAM CHEST 2 VIEWS: CPT | Mod: TC | Performed by: RADIOLOGY

## 2025-06-17 PROCEDURE — 80048 BASIC METABOLIC PNL TOTAL CA: CPT | Performed by: PHYSICIAN ASSISTANT

## 2025-06-17 RX ORDER — PREDNISONE 20 MG/1
40 TABLET ORAL DAILY
Qty: 10 TABLET | Refills: 0 | Status: SHIPPED | OUTPATIENT
Start: 2025-06-17

## 2025-06-17 RX ORDER — SULFAMETHOXAZOLE AND TRIMETHOPRIM 800; 160 MG/1; MG/1
1 TABLET ORAL 2 TIMES DAILY
Qty: 14 TABLET | Refills: 0 | Status: SHIPPED | OUTPATIENT
Start: 2025-06-17 | End: 2025-06-18

## 2025-06-17 RX ORDER — ALBUTEROL SULFATE 90 UG/1
2 INHALANT RESPIRATORY (INHALATION) EVERY 6 HOURS PRN
Qty: 18 G | Refills: 4 | Status: SHIPPED | OUTPATIENT
Start: 2025-06-17

## 2025-06-17 ASSESSMENT — ASTHMA QUESTIONNAIRES
ACT_TOTALSCORE: 14
QUESTION_1 LAST FOUR WEEKS HOW MUCH OF THE TIME DID YOUR ASTHMA KEEP YOU FROM GETTING AS MUCH DONE AT WORK, SCHOOL OR AT HOME: SOME OF THE TIME
QUESTION_2 LAST FOUR WEEKS HOW OFTEN HAVE YOU HAD SHORTNESS OF BREATH: MORE THAN ONCE A DAY
QUESTION_3 LAST FOUR WEEKS HOW OFTEN DID YOUR ASTHMA SYMPTOMS (WHEEZING, COUGHING, SHORTNESS OF BREATH, CHEST TIGHTNESS OR PAIN) WAKE YOU UP AT NIGHT OR EARLIER THAN USUAL IN THE MORNING: TWO OR THREE NIGHTS A WEEK
QUESTION_4 LAST FOUR WEEKS HOW OFTEN HAVE YOU USED YOUR RESCUE INHALER OR NEBULIZER MEDICATION (SUCH AS ALBUTEROL): NOT AT ALL
QUESTION_5 LAST FOUR WEEKS HOW WOULD YOU RATE YOUR ASTHMA CONTROL: SOMEWHAT CONTROLLED

## 2025-06-17 ASSESSMENT — PAIN SCALES - GENERAL: PAINLEVEL_OUTOF10: NO PAIN (0)

## 2025-06-17 NOTE — TELEPHONE ENCOUNTER
Patient stated that he would like the medication sent to a different pharmacy. Medication and pharmacy is pended.     Carina BOONE RN  [facility] Triage Team

## 2025-06-17 NOTE — PATIENT INSTRUCTIONS
EKG, chest x-ray and labs    Continue inhalers    Start prednisone, take in AM with food    Follow-up with pulmonology    Follow-up with new pcp     Increase metformin to 1000mg twice daily

## 2025-06-17 NOTE — TELEPHONE ENCOUNTER
Called patient and relayed provider's message. Patient stated understanding and had no further questions.     Carina BOONE RN  [facility] Triage Team

## 2025-06-17 NOTE — PROGRESS NOTES
Assessment and Plan:     (J45.41) Moderate persistent asthma with (acute) exacerbation  (primary encounter diagnosis)  Comment: has had cough/uri symptoms x last 2 weeks, has has increased asthma with sob and occasional wheeze, has had short-lived right-sided chest pain which worsens with palpation, had long car trip about a month ago, had been off ICS for months prior to onset, denies hemoptysis, leg swelling, no recent admissions/surgery, no fever/chills, no history of clots   Plan: albuterol (PROAIR HFA/PROVENTIL HFA/VENTOLIN         HFA) 108 (90 Base) MCG/ACT inhaler, predniSONE         (DELTASONE) 20 MG tablet, D dimer,         quantitative, EKG 12-lead complete w/read -         Clinics, Adult Pulmonary Medicine          Referral, XR Chest 2 Views  Resume ICS  Start prednisone today  Await CXR results  Discussed reasons to be seen promptly          (J06.9) Upper respiratory tract infection, unspecified type  Comment: see above  Plan: albuterol (PROAIR HFA/PROVENTIL HFA/VENTOLIN         HFA) 108 (90 Base) MCG/ACT inhaler, predniSONE         (DELTASONE) 20 MG tablet    (E11.9) Type 2 diabetes mellitus without complication, without long-term current use of insulin (H)  Comment: poorly controlled and admittedly does not always take metformin   Plan: Med Therapy Management Referral, Hemoglobin         A1c, CBC with platelets, Basic metabolic panel         (Ca, Cl, CO2, Creat, Gluc, K, Na, BUN),         metFORMIN (GLUCOPHAGE) 1000 MG tablet        Increase metformin to 1000mg bid, discuss BG will increase with prednisone, monitor    (R82.90) Cloudy urine  Comment: onset in last few weeks, no dysuria, fever/chills, back pain  Plan: UA with Microscopic reflex to Culture - lab         collect, UA Microscopic with Reflex to Culture,        Urine Culture    AYANNA Landaverde Same Day Provider   30 minutes on the day of the encounter doing chart review, history and exam, documentation and further  "activities as noted above.      Subjective   Rommel is a 70 year old, presenting for the following health issues:  URI (Chronic, got worse within last month )    History of Present Illness       Reason for visit:  Cough, shortness of breath, congestion and fatigue    He eats 0-1 servings of fruits and vegetables daily.He consumes 0 sweetened beverage(s) daily.He exercises with enough effort to increase his heart rate 9 or less minutes per day.  He exercises with enough effort to increase his heart rate 3 or less days per week.   He is taking medications regularly.      Rommel is here for congestion/cough  Onset in the last few weeks   His cough is productive with yellow-green sputum, no blood   He has been using OTC decongestants  He feels more sob  He had stopped his flovent diskus over the winter and has resumed the last week, he is unsure if it has helped  He is overall feeling a bit better today  He denies fever/chills, leg swelling  He has noticed some right-sided chest pain which will last 1-2 minutes and when he pushes on the area   He denies history of PE/DVT, recent surgery  He drove to [place] one month ago  He has never seen pulmonology  He is a diabetic, does not check glucose regularly      He has also had cloudy urine x last few weeks  He denies dysuria, abdominal pain      Objective    BP (!) 143/73 (BP Location: Left arm, Patient Position: Sitting, Cuff Size: Adult Large)   Pulse 75   Temp 98.9  F (37.2  C) (Temporal)   Resp 22   Ht 1.753 m (5' 9\")   Wt 126.6 kg (279 lb 3.2 oz)   SpO2 (!) 90%   BMI 41.23 kg/m    Body mass index is 41.23 kg/m .    Physical Exam     EXAM:  GENERAL APPEARANCE: healthy, alert and no distress  EYES: Eyes grossly normal to inspection  HENT: ear canals and TMs normal and nose and mouth without ulcers or lesions, oropharynx is clear without exudate or erythema, no tonsillar swelling  NECK: supple no adenopathy, no asymmetry, masses  RESP: mildly diminished scattered " exp wheeze, moving air well, loose-sounding cough, speaking in full sentences   CV: regular rates and rhythm, normal S1 S2, no S3 or S4 and no murmur, click or rub  ABD: soft, NT  EXT: trace edema bilat lower ext           Signed Electronically by: Melissa Leung PA-C

## 2025-06-18 LAB — BACTERIA UR CULT: ABNORMAL

## 2025-06-18 RX ORDER — CEPHALEXIN 500 MG/1
500 CAPSULE ORAL 2 TIMES DAILY
Qty: 14 CAPSULE | Refills: 0 | Status: SHIPPED | OUTPATIENT
Start: 2025-06-18

## 2025-06-18 NOTE — RESULT ENCOUNTER NOTE
Elias Carney,     Your chest x-ray was negative for signs of pneumonia.      Please let us know if you have any questions or concerns.    If you have a question about the results, please use the ? (question nick) option at the upper right corner.     Regards,  Melissa Leung PA-C

## 2025-06-18 NOTE — TELEPHONE ENCOUNTER
Triage spoke to pt and relayed provider note - pt verbalized understanding, and pt asked to review imaging results. Triage read pt the x-ray result note also written by provider. Again pt verbalized understanding.    Closing encounter.    Grecia Grey RN

## 2025-06-30 DIAGNOSIS — E11.65 TYPE 2 DIABETES MELLITUS WITH HYPERGLYCEMIA, WITHOUT LONG-TERM CURRENT USE OF INSULIN (H): ICD-10-CM

## 2025-06-30 NOTE — TELEPHONE ENCOUNTER
"Dr. Khan,     Pt reaching out via  message (you are most recent PCP). Requesting freestyle alexandrea 3 plus CGM. Completely out of them \"for a while\" and forgetting to check BS via fingerstick. Pharmacy pended if appropriate. If signed please send back to team to work on PA information.    Alejandra Vences RN on 6/30/2025 at 1:56 PM    "

## 2025-06-30 NOTE — TELEPHONE ENCOUNTER
Pt called about Shona 3 would need a refill but needs to be refilled after pre-op. Has an upcoming appt on 7/18 with Dr. Wallace. Did askn a medical question and transferred to an RN to better assist pt.

## 2025-06-30 NOTE — TELEPHONE ENCOUNTER
I've not met this patient before.  Will need to wait until his upcoming appt.  Has he reached out to whomever previously prescribed this for him?  Enzo Wallace MD on 6/30/2025 at 1:29 PM

## 2025-06-30 NOTE — CONFIDENTIAL NOTE
RECORDS RECEIVED FROM: internal    DATE RECEIVED: 8.18.25   NOTES STATUS DETAILS   OFFICE NOTE from referring provider internal    Melissa Muro PA-C      MEDICATION LIST internal     IMAGING  (NEED IMAGES AND REPORTS)     CHEST XRAY (CXR) internal  6.17.25   TESTS     PULMONARY FUNCTION TESTING (PFT) internal  Scheduled 8.18.25

## 2025-06-30 NOTE — TELEPHONE ENCOUNTER
To Dr. Wallace,   Pt states he has been totally out of freestyle alexandrea 3 plus CGM for a while. Pt has trouble remembering to check fingerstick blood sugar. Pt hoping provider will order CGM prior to scheduled appointment on 7/18/2025. Pt told he will need a PA as well. Please advise if able to order CGM prior to appointment or other recommendations. Pharmacy pended if appropriate. If ordered, please route to team to assist in PA process.       Karina Dawkins RN

## 2025-07-08 DIAGNOSIS — E11.65 TYPE 2 DIABETES MELLITUS WITH HYPERGLYCEMIA, WITHOUT LONG-TERM CURRENT USE OF INSULIN (H): ICD-10-CM

## 2025-07-08 DIAGNOSIS — E78.5 HYPERLIPIDEMIA LDL GOAL <130: ICD-10-CM

## 2025-07-08 RX ORDER — ATORVASTATIN CALCIUM 40 MG/1
40 TABLET, FILM COATED ORAL DAILY
Qty: 90 TABLET | Refills: 0 | Status: SHIPPED | OUTPATIENT
Start: 2025-07-08

## 2025-07-08 RX ORDER — GLIPIZIDE 5 MG/1
5 TABLET ORAL
Qty: 90 TABLET | Refills: 0 | Status: SHIPPED | OUTPATIENT
Start: 2025-07-08

## 2025-07-09 RX ORDER — HYDROCHLOROTHIAZIDE 12.5 MG/1
CAPSULE ORAL
Qty: 2 EACH | Refills: 0 | Status: SHIPPED | OUTPATIENT
Start: 2025-07-09

## 2025-07-09 RX ORDER — LISINOPRIL 10 MG/1
10 TABLET ORAL DAILY
Qty: 90 TABLET | Refills: 0 | Status: SHIPPED | OUTPATIENT
Start: 2025-07-09

## 2025-07-21 ENCOUNTER — TELEPHONE (OUTPATIENT)
Dept: FAMILY MEDICINE | Facility: CLINIC | Age: 70
End: 2025-07-21
Payer: COMMERCIAL

## 2025-07-21 ENCOUNTER — OFFICE VISIT (OUTPATIENT)
Dept: PHARMACY | Facility: CLINIC | Age: 70
End: 2025-07-21
Attending: PHYSICIAN ASSISTANT
Payer: COMMERCIAL

## 2025-07-21 VITALS — DIASTOLIC BLOOD PRESSURE: 68 MMHG | WEIGHT: 279 LBS | SYSTOLIC BLOOD PRESSURE: 132 MMHG | BODY MASS INDEX: 41.2 KG/M2

## 2025-07-21 DIAGNOSIS — G47.33 OSA (OBSTRUCTIVE SLEEP APNEA): ICD-10-CM

## 2025-07-21 DIAGNOSIS — I10 BENIGN ESSENTIAL HYPERTENSION: ICD-10-CM

## 2025-07-21 DIAGNOSIS — B35.3 TINEA PEDIS OF BOTH FEET: ICD-10-CM

## 2025-07-21 DIAGNOSIS — J45.41 MODERATE PERSISTENT ASTHMA WITH (ACUTE) EXACERBATION: ICD-10-CM

## 2025-07-21 DIAGNOSIS — E78.5 HYPERLIPIDEMIA LDL GOAL <130: ICD-10-CM

## 2025-07-21 DIAGNOSIS — E11.9 TYPE 2 DIABETES MELLITUS WITHOUT COMPLICATION, WITHOUT LONG-TERM CURRENT USE OF INSULIN (H): Primary | ICD-10-CM

## 2025-07-21 PROCEDURE — 99605 MTMS BY PHARM NP 15 MIN: CPT | Performed by: PHARMACIST

## 2025-07-21 PROCEDURE — 99607 MTMS BY PHARM ADDL 15 MIN: CPT | Performed by: PHARMACIST

## 2025-07-21 PROCEDURE — 3075F SYST BP GE 130 - 139MM HG: CPT | Performed by: PHARMACIST

## 2025-07-21 PROCEDURE — 3078F DIAST BP <80 MM HG: CPT | Performed by: PHARMACIST

## 2025-07-21 NOTE — LETTER
"Recommended To-Do List      Prepared on: Jul 21, 2025       You can get the best results from your medications by completing the items on this \"To-Do List.\"      Bring your To-Do List when you go to your doctor. And, share it with your family or caregivers.    My To-Do List:  What we talked about: What I should do:   Your medication dosage being too low    Change when you are taking glipiZIDE (GLUCOTROL)          What we talked about: What I should do:   An issue with your medication    Education: Take metformin with food - this may help with diarrhea           What we talked about: What I should do:                     "

## 2025-07-21 NOTE — LETTER
July 21, 2025  Rommel Bryan  [address] [address]    Dear Mr. Bryan, [facility]     Thank you for talking with me on Jul 21, 2025 about your health and medications. As a follow-up to our conversation, I have included two documents:      Your Recommended To-Do List has steps you should take to get the best results from your medications.  Your Medication List will help you keep track of your medications and how to take them.    If you want to talk about these documents, please call Pamela Fox PharmD at phone: [phone], Monday-Friday 8-4:30pm.    I look forward to working with you and your doctors to make sure your medications work well for you.    Sincerely,  Pamela Fox PharmD  [facility] Pharmacist, [facility]

## 2025-07-21 NOTE — PATIENT INSTRUCTIONS
"Recommendations from today's MTM visit:                                                    MTM (medication therapy management) is a service provided by a clinical pharmacist designed to help you get the most of out of your medicines.   Today we reviewed what your medicines are for, how to know if they are working, that your medicines are safe and how to make your medicine regimen as easy as possible.      I'll submit a prior authorization with your insurance for the continuous glucose monitor.   With the immediate release glipizide, it works best if taken about 30 minutes prior to meals.  I would recommend taking metformin with food.     Follow-up: Return in about 1 week (around 7/28/2025) for check in via [email or web address] - I will let you know once we hear back from insurance.    It was great speaking with you today.  I value your experience and would be very thankful for your time in providing feedback in our clinic survey. In the next few days, you may receive an email or text message from [employer] with a link to a survey related to your  clinical pharmacist.\"     To schedule another MTM appointment, please call the clinic directly or you may call the MTM scheduling line at [phone] or toll-free at [phone].     My Clinical Pharmacist's contact information:                                                      Please feel free to contact me with any questions or concerns you have.      Pamela Fox, PharmD, [facility]  Medication Therapy Management Provider  [phone]    "

## 2025-07-21 NOTE — LETTER
_  Medication List        Prepared on: Jul 21, 2025     Bring your Medication List when you go to the doctor, hospital, or   emergency room. And, share it with your family or caregivers.     Note any changes to how you take your medications.  Cross out medications when you no longer use them.    Medication How I take it Why I use it Prescriber   albuterol (PROAIR HFA/PROVENTIL HFA/VENTOLIN HFA) 108 (90 Base) MCG/ACT inhaler Inhale 2 puffs into the lungs every 6 hours as needed for shortness of breath, wheezing or cough. Moderate persistent asthma with (acute) exacerbation; Upper respiratory tract infection, unspecified type Melissa Leung PA-C   aspirin 81 MG tablet Take 1 tablet by mouth daily as needed. Type 2 diabetes Patient Reported   atorvastatin (LIPITOR) 40 MG tablet TAKE ONE TABLET BY MOUTH DAILY Hyperlipidemia LDL Goal <130 Beckie Khan MD   blood glucose (NO BRAND SPECIFIED) test strip Use to test blood sugar 1 times daily or as directed. Type 2 diabetes mellitus without complication, without long-term current use of insulin (H) Beckie Khan MD   blood glucose monitoring (NO BRAND SPECIFIED) meter device kit Use to test blood sugar 1 times daily or as directed. Type 2 diabetes mellitus without complication, without long-term current use of insulin (H) Beckie Khan MD   Continuous Glucose Sensor (FREESTYLE CHRISTIAN 3 PLUS SENSOR) MISC Change every 15 days. Type 2 diabetes mellitus with hyperglycemia, without long-term current use of insulin (H) Beckie Khan MD   glipiZIDE (GLUCOTROL XL) 10 MG 24 hr tablet Take 1 tablet (10 mg) by mouth daily. Type 2 diabetes mellitus with hyperglycemia, without long-term current use of insulin (H) Enzo Wallace MD   ketoconazole (NIZORAL) 2 % external cream Apply topically 2 times daily. To feet until rash resolved, then 1-2 times for maintenance Tinea pedis of both feet Briana Valdivia MD   lisinopril (ZESTRIL) 10 MG tablet TAKE  ONE TABLET BY MOUTH DAILY Type 2 diabetes mellitus with hyperglycemia, without long-term current use of insulin (H) Beckie Khan MD   metFORMIN (GLUCOPHAGE) 1000 MG tablet Take 1 tablet (1,000 mg) by mouth 2 times daily (with meals). Type 2 diabetes mellitus without complication, without long-term current use of insulin (H) Beckie Khan MD   mometasone (ASMANEX TWISTHALER) 220 MCG/ACT inhaler Inhale 1 puff into the lungs every evening. Moderate persistent asthma with (acute) exacerbation Melissa Leung PA-C   order for DME Equipment being ordered: CPAP mask full face mask (CPT code: a7030) and head gear (CPT code: ). LYRIC (Obstructive Sleep Apnea) Kieran Tomlinson MD   order for DME Equipment being ordered: CPAP mask only LYRIC (Obstructive Sleep Apnea) Kieran Tomlinson MD         Add new medications, over-the-counter drugs, herbals, vitamins, or  minerals in the blank rows below.    Medication How I take it Why I use it Prescriber                                      Allergies:      No Known Allergies        Side effects I have had:      No Known Side Effects        Other Information:              My notes and questions:

## 2025-07-21 NOTE — PROGRESS NOTES
"Medication Therapy Management (MTM) Encounter    ASSESSMENT:                            Medication Adherence/Access: No issues identified.    Diabetes  Patient is not meeting A1c goal of <7%. Would benefit from taking metformin with food to minimize loose stools. To maximize glucose control, would also benefit from taking immediate release glipizide 30 minutes before meals. I would recommend getting a prior authorization from his insurance for the continuous glucose monitor to see how his blood sugars are throughout the day.      Hypertension  Stable. Patient is at goal BP <130/80 mmHg.     Hyperlipidemia  LDL not at goal <70 mg/dL, not discussed today but atorvastatin dose could be increased.    Asthma  May benefit from taking rescue inhaler if experiencing wheezing or shortness of breath.     Tinea Pedis  Stable.     LYRIC  Stable.     PLAN:                            I'll submit a prior authorization with your insurance for the continuous glucose monitor.   With the immediate release glipizide, it works best if taken about 30 minutes prior to meals.  I would recommend taking metformin with food.   I would recommend taking your albuterol inhaler (1-2 puffs) if you are experiencing shortness of breath or wheezing throughout the day.     Follow-up: Return in about 1 week (around 7/28/2025) for check in via [employer] - I will let you know once we hear back from insurance.    SUBJECTIVE/OBJECTIVE:                          Rommel Bryan is a 70 year old male coming in for a follow-up visit.  He has seen MTM in the past, but it's been a few years since his last visit.    Reason for visit: Diabetes management.    Allergies/ADRs: Reviewed in chart  Past Medical History: Reviewed in chart  Tobacco: He reports that he has never smoked. He has never used smokeless tobacco.  Alcohol: special occasions only (maybe a few glasses of wine per year)  Caffeine: Occasional soda (once a month at the most)  Activity: \"Not very " "much\"    Medication Adherence/Access: no issues reported.    Diabetes   Glipizide 5mg twice daily - just increased by primary care provider last week, after finishing supply will change to glipizide XL  Metformin 1000mg twice daily, dose recently has been doubled  Aspirin 81mg daily - only takes as needed for headaches, doesn't wish to take daily  Patient is experiencing some episodes of watery diarrhea since increasing metformin dose.  Current diabetes symptoms: none  Diet/Exercise: Doesn't follow any specific diet - tries to eat a vegetable with dinner.  During the day tends to eat hot dog or sandwich.  Used to work with a diabetes health  which he found very helpful.     Blood sugar monitoring: never, previously used continuous glucose monitor but new insurance wouldn't cover without pre-approval.  Eye exam is up to date  Foot exam: due    Hypertension   Lisinopril 10 mg daily tablet  Patient reports no current medication side effects  Patient does not self-monitor blood pressure, plans to start but needs to find home blood pressure cuff      Hyperlipidemia   Atorvastatin 40mg daily  Patient reports no significant myalgias or other side effects.      Asthma   Albuterol MDI 2 puffs four times daily as needed - generally doesn't need  Asmanex twisthaler - changed from Flovent by insurance preference a few weeks ago   Patient reports no current medication side effects.      Triggers include: animal dander and poor air quality.  Patient reports the following symptoms: occasional wheezing     Tinea Pedis   Ketoconazole 2% cream twice daily  No issues reported.    LYRIC  Uses CPAP regularly  Notes he likely needs a new machine, has become increasingly difficult to get supplies.    Today's Vitals: /68   Wt 279 lb (126.6 kg)   BMI 41.20 kg/m    ----------------    I spent 45 minutes with this patient today. A copy of the visit note was provided to the patient's provider(s).    A summary of these " recommendations was given to the patient.    Jon Edwards, PharmD IV Student    Pamela Fox, PharmD, [facility]  Medication Therapy Management Provider  [phone]      Medication Therapy Recommendations  Type 2 diabetes mellitus without complication, without long-term current use of insulin (H)   1 Current Medication: glipiZIDE (GLUCOTROL) 5 MG tablet (Discontinued)   Current Medication Sig: TAKE ONE TABLET BY MOUTH IN THE MORNING BEFORE BREAKFAST   Rationale: Incorrect administration - Dosage too low - Effectiveness   Recommendation: Change Administration Time   Status: Patient Agreed - Adherence/Education   Identified Date: 7/21/2025 Completed Date: 7/21/2025         2 Current Medication: metFORMIN (GLUCOPHAGE) 1000 MG tablet   Current Medication Sig: Take 1 tablet (1,000 mg) by mouth 2 times daily (with meals).   Rationale: Undesirable effect - Adverse medication event - Safety   Recommendation: Provide Education - metFORMIN 1000 MG tablet   Status: Accepted per CPA   Identified Date: 7/21/2025 Completed Date: 7/21/2025

## 2025-08-18 ENCOUNTER — PRE VISIT (OUTPATIENT)
Dept: PULMONOLOGY | Facility: CLINIC | Age: 70
End: 2025-08-18

## 2025-08-18 ENCOUNTER — OFFICE VISIT (OUTPATIENT)
Dept: PULMONOLOGY | Facility: CLINIC | Age: 70
End: 2025-08-18
Attending: PHYSICIAN ASSISTANT
Payer: COMMERCIAL

## 2025-08-18 VITALS
BODY MASS INDEX: 40.57 KG/M2 | HEART RATE: 76 BPM | WEIGHT: 273.9 LBS | SYSTOLIC BLOOD PRESSURE: 106 MMHG | HEIGHT: 69 IN | OXYGEN SATURATION: 94 % | DIASTOLIC BLOOD PRESSURE: 69 MMHG

## 2025-08-18 DIAGNOSIS — J45.41 MODERATE PERSISTENT ASTHMA WITH (ACUTE) EXACERBATION: ICD-10-CM

## 2025-08-18 DIAGNOSIS — J30.89 SEASONAL ALLERGIC RHINITIS DUE TO OTHER ALLERGIC TRIGGER: Primary | ICD-10-CM

## 2025-08-18 LAB
DLCOUNC-%PRED-PRE: 84 %
DLCOUNC-PRE: 21.15 ML/MIN/MMHG
DLCOUNC-PRED: 25.06 ML/MIN/MMHG
ERV-%PRED-PRE: 13 %
ERV-PRE: 0.16 L
ERV-PRED: 1.19 L
EXPTIME-PRE: 14.31 SEC
FEF2575-%PRED-POST: 25 %
FEF2575-%PRED-PRE: 11 %
FEF2575-POST: 0.58 L/SEC
FEF2575-PRE: 0.26 L/SEC
FEF2575-PRED: 2.24 L/SEC
FEFMAX-%PRED-PRE: 48 %
FEFMAX-PRE: 3.9 L/SEC
FEFMAX-PRED: 8.08 L/SEC
FEV1-%PRED-PRE: 40 %
FEV1-PRE: 1.18 L
FEV1FEV6-PRE: 60 %
FEV1FEV6-PRED: 78 %
FEV1FVC-PRE: 50 %
FEV1FVC-PRED: 77 %
FEV1SVC-PRE: 48 L
FEV1SVC-PRED: 67 L
FIFMAX-PRE: 3.82 L/SEC
FRCPLETH-PRED: 3.71 L
FVC-%PRED-PRE: 60 %
FVC-PRE: 2.36 L
FVC-PRED: 3.88 L
GAW-PRED: 1.03 L/S/CMH2O
IC-%PRED-PRE: 72 %
IC-PRE: 2.31 L
IC-PRED: 3.18 L
Lab: 64 %
PULMONARY FUNCTION TEST-FENO: 28 PPB (ref 0–40)
RVPLETH-PRED: 2.46 L
SGAW-PRED: 0.2 1/CMH2O*S
SRAW-PRED: < 4.76 CMH2O*S
TLCPLETH-PRED: 6.91 L
VA-%PRED-PRE: 57 %
VA-PRE: 3.58 L
VC-%PRED-PRE: 55 %
VC-PRE: 2.46 L
VC-PRED: 4.42 L

## 2025-08-18 PROCEDURE — 94060 EVALUATION OF WHEEZING: CPT | Performed by: INTERNAL MEDICINE

## 2025-08-18 PROCEDURE — 95012 NITRIC OXIDE EXP GAS DETER: CPT | Performed by: INTERNAL MEDICINE

## 2025-08-18 PROCEDURE — 94729 DIFFUSING CAPACITY: CPT | Performed by: INTERNAL MEDICINE

## 2025-08-18 PROCEDURE — G2211 COMPLEX E/M VISIT ADD ON: HCPCS | Performed by: INTERNAL MEDICINE

## 2025-08-18 PROCEDURE — 1126F AMNT PAIN NOTED NONE PRSNT: CPT | Performed by: INTERNAL MEDICINE

## 2025-08-18 PROCEDURE — 99215 OFFICE O/P EST HI 40 MIN: CPT | Performed by: INTERNAL MEDICINE

## 2025-08-18 PROCEDURE — 3078F DIAST BP <80 MM HG: CPT | Performed by: INTERNAL MEDICINE

## 2025-08-18 PROCEDURE — G0463 HOSPITAL OUTPT CLINIC VISIT: HCPCS | Performed by: INTERNAL MEDICINE

## 2025-08-18 PROCEDURE — 3074F SYST BP LT 130 MM HG: CPT | Performed by: INTERNAL MEDICINE

## 2025-08-18 RX ORDER — FLUTICASONE PROPIONATE AND SALMETEROL 250; 50 UG/1; UG/1
1 POWDER RESPIRATORY (INHALATION) EVERY 12 HOURS
Qty: 3 EACH | Refills: 3 | Status: SHIPPED | OUTPATIENT
Start: 2025-08-18

## 2025-08-18 RX ORDER — FLUTICASONE PROPIONATE AND SALMETEROL 250; 50 UG/1; UG/1
1 POWDER RESPIRATORY (INHALATION) EVERY 12 HOURS
Qty: 3 EACH | Refills: 3 | Status: SHIPPED | OUTPATIENT
Start: 2025-08-18 | End: 2025-08-18

## 2025-08-18 ASSESSMENT — ASTHMA QUESTIONNAIRES
ACT_TOTALSCORE: 23
QUESTION_1 LAST FOUR WEEKS HOW MUCH OF THE TIME DID YOUR ASTHMA KEEP YOU FROM GETTING AS MUCH DONE AT WORK, SCHOOL OR AT HOME: NONE OF THE TIME
QUESTION_3 LAST FOUR WEEKS HOW OFTEN DID YOUR ASTHMA SYMPTOMS (WHEEZING, COUGHING, SHORTNESS OF BREATH, CHEST TIGHTNESS OR PAIN) WAKE YOU UP AT NIGHT OR EARLIER THAN USUAL IN THE MORNING: NOT AT ALL
QUESTION_2 LAST FOUR WEEKS HOW OFTEN HAVE YOU HAD SHORTNESS OF BREATH: NOT AT ALL
QUESTION_4 LAST FOUR WEEKS HOW OFTEN HAVE YOU USED YOUR RESCUE INHALER OR NEBULIZER MEDICATION (SUCH AS ALBUTEROL): ONCE A WEEK OR LESS
ACT_TOTALSCORE: 23
QUESTION_5 LAST FOUR WEEKS HOW WOULD YOU RATE YOUR ASTHMA CONTROL: WELL CONTROLLED

## 2025-08-18 ASSESSMENT — PAIN SCALES - GENERAL: PAINLEVEL_OUTOF10: NO PAIN (0)

## 2025-08-20 DIAGNOSIS — E11.65 TYPE 2 DIABETES MELLITUS WITH HYPERGLYCEMIA, WITHOUT LONG-TERM CURRENT USE OF INSULIN (H): ICD-10-CM

## 2025-08-20 RX ORDER — HYDROCHLOROTHIAZIDE 12.5 MG/1
CAPSULE ORAL
Qty: 2 EACH | Refills: 2 | Status: SHIPPED | OUTPATIENT
Start: 2025-08-20

## 2025-08-21 ENCOUNTER — MYC REFILL (OUTPATIENT)
Dept: FAMILY MEDICINE | Facility: CLINIC | Age: 70
End: 2025-08-21
Payer: COMMERCIAL

## 2025-08-21 DIAGNOSIS — E11.65 TYPE 2 DIABETES MELLITUS WITH HYPERGLYCEMIA, WITHOUT LONG-TERM CURRENT USE OF INSULIN (H): ICD-10-CM

## 2025-08-21 RX ORDER — HYDROCHLOROTHIAZIDE 12.5 MG/1
CAPSULE ORAL
Qty: 2 EACH | Refills: 2 | OUTPATIENT
Start: 2025-08-21

## 2025-08-25 ENCOUNTER — TELEPHONE (OUTPATIENT)
Dept: PULMONOLOGY | Facility: CLINIC | Age: 70
End: 2025-08-25
Payer: COMMERCIAL

## 2025-09-03 ENCOUNTER — PATIENT OUTREACH (OUTPATIENT)
Dept: CARE COORDINATION | Facility: CLINIC | Age: 70
End: 2025-09-03
Payer: COMMERCIAL

## (undated) RX ORDER — FENTANYL CITRATE 50 UG/ML
INJECTION, SOLUTION INTRAMUSCULAR; INTRAVENOUS
Status: DISPENSED
Start: 2024-12-18